# Patient Record
Sex: FEMALE | Race: WHITE | NOT HISPANIC OR LATINO | Employment: PART TIME | URBAN - METROPOLITAN AREA
[De-identification: names, ages, dates, MRNs, and addresses within clinical notes are randomized per-mention and may not be internally consistent; named-entity substitution may affect disease eponyms.]

---

## 2017-01-12 PROBLEM — E55.9 VITAMIN D DEFICIENCY: Status: ACTIVE | Noted: 2017-01-12

## 2017-03-29 ENCOUNTER — TRANSFERRED RECORDS (OUTPATIENT)
Dept: HEALTH INFORMATION MANAGEMENT | Facility: CLINIC | Age: 22
End: 2017-03-29

## 2017-04-04 ENCOUNTER — OFFICE VISIT (OUTPATIENT)
Dept: FAMILY MEDICINE | Facility: CLINIC | Age: 22
End: 2017-04-04
Payer: COMMERCIAL

## 2017-04-04 VITALS
BODY MASS INDEX: 28.53 KG/M2 | WEIGHT: 156 LBS | DIASTOLIC BLOOD PRESSURE: 74 MMHG | SYSTOLIC BLOOD PRESSURE: 105 MMHG | HEART RATE: 85 BPM | TEMPERATURE: 98.3 F

## 2017-04-04 DIAGNOSIS — Q21.3 TETRALOGY OF FALLOT: ICD-10-CM

## 2017-04-04 DIAGNOSIS — Q93.81 VELOCARDIOFACIAL SYNDROME: ICD-10-CM

## 2017-04-04 DIAGNOSIS — F25.1 SCHIZOAFFECTIVE DISORDER, DEPRESSIVE TYPE (H): Primary | ICD-10-CM

## 2017-04-04 DIAGNOSIS — Z72.89 DELIBERATE SELF-CUTTING: ICD-10-CM

## 2017-04-04 PROCEDURE — 99214 OFFICE O/P EST MOD 30 MIN: CPT | Performed by: FAMILY MEDICINE

## 2017-04-04 RX ORDER — RISPERIDONE 4 MG/1
4 TABLET ORAL 2 TIMES DAILY
COMMUNITY
End: 2017-04-04

## 2017-04-04 RX ORDER — RISPERIDONE 4 MG/1
4 TABLET ORAL AT BEDTIME
COMMUNITY
Start: 2017-04-04 | End: 2018-08-09

## 2017-04-04 RX ORDER — RISPERIDONE 1 MG/1
1 TABLET ORAL 2 TIMES DAILY
COMMUNITY
End: 2017-04-04

## 2017-04-04 RX ORDER — RISPERIDONE 0.5 MG/1
0.5 TABLET ORAL 2 TIMES DAILY
Qty: 60 TABLET | COMMUNITY
Start: 2017-04-04 | End: 2018-08-09

## 2017-04-04 RX ORDER — RISPERIDONE 0.5 MG/1
0.5 TABLET ORAL 2 TIMES DAILY
COMMUNITY
End: 2017-04-04

## 2017-04-04 RX ORDER — RISPERIDONE 1 MG/1
1 TABLET ORAL EVERY MORNING
Qty: 60 TABLET | COMMUNITY
Start: 2017-04-04 | End: 2017-07-20

## 2017-04-04 NOTE — PROGRESS NOTES
SUBJECTIVE:     CC: Camilla Henao is an 21 year old woman who presents for preventive health visit.     Healthy Habits:    Do you get at least three servings of calcium containing foods daily (dairy, green leafy vegetables, etc.)? yes    Amount of exercise or daily activities, outside of work: 3 day(s) per week    Problems taking medications regularly No    Medication side effects: No    Have you had an eye exam in the past two years? yes    Do you see a dentist twice per year? yes  Do you have sleep apnea, excessive snoring or daytime drowsiness?no        Today's PHQ-2 Score:   PHQ-2 ( 1999 [employer]) 6/4/2013 3/1/2013   Q1: Little interest or pleasure in doing things 1 0   Q2: Feeling down, depressed or hopeless 2 1   PHQ-2 Score 3 1       Abuse: Current or Past(Physical, Sexual or Emotional)-   Do you feel safe in your environment -     Social History   Substance Use Topics     Smoking status: Never Smoker     Smokeless tobacco: Never Used     Alcohol use No         Recent Labs   Lab Test  12/21/16   0833  06/17/16   1710   CHOL  158  162   HDL  49*  46*   LDL  81  97   TRIG  140  98   NHDL  109  116       Reviewed orders with patient.  Reviewed health maintenance and updated orders accordingly - No    Mammo Decision Support:  Mammogram not appropriate for this patient based on age.    Pertinent mammograms are reviewed under the imaging tab.  History of abnormal Pap smear: Pap not appropriate based on sexual history and mental health    Reviewed and updated as needed this visit by clinical staff         Reviewed and updated as needed this visit by Provider        Camilla Henao is a 21 year old female here with her mother. Camilla had a significant medical history of Tetralogy of Fallot, is s/p surgery for this, still sees a cardiologist annually.  She was also diagnosed with velocardiofacial syndrome, and had a palate repair and speech therapy in childhood.  She has had learning disabilities throughout school,  "and in adolescence also developed mental health issues, and has had various diagnoses including major depression, schizoaffective disorder, and possible schizophrenia.  She has had a history of auditory hallucination, also a history of self-cutting. She is followed by Dr. Mame Odell, psychiatrist.  It is felt that at least some of her neuropsychiatric issues are likely secondary to her velocardiofacial syndrome, as \"\"Children with VCFS are also at increased risk for mood and behavior disorders, which tend to emerge later in childhood and adolescence\".    She was seen 6/14/2016 for an admission physical prior to entering [facility], a 3 month residential treatment center.  She is now scheduled to return to [facility] for another three months, and needs an updated exam.  Mother has brought the appropriate forms which will be completed and scanned into EPIC; parent will bring originals to the facility.   Additional past history details are available in the note of 6/14/16.    ROS:  Camilla denies recent sinus trouble, GI or  concerns.  To her mother's knowledge, the patient has not cut herself for at least two weeks.    Current Outpatient Prescriptions   Medication Sig     MIRTAZAPINE PO Take 30 mg by mouth At Bedtime     Cholecalciferol (VITAMIN D-3 PO) Take 5,000 Units by mouth     risperiDONE (RISPERDAL) 4 MG tablet Take 1 tablet (4 mg) by mouth At Bedtime     risperiDONE (RISPERDAL) 1 MG tablet Take 1 tablet (1 mg) by mouth every morning     risperiDONE (RISPERDAL) 0.5 MG tablet Take 1 tablet (0.5 mg) by mouth 2 times daily Prn auditory hallucinations     No current facility-administered medications for this visit.        OBJECTIVE:     /74 (BP Location: Right arm, Patient Position: Chair, Cuff Size: Adult Regular)  Pulse 85  Temp 98.3  F (36.8  C) (Oral)  Wt 156 lb (70.8 kg)  BMI 28.53 kg/m2  EXAM:  She is alert and cooperative. She is slightly more communicative than on previous visits but still " "tends to look at the floor with poor eye contact, talks in a low hard to hear voice, very brief one or two work replies to questions.  PERRL, conjunctiva clear.  Ears are clear; TMs show no fluid or erythema.  Nose is unremarkable.  Throat is clear.  Neck is without adenopathy.  The lungs are clear without wheezes, rales, or rhonchi.  Hearts sounds are regular with a 2+ left upper sternal border systolic murmur.  The abdomen is benign.  There is no edema.  Skin is remarkable for multiple healed or healing scars from horizontal superficial cutting on arms and abdomen.        ASSESSMENT/PLAN:         ICD-10-CM    1. Schizoaffective disorder, depressive type (H) F25.1    2. Velocardiofacial syndrome Q93.81    3. Tetralogy of Fallot Q21.3    4. Deliberate self-cutting Z72.89        COUNSELING:            reports that she has never smoked. She has never used smokeless tobacco.    Estimated body mass index is 26.63 kg/(m^2) as calculated from the following:    Height as of 7/15/16: 5' 2\" (1.575 m).    Weight as of 7/15/16: 145 lb 9.6 oz (66 kg).       Counseling Resources:  ATP IV Guidelines  Pooled Cohorts Equation Calculator  Breast Cancer Risk Calculator  FRAX Risk Assessment  ICSI Preventive Guidelines  Dietary Guidelines for Americans, 2010  USDA's MyPlate  ASA Prophylaxis  Lung CA Screening    Tessy Lujan MD  [facility]  "

## 2017-04-04 NOTE — PATIENT INSTRUCTIONS
Preventive Health Recommendations  Female Ages 18 to 25     Yearly exam:     See your health care provider every year in order to  o Review health changes.   o Discuss preventive care.    o Review your medicines if your doctor has prescribed any.      You should be tested each year for STDs (sexually transmitted diseases).       After age 20, talk to your provider about how often you should have cholesterol testing.      Starting at age 21, get a Pap test every three years. If you have an abnormal result, your doctor may have you test more often.      If you are at risk for diabetes, you should have a diabetes test (fasting glucose).     Shots:     Get a flu shot each year.     Get a tetanus shot every 10 years.     Consider getting the shot (vaccine) that prevents cervical cancer (Gardasil).    Nutrition:     Eat at least 5 servings of fruits and vegetables each day.    Eat whole-grain bread, whole-wheat pasta and brown rice instead of white grains and rice.    Talk to your provider about Calcium and Vitamin D.     Lifestyle    Exercise at least 150 minutes a week each week (30 minutes a day, 5 days a week). This will help you control your weight and prevent disease.    Limit alcohol to one drink per day.    No smoking.     Wear sunscreen to prevent skin cancer.    See your dentist every six months for an exam and cleaning.    Health Maintenance   Topic Date Due     HPV IMMUNIZATION (1 of 3 - Female 3 Dose Series) 08/25/2005     PHQ-9 Q6 MONTHS (NO INBASKET)  12/04/2013     DEPRESSION ACTION PLAN Q1 YR (NO INBASKET)  06/04/2014     PAP SCREENING Q3 YR (SYSTEM ASSIGNED)  08/25/2015     INFLUENZA VACCINE (SYSTEM ASSIGNED)  09/01/2017     TETANUS IMMUNIZATION (SYSTEM ASSIGNED)  06/14/2026     Thank you for choosing [facility].  You may be receiving a survey in the mail from [employer] regarding your visit today.  Please take a few minutes to complete and return the survey to let us know how we are  doing.      Our Clinic hours are:  Mondays    7:20 am - 7 pm  Tues -  Fri  7:20 am - 5 pm    Clinic Phone: [phone]    The clinic lab opens at 7:30 am Mon - Fri and appointments are required.    [facility]  Ph. [phone]  Monday-Thursday 8 am - 7pm  Tues/Wed/Fri 8 am - 5:30 pm

## 2017-04-04 NOTE — MR AVS SNAPSHOT
After Visit Summary   4/4/2017    Camilla Henao    MRN: [id number]           Patient Information     Date Of Birth          1995        Visit Information        Provider Department      4/4/2017 9:00 AM Tessy Lujan MD [facility]        Today's Diagnoses     Cervical cancer screening    -  1      Care Instructions      Preventive Health Recommendations  Female Ages 18 to 25     Yearly exam:     See your health care provider every year in order to  o Review health changes.   o Discuss preventive care.    o Review your medicines if your doctor has prescribed any.      You should be tested each year for STDs (sexually transmitted diseases).       After age 20, talk to your provider about how often you should have cholesterol testing.      Starting at age 21, get a Pap test every three years. If you have an abnormal result, your doctor may have you test more often.      If you are at risk for diabetes, you should have a diabetes test (fasting glucose).     Shots:     Get a flu shot each year.     Get a tetanus shot every 10 years.     Consider getting the shot (vaccine) that prevents cervical cancer (Gardasil).    Nutrition:     Eat at least 5 servings of fruits and vegetables each day.    Eat whole-grain bread, whole-wheat pasta and brown rice instead of white grains and rice.    Talk to your provider about Calcium and Vitamin D.     Lifestyle    Exercise at least 150 minutes a week each week (30 minutes a day, 5 days a week). This will help you control your weight and prevent disease.    Limit alcohol to one drink per day.    No smoking.     Wear sunscreen to prevent skin cancer.    See your dentist every six months for an exam and cleaning.    Health Maintenance   Topic Date Due     HPV IMMUNIZATION (1 of 3 - Female 3 Dose Series) 08/25/2005     PHQ-9 Q6 MONTHS (NO INBASKET)  12/04/2013     DEPRESSION ACTION PLAN Q1 YR (NO INBASKET)  06/04/2014     PAP SCREENING Q3 YR  (SYSTEM ASSIGNED)  08/25/2015     INFLUENZA VACCINE (SYSTEM ASSIGNED)  09/01/2017     TETANUS IMMUNIZATION (SYSTEM ASSIGNED)  06/14/2026     Thank you for choosing [facility].  You may be receiving a survey in the mail from [employer] regarding your visit today.  Please take a few minutes to complete and return the survey to let us know how we are doing.      Our Clinic hours are:  Mondays    7:20 am - 7 pm  Tues - Fri  7:20 am - 5 pm    Clinic Phone: [phone]    The clinic lab opens at 7:30 am Mon - Fri and appointments are required.    [facility]  Ph. [phone]  Monday-Thursday 8 am - 7pm  Tues/Wed/Fri 8 am - 5:30 pm               Follow-ups after your visit        Who to contact     If you have questions or need follow up information about today's clinic visit or your schedule please contact [facility] directly at [phone].  Normal or non-critical lab and imaging results will be communicated to you by [employer], letter or phone within 4 business days after the clinic has received the results. If you do not hear from us within 7 days, please contact the clinic through [employer] or phone. If you have a critical or abnormal lab result, we will notify you by phone as soon as possible.  Submit refill requests through [employer] or call your pharmacy and they will forward the refill request to us. Please allow 3 business days for your refill to be completed.          Additional Information About Your Visit        [employer] Information     [employer] gives you secure access to your electronic health record. If you see a primary care provider, you can also send messages to your care team and make appointments. If you have questions, please call your primary care clinic.  If you do not have a primary care provider, please call [phone] and they will assist you.        Care EveryWhere ID     This is your Care EveryWhere ID. This could be used by other organizations to  access your [place] medical records  [id number]        Your Vitals Were     Pulse Temperature BMI (Body Mass Index)             85 98.3  F (36.8  C) (Oral) 28.53 kg/m2          Blood Pressure from Last 3 Encounters:   04/04/17 105/74   06/14/16 126/79   04/23/16 109/69    Weight from Last 3 Encounters:   04/04/17 156 lb (70.8 kg)   06/14/16 141 lb (64 kg)   06/04/13 129 lb 3.2 oz (58.6 kg) (56 %)*     * Growth percentiles are based on [facility] 2-20 Years data.              Today, you had the following     No orders found for display         Today's Medication Changes          These changes are accurate as of: 4/4/17 10:28 AM.  If you have any questions, ask your nurse or doctor.               These medicines have changed or have updated prescriptions.        Dose/Directions    * risperiDONE 4 MG tablet   Commonly known as:  risperDAL   This may have changed:  when to take this   Changed by:  Tessy Lujan MD        Dose:  4 mg   Take 1 tablet (4 mg) by mouth At Bedtime   Refills:  0       * risperiDONE 1 MG tablet   Commonly known as:  risperDAL   This may have changed:  when to take this   Changed by:  Tessy Lujan MD        Dose:  1 mg   Take 1 tablet (1 mg) by mouth every morning   Quantity:  60 tablet   Refills:  0       * risperDAL 0.5 MG tablet   This may have changed:  additional instructions   Generic drug:  risperiDONE   Changed by:  Tessy Lujan MD        Dose:  0.5 mg   Take 1 tablet (0.5 mg) by mouth 2 times daily Prn auditory hallucinations   Quantity:  60 tablet   Refills:  0       * Notice:  This list has 3 medication(s) that are the same as other medications prescribed for you. Read the directions carefully, and ask your doctor or other care provider to review them with you.             Primary Care Provider Office Phone # Fax #    Tessy Lujan [phone]       [facility] [address] [facility] [address]        Thank you!     Thank you for  choosing [facility]  for your care. Our goal is always to provide you with excellent care. Hearing back from our patients is one way we can continue to improve our services. Please take a few minutes to complete the written survey that you may receive in the mail after your visit with us. Thank you!             Your Updated Medication List - Protect others around you: Learn how to safely use, store and throw away your medicines at www.disposemymeds.org.          This list is accurate as of: 4/4/17 10:28 AM.  Always use your most recent med list.                   Brand Name Dispense Instructions for use    MIRTAZAPINE PO      Take 30 mg by mouth At Bedtime       * risperiDONE 4 MG tablet    risperDAL     Take 1 tablet (4 mg) by mouth At Bedtime       * risperiDONE 1 MG tablet    risperDAL    60 tablet    Take 1 tablet (1 mg) by mouth every morning       * risperDAL 0.5 MG tablet   Generic drug:  risperiDONE     60 tablet    Take 1 tablet (0.5 mg) by mouth 2 times daily Prn auditory hallucinations       VITAMIN D-3 PO      Take 5,000 Units by mouth       * Notice:  This list has 3 medication(s) that are the same as other medications prescribed for you. Read the directions carefully, and ask your doctor or other care provider to review them with you.

## 2017-04-04 NOTE — NURSING NOTE
"Chief Complaint   Patient presents with     Physical     Forms     [facility]        Initial /74 (BP Location: Right arm, Patient Position: Chair, Cuff Size: Adult Regular)  Pulse 85  Temp 98.3  F (36.8  C) (Oral)  Wt 156 lb (70.8 kg)  BMI 28.53 kg/m2 Estimated body mass index is 28.53 kg/(m^2) as calculated from the following:    Height as of 7/15/16: 5' 2\" (1.575 m).    Weight as of this encounter: 156 lb (70.8 kg).  Medication Reconciliation: complete   Ashley Hill CMA    "

## 2017-04-05 ENCOUNTER — ALLIED HEALTH/NURSE VISIT (OUTPATIENT)
Dept: FAMILY MEDICINE | Facility: CLINIC | Age: 22
End: 2017-04-05
Payer: COMMERCIAL

## 2017-04-05 DIAGNOSIS — Z11.1 SCREENING EXAMINATION FOR PULMONARY TUBERCULOSIS: Primary | ICD-10-CM

## 2017-04-05 PROCEDURE — 99207 ZZC NO CHARGE NURSE ONLY: CPT

## 2017-04-05 PROCEDURE — 86580 TB INTRADERMAL TEST: CPT

## 2017-04-05 ASSESSMENT — ANXIETY QUESTIONNAIRES
6. BECOMING EASILY ANNOYED OR IRRITABLE: MORE THAN HALF THE DAYS
7. FEELING AFRAID AS IF SOMETHING AWFUL MIGHT HAPPEN: MORE THAN HALF THE DAYS
1. FEELING NERVOUS, ANXIOUS, OR ON EDGE: MORE THAN HALF THE DAYS
3. WORRYING TOO MUCH ABOUT DIFFERENT THINGS: MORE THAN HALF THE DAYS
GAD7 TOTAL SCORE: 13
2. NOT BEING ABLE TO STOP OR CONTROL WORRYING: MORE THAN HALF THE DAYS
5. BEING SO RESTLESS THAT IT IS HARD TO SIT STILL: SEVERAL DAYS

## 2017-04-05 ASSESSMENT — PATIENT HEALTH QUESTIONNAIRE - PHQ9: 5. POOR APPETITE OR OVEREATING: MORE THAN HALF THE DAYS

## 2017-04-06 ASSESSMENT — ANXIETY QUESTIONNAIRES: GAD7 TOTAL SCORE: 13

## 2017-04-06 ASSESSMENT — PATIENT HEALTH QUESTIONNAIRE - PHQ9: SUM OF ALL RESPONSES TO PHQ QUESTIONS 1-9: 12

## 2017-04-07 ENCOUNTER — ALLIED HEALTH/NURSE VISIT (OUTPATIENT)
Dept: FAMILY MEDICINE | Facility: CLINIC | Age: 22
End: 2017-04-07
Payer: COMMERCIAL

## 2017-04-07 DIAGNOSIS — Z11.1 SCREENING EXAMINATION FOR PULMONARY TUBERCULOSIS: Primary | ICD-10-CM

## 2017-04-07 LAB
PPDINDURATION: 0 MM (ref 0–5)
PPDREDNESS: 0 MM

## 2017-04-07 PROCEDURE — 99207 ZZC NO CHARGE NURSE ONLY: CPT

## 2017-04-07 NOTE — MR AVS SNAPSHOT
After Visit Summary   4/7/2017    Camilla Henao    MRN: [id number]           Patient Information     Date Of Birth          1995        Visit Information        Provider Department      4/7/2017 4:15 PM ELENITA LAST/REBEKAH RN [facility]        Care Instructions    Mantoux placed 4/5/17 1:57  Mantoux read 4/7/17 4:18.       Mantoux result:Negative  Lab Results   Component Value Date    PPDREDNESS 0 04/07/2017    PPDINDURATIO 0 04/07/2017     Lina Malin RN          Follow-ups after your visit        Who to contact     If you have questions or need follow up information about today's clinic visit or your schedule please contact [facility] directly at [phone].  Normal or non-critical lab and imaging results will be communicated to you by [employer], letter or phone within 4 business days after the clinic has received the results. If you do not hear from us within 7 days, please contact the clinic through [employer] or phone. If you have a critical or abnormal lab result, we will notify you by phone as soon as possible.  Submit refill requests through [employer] or call your pharmacy and they will forward the refill request to us. Please allow 3 business days for your refill to be completed.          Additional Information About Your Visit        MyChart Information     [employer] gives you secure access to your electronic health record. If you see a primary care provider, you can also send messages to your care team and make appointments. If you have questions, please call your primary care clinic.  If you do not have a primary care provider, please call [phone] and they will assist you.        Care EveryWhere ID     This is your Care EveryWhere ID. This could be used by other organizations to access your [place] medical records  [id number]         Blood Pressure from Last 3 Encounters:   04/04/17 105/74   06/14/16 126/79   04/23/16 109/69    Weight from Last 3 Encounters:    04/04/17 156 lb (70.8 kg)   06/14/16 141 lb (64 kg)   06/04/13 129 lb 3.2 oz (58.6 kg) (56 %)*     * Growth percentiles are based on [facility] 2-20 Years data.              Today, you had the following     No orders found for display       Primary Care Provider Office Phone # Fax #    Tessy Latricia Lujan [phone]       [facility] [address] [facility] [address]        Thank you!     Thank you for choosing [facility]  for your care. Our goal is always to provide you with excellent care. Hearing back from our patients is one way we can continue to improve our services. Please take a few minutes to complete the written survey that you may receive in the mail after your visit with us. Thank you!             Your Updated Medication List - Protect others around you: Learn how to safely use, store and throw away your medicines at www.disposemymeds.org.          This list is accurate as of: 4/7/17  4:19 PM.  Always use your most recent med list.                   Brand Name Dispense Instructions for use    MIRTAZAPINE PO      Take 30 mg by mouth At Bedtime       * risperiDONE 4 MG tablet    risperDAL     Take 1 tablet (4 mg) by mouth At Bedtime       * risperiDONE 1 MG tablet    risperDAL    60 tablet    Take 1 tablet (1 mg) by mouth every morning       * risperDAL 0.5 MG tablet   Generic drug:  risperiDONE     60 tablet    Take 1 tablet (0.5 mg) by mouth 2 times daily Prn auditory hallucinations       VITAMIN D-3 PO      Take 5,000 Units by mouth       * Notice:  This list has 3 medication(s) that are the same as other medications prescribed for you. Read the directions carefully, and ask your doctor or other care provider to review them with you.

## 2017-04-07 NOTE — NURSING NOTE
Mantoux placed 4/5/17 1:57  Mantoux read 4/7/17 4:18.       Mantoux result:Negative  Lab Results   Component Value Date    PPDREDNESS 0 04/07/2017    PPDINDURATIO 0 04/07/2017     Lina Malin RN

## 2017-05-10 ENCOUNTER — TRANSFERRED RECORDS (OUTPATIENT)
Dept: HEALTH INFORMATION MANAGEMENT | Facility: CLINIC | Age: 22
End: 2017-05-10

## 2017-06-26 ENCOUNTER — OFFICE VISIT (OUTPATIENT)
Dept: FAMILY MEDICINE | Facility: CLINIC | Age: 22
End: 2017-06-26

## 2017-06-26 VITALS
WEIGHT: 164 LBS | TEMPERATURE: 97.8 F | HEART RATE: 118 BPM | HEIGHT: 63 IN | DIASTOLIC BLOOD PRESSURE: 88 MMHG | RESPIRATION RATE: 20 BRPM | OXYGEN SATURATION: 95 % | BODY MASS INDEX: 29.06 KG/M2 | SYSTOLIC BLOOD PRESSURE: 123 MMHG

## 2017-06-26 DIAGNOSIS — F64.0 GENDER DYSPHORIA IN ADOLESCENT AND ADULT: Primary | ICD-10-CM

## 2017-06-26 LAB
% GRANULOCYTES: 73.6 %G (ref 40–75)
GRANULOCYTES #: 5.3 K/UL (ref 1.6–8.3)
HCT VFR BLD AUTO: 41.8 % (ref 35–47)
HEMOGLOBIN: 13.6 G/DL (ref 11.7–15.7)
LYMPHOCYTES # BLD AUTO: 1.4 K/UL (ref 0.8–5.3)
LYMPHOCYTES NFR BLD AUTO: 19.2 %L (ref 20–48)
MCH RBC QN AUTO: 30.4 PG (ref 26.5–35)
MCHC RBC AUTO-ENTMCNC: 32.5 G/DL (ref 32–36)
MCV RBC AUTO: 93.6 FL (ref 78–100)
MID #: 0.5 K/UL (ref 0–2.2)
MID %: 7.2 %M (ref 0–20)
PLATELET # BLD AUTO: 141 K/UL (ref 150–450)
RBC # BLD AUTO: 4.47 M/UL (ref 3.8–5.2)
WBC # BLD AUTO: 7.2 K/UL (ref 4–11)

## 2017-06-26 NOTE — MR AVS SNAPSHOT
After Visit Summary   6/26/2017    Camilla Henao    MRN: [id number]           Patient Information     Date Of Birth          1995        Visit Information        Provider Department      6/26/2017 9:40 AM Gen Moreno,  [facility]        Today's Diagnoses     Gender dysphoria in adolescent and adult    -  1      Care Instructions    Here is the plan from today's visit    1. Gender dysphoria in adolescent and adult  Lab work today.    I will plan to talk with Zoltan Cano.  Read over the informed consents.    Will discuss more questions and go over risks/benefits next visit.    Can mychart or call with any questions or concerns.    - Testosterone Total  - CBC with Diff Plt        Follow up plan with Dr. Moreno in 3-4 weeks (whatever works best with your schedule)    Thank you for coming to [facility] today.  Lab Testing:  **If you had lab testing today and your results are reassuring or normal they will be mailed to you or sent through [employer] within 7 days.   **If the lab tests need quick action we will call you with the results.  The phone number we will call with results is # [phone] (home) . If this is not the best number please call our clinic and change the number.  Medication Refills:  If you need any refills please call your pharmacy and they will contact us.   If you need to  your refill at a new pharmacy, please contact the new pharmacy directly. The new pharmacy will help you get your medications transferred faster.   Scheduling:  If you have any concerns about today's visit or wish to schedule another appointment please call our office during normal business hours [phone] (8-5:00 M-F)  If a referral was made to a [facility] and you don't get a call from central scheduling please call [phone].  If a Mammogram was ordered for you at [facility] call [phone] to schedule or change your  appointment.  If you had an XRay/CT/Ultrasound/MRI ordered the number is [phone] to schedule or change your radiology appointment.   Medical Concerns:  If you have urgent medical concerns please call [phone] at any time of the day.  If you have a medical emergency please call 351.            Some online resources for transgender health    [facility] Center of Excellence for Transgender Health  Http://transhealth.[facility]/      [place] Transgender Health Coalition   Home to the [facility] at [address] [place] [address], [phone]. Also has support groups.  http://www.[place]transhealth.org/    Center of Excellence for Transgender Health  Increasing access to comprehensive, effective, and affirming healthcare services for trans and gender-variant communities.  http://www.transhealth.[facility]/trans?page=savannah-00-05    [facility]   Community-based non-profit committed to advancing the health & wellness of LGBTQ communities through research, education and advocacy. http://www.[employer].com.org    [facility] Glossary of Transgender Terms  http://www.Lastline.org/site/DocServer/Handout_7-C_Glossary_of_Gender_and_Transgender_Terms__fi.pdf?lqpTV=4455    Safe, gender-neutral public restrooms in the [facility]   http://www.[facility].org//index.php?option=com_content&task=view&id=12&Itemid    Trans Youth Support Network  For people 26 and under who identify as a trans or gender non-conforming person and want to be a part of an activist organization. Offers peer support, education and community building opportunities.   http://www.transyouthsupportnetwork.org/    Reclaim:  RECLAIM offers mental and integrative health services for LGBTQ youth and their families.  http://www.reclaim-lgbtyouth.org/    Gender Spectrum, for Trans Youth  Gender Spectrum provides education, training and support to help create a gender sensitive and inclusive environment for all children and teens.  http://www.genderspectrum.org/    Franky s FTM Resource Guide  Information on topics of interest to female-to-male (FTM, F2M) trans men, and their friends and loved ones.  http://ftmguide.org/    Also check out your local [employer] resource center!  LGBTQIA Services at [facility]: http://www.[facility]/lgbtqia/  LGBTQ@[facility]: http://www.[facility]/multiculturallife/lgbtq/  GLBTA Programs office at [facility]: https://diversity.[facility]/glbta/            Thoughts of self harm?   Trans Lifeline can be reached at [phone].   This service is staffed by trans people 24/7.   For LGBT youth (ages 24 and younger) contemplating suicide, the [employer] Lifeline can be reached at 2-022-6552.         Effects and Expected Time Course of Masculinizing  Hormones    Effect Expected Onset Expected Maximum Effect   Skin oiliness/Acne 1-6 months 1-2 years   Facial/body hair growth 3-6 months 3-5 years    Scalp hair loss >12 months+ Variable   Increased muscle mass/strength 6-12 months 2-5 years   Body fat redistribution 3-6 months 2-5 years   Cessation of menses 2-6 months n/a   Clitoral enlargement 3-6 months 1-2 years   Vaginal atrophy 3-6 months 1-2 years   Deepened voice 3-12 months 1-2 years                   Follow-ups after your visit        Who to contact     Please call your clinic at [phone] to:    Ask questions about your health    Make or cancel appointments    Discuss your medicines    Learn about your test results    Speak to your doctor   If you have compliments or concerns about an experience at your clinic, or if you wish to file a complaint, please contact [facility] Patient Relations at [phone] or email us at [email or web address]         Additional Information About Your Visit        MyChart Information     [facility] gives you secure access to your electronic health record. If you see a primary care provider, you can also send messages to your care  "team and make appointments. If you have questions, please call your primary care clinic.  If you do not have a primary care provider, please call [phone] and they will assist you.      HomeJab is an electronic gateway that provides easy, online access to your medical records. With HomeJab, you can request a clinic appointment, read your test results, renew a prescription or communicate with your care team.     To access your existing account, please contact your [facility] or call [phone] for assistance.        Care EveryWhere ID     This is your Care EveryWhere ID. This could be used by other organizations to access your [place] medical records  [id number]        Your Vitals Were     Pulse Temperature Respirations Height Pulse Oximetry BMI (Body Mass Index)    118 97.8  F (36.6  C) (Oral) 20 5' 2.75\" (159.4 cm) 95% 29.28 kg/m2       Blood Pressure from Last 3 Encounters:   06/26/17 123/88   04/04/17 105/74   06/14/16 126/79    Weight from Last 3 Encounters:   06/26/17 164 lb (74.4 kg)   04/04/17 156 lb (70.8 kg)   06/14/16 141 lb (64 kg)              We Performed the Following     CBC with Diff Plt     Testosterone Total        Primary Care Provider Office Phone # Fax #    Tessy Latricia Lujan [phone]       [place] [address]        Equal Access to Services     [facility]: Hadii aad ku nayanao Soange, waaxda luqadaha, qaybta kaalmada adeegyada, ashanti noel. So [facility] [phone].    ATENCIÓN: Si habla español, tiene a preciado disposición servicios gratuitos de asistencia lingüística. Llame al [phone].    We comply with applicable federal civil rights laws and [place] laws. We do not discriminate on the basis of race, color, national origin, age, disability sex, sexual orientation or gender identity.            Thank you!     Thank you for choosing [facility]  for your care. Our goal is always to provide you with excellent care. Hearing back from our patients is one way we can continue to improve our services. Please take a few minutes to complete the written survey that you may receive in the mail after your visit with us. Thank you!             Your Updated Medication List - Protect others around you: Learn how to safely use, store and throw away your medicines at www.disposemymeds.org.          This list is accurate as of: 6/26/17 10:37 AM.  Always use your most recent med list.                   Brand Name Dispense Instructions for use Diagnosis    FLUOXETINE HCL PO      Take 20 mg by mouth daily        MIRTAZAPINE PO      Take 30 mg by mouth At Bedtime        * risperiDONE 4 MG tablet    risperDAL     Take 1 tablet (4 mg) by mouth At Bedtime        * risperiDONE 1 MG tablet    risperDAL    60 tablet    Take 1 tablet (1 mg) by mouth every morning        * risperDAL 0.5 MG tablet   Generic drug:  risperiDONE     60 tablet    Take 1 tablet (0.5 mg) by mouth 2 times daily Prn auditory hallucinations        VITAMIN D-3 PO      Take 5,000 Units by mouth        * Notice:  This list has 3 medication(s) that are the same as other medications prescribed for you. Read the directions carefully, and ask your doctor or other care provider to review them with you.

## 2017-06-26 NOTE — PROGRESS NOTES
Transgender History Intake:       JANELL Cleaning is a 21 year old individual who presents today for interest in masculinizing hormone therapy to better align their body with their gender identity.  Came to this clinic via referral from: therapist Zoltan Cano    1-How do you identify? BOLD all that apply     Male   Female Transgender male  FTM  MTF  Intersex    Genderqueer Gender non-conforming Bigender Don't know Other:     2. What pronouns do you prefer?  He/Him/His/Himself   3-What age did you first feel your gender identity (internal sense of gender) did not match your physical body?   When was in 9-10th grade.  Didn't like having female body.  Everything female bodied.      Mom didn't know anything about it until senior year of high school.  Violent outburts, self harming.  Didn't find out he was trans until 4 years ago.  Didn't really get better.  Had a hard time accepting it.  Didn't like it and didn't want to be that way.  Mental health treatment wasn't receptive to it either.  Was told he had schizophrenia and that his thoughts about gender would go away.      He's come out to people (i.e. Extended family) in the last 3 months.  Mom has noticed more sense of peace since then.    4-Have you ever felt depressed or suicidal because your gender identity and body don't match?   No  5-Have you legally changed your name? no   If yes: prior name for ROYAL:   Gender marker on ID's?   no  6-Have you ever seen a health care provider about being transgender?No  When were you first treated and where? No hormones before  7-What hormones have you been on and for how long? (These can be treatments you were prescribed, that you got from a friend or bought without a prescription. Include any treatments you currently take.) N/A  8-Ever had any problems with hormone treatment?  N/A  9-If not on hormones, would you like to be?   Yes  Mom's concerns:  How to afford them?  Nothing covered under insurance.  Respect that he wants  to start hormones but also nervous about side effects, etc.  What are your goals for hormone therapy?   Deep voice, facial hair, more hair on body, no periods  10-Have you had any gender affirmation surgery? No  11-Do you want to have surgery now or in future?  YES top surgery  12-Are you out at work or at school or at home?      Everyone knows.  Came out 3 months ago to more people.  Has been out to close family for 2 years.  13-What are your other questions or concerns today?  None  14-What else we should know about you? None    ----------  How do you spend your days?  Work at [employer].  Like to watch TV (star wars).      No suicidal thoughts.  Not much anger.  Depression is good.  No violent or angry outbursts.  Seems more like old self.  Maybe not up there 100%.   No hallucinations.    Lives at home with dad and 3 sisters.  Sisters are supportive.      Referred by Zoltan Cano, therapist, through [facility].  Gender therapist and therapist for everything right now.  At [facility], they're not even having waiting list for hormones because too full-- were referred elsewhere.      PCP:  Dr. Lujan at [facility]  Cardiologist: Arnaldo Toledo at [facility] [place]   Psychiatrist: Chikis Rea [facility] in [place]    Past Surgical History:   Procedure Laterality Date     CARDIAC SURGERY      As a child. No restrictions     ENT SURGERY      palate repair when young     SURGICAL HISTORY OF -   01/1995    Repair of Tetralogy     Patient Active Problem List   Diagnosis     SYNDROME V-Z VELO-CARDIO-FACIAL SYNDROME     Tetralogy of Fallot     Other specific developmental learning difficulties     CARDIOVASCULAR SCREENING; LDL GOAL LESS THAN 130     Adjustment disorder with mixed disturbance of emotions and conduct     Learning disorder     MENTAL HEALTH     Major depressive disorder, single episode, moderate (H)     Parent-child relational problem     Sibling relational problem     Borderline intellectual  functioning     Abnormal finding on MRI of brain     Vitamin D deficiency   A little anxiety    Past Medical History:   Diagnosis Date     * * * SBE PROPHYLAXIS * * * 4/10/2006    Discontinued 2008 due to updated AHA recommendations     Symbolic dysfunction NEC     verbal apraxia     Tetralogy of Fallot      Current Outpatient Prescriptions   Medication Sig Dispense Refill     FLUOXETINE HCL PO Take 20 mg by mouth daily       MIRTAZAPINE PO Take 30 mg by mouth At Bedtime       Cholecalciferol (VITAMIN D-3 PO) Take 5,000 Units by mouth       risperiDONE (RISPERDAL) 4 MG tablet Take 1 tablet (4 mg) by mouth At Bedtime       risperiDONE (RISPERDAL) 1 MG tablet Take 1 tablet (1 mg) by mouth every morning 60 tablet      risperiDONE (RISPERDAL) 0.5 MG tablet Take 1 tablet (0.5 mg) by mouth 2 times daily Prn auditory hallucinations 60 tablet      Family History   Problem Relation Age of Onset     Depression Other 15     paternal cousin     Depression Other 15     paternal cousin     Depression Other 15     paternal cousin     No Known Allergies    History   Smoking Status     Never Smoker   Smokeless Tobacco     Never Used     Mental Health Assessment:  Non gender related Therapist? Zoltan Cano at [facility] is therapist for everything  Chemical use history:  None  Mental Health diagnosis  History:  Depression, ?schizophrenia (was told at some point he had it but mom is doubting the diagnosis)  Medications prescribed for above and by whom?  Psychiatrist-- Chikis Rea [facility] in [place]  ROYAL sent to:  Zoltan Cano.    Also will need to send ROYAL to Dr Chikis Rea at [facility] in [place].           Review of Systems:   CONSTITUTIONAL: NEGATIVE for fever, chills, change in weight  INTEGUMENTARY/SKIN: NEGATIVE for worrisome rashes, moles or lesions  EYES: NEGATIVE for vision changes or irritation  ENT/MOUTH: NEGATIVE for ear, mouth and throat problems  RESP: NEGATIVE for significant cough or SOB  BREAST: NEGATIVE for  "masses, tenderness or discharge  CV: NEGATIVE for chest pain, palpitations or peripheral edema  GI: NEGATIVE for nausea, abdominal pain, heartburn, or change in bowel habits  : NEGATIVE for frequency, dysuria, or hematuria  MUSCULOSKELETAL: NEGATIVE for significant arthralgias or myalgia  NEURO: NEGATIVE for weakness, dizziness or paresthesias  ENDOCRINE: NEGATIVE for temperature intolerance, skin/hair changes  HEME/ALLERGY: NEGATIVE for bleeding problems  PSYCHIATRIC: NEGATIVE for changes in mood or affect         Social History     Social History     Social History     Marital status: Single     Spouse name: N/A     Number of children: N/A     Years of education: N/A     Social History Main Topics     Smoking status: Never Smoker     Smokeless tobacco: Never Used     Alcohol use No     Drug use: No     Sexual activity: No     Other Topics Concern     Parent/Sibling W/ Cabg, Mi Or Angioplasty Before 65f 55m? No     Social History Narrative     Marital Status: Single  Who lives in your household? Parents, 3 sisters    Has anyone hurt you physically, for example by pushing, hitting, slapping or kicking you or forcing you to have sex? Denies  Do you feel threatened or controlled by a partner, ex-partner or anyone in your life? Denies    Sexual Health     Sexual concerns:  No   History of sexual abuse:  No  STI History:   Neg  Pregnancy History:  No obstetric history on file.  Last Pap Smear :  No results found for: PAP  Abnormal Pap Hx:  None  Not sexually active    Sexual health and relationships:  Current sexual partners: None     Past sexual partners:    None         Physical Exam:     Vitals:    06/26/17 0937   BP: 123/88   Pulse: 118   Resp: 20   Temp: 97.8  F (36.6  C)   TempSrc: Oral   SpO2: 95%   Weight: 164 lb (74.4 kg)   Height: 5' 2.75\" (159.4 cm)     BMI= Body mass index is 29.28 kg/(m^2).   Appearance: male dress  GENERAL: healthy, alert and no distress.  Tends to look at the floor with poor eye " contact  EYES: conjunctiva normal, PEERLA  HENT: ear canals normal, TM's normal, Nose normal, Mouth- no ulcers, no lesions  NECK: no adenopathy, no asymmetry, no masses, and thyroid normal to palpation, supple  RESP: lungs clear to auscultation - no rales, no rhonchi, no wheezes  CV: regular rate, 2+ systolic murmur  ABDOMEN: soft, no tenderness, no  hepatosplenomegaly, no masses, normal bowel sounds  MS: extremities normal- no gross deformities noted, no edema  SKIN: multiple healed scars horizontal superficial cutting on arms  NEURO: Normal strength and tone, sensory exam grossly normal, mentation intact and speech normal, reflexes symmetric  Affect: Appropriate/mood-congruent    Assessment and Plan   Black is a 20 y/o transgender male with history of tetralogy of fallot s/p surgery, 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    Gender dysphoria in adolescent and adult  Patient meets criteria for gender dysphoria.  Patient had lipid panel, glucose, and hepatic panel 6 months ago which were normal.  Will get testosterone and CBC today.  Gave overview of process this visit and gave Black and Mom informed consent to read over in detail and discuss next visit.  Need to make sure mental health is stable.  Patient denies suicidal thoughts or recent cutting.  ROYAL signed for psychologist Zoltan Cano.  Plan to discuss with psychologist and get letter of support for hormone therapy.  Will mail ROYAL to home to be filled out for psychiatrist Dr. Chikis Rea at [facility] in [place].  Mental health history and diagnoses unclear (?schizophrenia).  Want to touch base with Dr. Rea after getting ROYAL to make sure she is also in support of plan to start masculinizing hormones.  Patient is ok with IM testosterone.    -     Testosterone Total  -     CBC with Diff Plt       Today s visit included assessment of interventions  to alleviate symptoms related to gender dysphoria or gender nonconformity, including     psychological support    medical treatment (hormones or blockers)    options for social support or changes in gender expression.   Hormones can be provided in 3-6 months IF:     Patient able to provide informed consent     Likely to take hormones in a responsible manner    Discussed physical effects, benefits, and risk assessment & modification    Discussed the clinical and biochemical monitoring of hormonal changes and the potential impact on reproductive health (see smiavsconsent)    Stable mental health. Transgender patients are at higher risk of suicide. This patient has been assessed for suicide risk.  Oriented to overall gender assessment and hormone start process, may be 3-6 months before hormones start, need for n0hwlyd visits then q3mo, then q6mo    Follow up:  Follow up in 3-4 weeks.    Gen Moreno DO

## 2017-06-26 NOTE — PATIENT INSTRUCTIONS
Here is the plan from today's visit    1. Gender dysphoria in adolescent and adult  Lab work today.    I will plan to talk with Zoltan Cano.  Read over the informed consents.    Will discuss more questions and go over risks/benefits next visit.    Can mychart or call with any questions or concerns.    - Testosterone Total  - CBC with Diff Plt        Follow up plan with Dr. Moreno in 3-4 weeks (whatever works best with your schedule)    Thank you for coming to [facility] today.  Lab Testing:  **If you had lab testing today and your results are reassuring or normal they will be mailed to you or sent through [employer] within 7 days.   **If the lab tests need quick action we will call you with the results.  The phone number we will call with results is # [phone] (home) . If this is not the best number please call our clinic and change the number.  Medication Refills:  If you need any refills please call your pharmacy and they will contact us.   If you need to  your refill at a new pharmacy, please contact the new pharmacy directly. The new pharmacy will help you get your medications transferred faster.   Scheduling:  If you have any concerns about today's visit or wish to schedule another appointment please call our office during normal business hours [phone] (8-5:00 M-F)  If a referral was made to a [facility] and you don't get a call from central scheduling please call [phone].  If a Mammogram was ordered for you at [facility] call [phone] to schedule or change your appointment.  If you had an XRay/CT/Ultrasound/MRI ordered the number is [phone] to schedule or change your radiology appointment.   Medical Concerns:  If you have urgent medical concerns please call [phone] at any time of the day.  If you have a medical emergency please call 191.            Some online resources for transgender health    [facility] Center of Excellence for Transgender  Health  Http://transhealth.[facility]/      [place] Transgender Health Coalition   Home to the [facility] at [address] [facility] [facility], [phone]. Also has support groups.  http://www.[place]transhealth.org/    Center of Excellence for Transgender Health  Increasing access to comprehensive, effective, and affirming healthcare services for trans and gender-variant communities.  http://www.transhealth.[facility]/trans?page=savannah-00-05    [facility]   Community-based non-profit committed to advancing the health & wellness of LGBTQ communities through research, education and advocacy. http://www.[employer].org    [employer] Glossary of Transgender Terms  http://www.[employer].org/site/DocServer/Handout_7-C_Glossary_of_Gender_and_Transgender_Terms__fi.pdf?pjjBR=8139    Safe, gender-neutral public restrooms in [facility]   http://www.[facility].org//index.php?option=com_content&task=view&id=12&Itemid    Trans Youth Support Network  For people 26 and under who identify as a trans or gender non-conforming person and want to be a part of an activist organization. Offers peer support, education and community building opportunities.   http://www.transyouthsupportnetwork.org/    Reclaim:  RECLAIM offers mental and integrative health services for LGBTQ youth and their families.  http://www.reclaim-lgbtyouth.org/    Gender Spectrum, for Trans Youth  Gender Spectrum provides education, training and support to help create a gender sensitive and inclusive environment for all children and teens. http://www.genderspectrum.org/    Franky s FTM Resource Guide  Information on topics of interest to female-to-male (FTM, F2M) trans men, and their friends and loved ones.  http://ftmguide.org/    Also check out your local [employer] resource center!  LGBTQIA Services at [facility]: http://www.[facility]/lgbtqia/  LGBTQ@[facility] at [facility]: http://www.[facility]/multiculturallife/lgbtq/  GLBTA Programs office at   [place]: https://diversity.[facility]/glcindi/            Thoughts of self harm?   Trans Lifeline can be reached at [phone].   This service is staffed by trans people 24/7.   For LGBT youth (ages 24 and younger) contemplating suicide, the [employer] Lifeline can be reached at [phone].         Effects and Expected Time Course of Masculinizing  Hormones    Effect Expected Onset Expected Maximum Effect   Skin oiliness/Acne 1-6 months 1-2 years   Facial/body hair growth 3-6 months 3-5 years    Scalp hair loss >12 months+ Variable   Increased muscle mass/strength 6-12 months 2-5 years   Body fat redistribution 3-6 months 2-5 years   Cessation of menses 2-6 months n/a   Clitoral enlargement 3-6 months 1-2 years   Vaginal atrophy 3-6 months 1-2 years   Deepened voice 3-12 months 1-2 years

## 2017-06-26 NOTE — PROGRESS NOTES
Preceptor Attestation:   Patient seen and discussed with the resident. Assessment and plan reviewed with resident and agreed upon.   Supervising Physician:  Steve Galvan MD  [facility]

## 2017-06-27 LAB — TESTOST SERPL-MCNC: 33 NG/DL (ref 8–60)

## 2017-07-12 ENCOUNTER — TELEPHONE (OUTPATIENT)
Dept: FAMILY MEDICINE | Facility: CLINIC | Age: 22
End: 2017-07-12

## 2017-07-12 NOTE — TELEPHONE ENCOUNTER
[facility] phone call message- patient requesting to speak with PCP or provider:    PCP: Gen Moreno    Additional Comments: Zoltan patient's mental health provider is requesting call from pcp regarding patient's mental health.    Is a call back needed? Yes    Patient informed that it may take up to 2 business days to hear back from PCP:Yes    OK to leave a message on voice mail? Yes    Primary language: English      needed? No    Call taken on July 12, 2017 at 11:45 AM by Jody Lopez

## 2017-07-15 ENCOUNTER — HEALTH MAINTENANCE LETTER (OUTPATIENT)
Age: 22
End: 2017-07-15

## 2017-07-20 ENCOUNTER — OFFICE VISIT (OUTPATIENT)
Dept: FAMILY MEDICINE | Facility: CLINIC | Age: 22
End: 2017-07-20

## 2017-07-20 VITALS
TEMPERATURE: 97.7 F | HEART RATE: 105 BPM | SYSTOLIC BLOOD PRESSURE: 132 MMHG | WEIGHT: 163 LBS | BODY MASS INDEX: 29.1 KG/M2 | OXYGEN SATURATION: 96 % | RESPIRATION RATE: 20 BRPM | DIASTOLIC BLOOD PRESSURE: 82 MMHG

## 2017-07-20 DIAGNOSIS — F64.0 GENDER DYSPHORIA IN ADOLESCENT AND ADULT: ICD-10-CM

## 2017-07-20 DIAGNOSIS — Z00.00 ROUTINE HEALTH MAINTENANCE: ICD-10-CM

## 2017-07-20 DIAGNOSIS — E55.9 VITAMIN D DEFICIENCY: Primary | ICD-10-CM

## 2017-07-20 NOTE — MR AVS SNAPSHOT
After Visit Summary   7/20/2017    Camilla Henao    MRN: [id number]           Patient Information     Date Of Birth          1995        Visit Information        Provider Department      7/20/2017 1:00 PM Gen Moreno DO [facility]        Today's Diagnoses     Vitamin D deficiency    -  1      Care Instructions    Here is the plan from today's visit    1.  HRT  - Start testosterone next visit (try to make nursing visit after)  - will send testosterone to [facility] pharmacy so it has time to go through PA process  - Needles: 18g to draw up, 25g needle to inject, 25mg (0.13mL) testosterone weekly  - Labs: 3 months after starting, 6 months after starting, 1 year after starting, then yearly    2.  Vitamin D deficiency  - Vitamin D Level    3.  HPV vaccine  1st dose today  1-2 months get second does  3-6 months get third dose     Follow up plan: 2-3 weeks      Thank you for coming to [facility] today.  Lab Testing:  **If you had lab testing today and your results are reassuring or normal they will be mailed to you or sent through [employer] within 7 days.   **If the lab tests need quick action we will call you with the results.  The phone number we will call with results is # [phone] (home) . If this is not the best number please call our clinic and change the number.  Medication Refills:  If you need any refills please call your pharmacy and they will contact us.   If you need to  your refill at a new pharmacy, please contact the new pharmacy directly. The new pharmacy will help you get your medications transferred faster.   Scheduling:  If you have any concerns about today's visit or wish to schedule another appointment please call our office during normal business hours [phone] (8-5:00 M-F)  If a referral was made to a [facility] and you don't get a call from central scheduling please call [phone].  If a Mammogram was  ordered for you at [facility] call [phone] to schedule or change your appointment.  If you had an XRay/CT/Ultrasound/MRI ordered the number is [phone] to schedule or change your radiology appointment.   Medical Concerns:  If you have urgent medical concerns please call [phone] at any time of the day.  If you have a medical emergency please call 911.        Some online resources for transgender health    [place] Transgender Health Coalition   Home to the [facility] at [address] [place] [address], [phone]. Also has support groups.  http://www.[place]transhealth.org/    Center of Excellence for Transgender Health  Increasing access to comprehensive, effective, and affirming healthcare services for trans and gender-variant communities.  http://www.transhealth.[facility].edu/trans?page=savannah-00-05    [facility]   Community-based non-profit committed to advancing the health & wellness of LGBTQ communities through research, education and advocacy. http://www.[employer].org    [facility] Glossary of Transgender Terms  http://www.[employer].org/site/DocServer/Handout_7-C_Glossary_of_Gender_and_Transgender_Terms__fi.pdf?dyoDQ=2473    Safe, gender-neutral public restrooms in the [facility]   http://www.[facility].org//index.php?option=com_content&task=view&id=12&Itemid    Trans Youth Support Network  For people 26 and under who identify as a trans or gender non-conforming person and want to be a part of an activist organization. Offers peer support, education and community building opportunities.   http://www.transyouthsupportnetwork.org/    Reclaim:  RECLAIM offers mental and integrative health services for LGBTQ youth and their families.  http://www.reclaim-lgbtyouth.org/    Gender Spectrum, for Trans Youth  Gender Spectrum provides education, training and support to help create a gender sensitive and inclusive environment for all children and teens.  http://www.genderspectrum.org/    Franky s FTM Resource Guide  Information on topics of interest to female-to-male (FTM, F2M) trans men, and their friends and loved ones.  http://ftmguide.org/    Also check out your local [employer] resource center!  LGBTQIA Services at [facility]: http://www.[facility]/lgbtqia/  LGBTQ@[facility] at [facility]: http://www.[facility]/multiculturallife/lgbtq/  GLBTA Programs office at [facility]: https://diversity.[facility]/glbta/            Thoughts of self harm?   Trans Lifeline can be reached at [phone].   This service is staffed by trans people 24/7.   For LGBT youth (ages 24 and younger) contemplating suicide, the [employer] Lifeline can be reached at [phone].       Effects and Expected Time Course of Masculinizing  Hormones    Effect Expected Onset Expected Maximum Effect   Skin oiliness/Acne 1-6 months 1-2 years   Facial/body hair growth 3-6 months 3-5 years    Scalp hair loss >12 months+ Variable   Increased muscle mass/strength 6-12 months 2-5 years   Body fat redistribution 3-6 months 2-5 years   Cessation of menses 2-6 months n/a   Clitoral enlargement 3-6 months 1-2 years   Vaginal atrophy 3-6 months 1-2 years   Deepened voice 3-12 months 1-2 years                   Follow-ups after your visit        Who to contact     Please call your clinic at [phone] to:    Ask questions about your health    Make or cancel appointments    Discuss your medicines    Learn about your test results    Speak to your doctor   If you have compliments or concerns about an experience at your clinic, or if you wish to file a complaint, please contact [facility] Patient Relations at [phone] or email us at [email or web address]         Additional Information About Your Visit        MyChart Information     MyDochart gives you secure access to your electronic health record. If you see a primary care provider, you can also send messages to your care  team and make appointments. If you have questions, please call your primary care clinic.  If you do not have a primary care provider, please call [phone] and they will assist you.      IRIS.TV is an electronic gateway that provides easy, online access to your medical records. With IRIS.TV, you can request a clinic appointment, read your test results, renew a prescription or communicate with your care team.     To access your existing account, please contact your [facility] or call [phone] for assistance.        Care EveryWhere ID     This is your Care EveryWhere ID. This could be used by other organizations to access your [facility] medical records  [id number]        Your Vitals Were     Pulse Temperature Respirations Last Period Pulse Oximetry Breastfeeding?    105 97.7  F (36.5  C) (Oral) 20 07/10/2017 (Approximate) 96% No    BMI (Body Mass Index)                   29.1 kg/m2            Blood Pressure from Last 3 Encounters:   07/20/17 132/82   06/26/17 123/88   04/04/17 105/74    Weight from Last 3 Encounters:   07/20/17 163 lb (73.9 kg)   06/26/17 164 lb (74.4 kg)   04/04/17 156 lb (70.8 kg)              We Performed the Following     Vitamin D Level          Today's Medication Changes          These changes are accurate as of: 7/20/17  2:27 PM.  If you have any questions, ask your nurse or doctor.               These medicines have changed or have updated prescriptions.        Dose/Directions    * risperiDONE 4 MG tablet   Commonly known as:  risperDAL   This may have changed:  Another medication with the same name was removed. Continue taking this medication, and follow the directions you see here.   Changed by:  Tessy Lujan MD        Dose:  4 mg   Take 1 tablet (4 mg) by mouth At Bedtime   Refills:  0       * risperDAL 0.5 MG tablet   This may have changed:  Another medication with the same name was removed. Continue taking this medication, and follow the  directions you see here.   Generic drug:  risperiDONE   Changed by:  Tessy Lujan MD        Dose:  0.5 mg   Take 1 tablet (0.5 mg) by mouth 2 times daily Prn auditory hallucinations   Quantity:  60 tablet   Refills:  0       * Notice:  This list has 2 medication(s) that are the same as other medications prescribed for you. Read the directions carefully, and ask your doctor or other care provider to review them with you.             Primary Care Provider Office Phone # Fax #    Gen Moreno,  [phone]       [facility] [address] [facility] [address]        Equal Access to Services     [facility] CenterMARIBELL : Hadii tanya ku hadasho Soomaali, waaxda luqadaha, qaybta kaalmada adeegyada, ashanti betancourt . So [facility] [place] [phone].    ATENCIÓN: Si habla español, tiene a preciado disposición servicios gratuitos de asistencia lingüística. Llame al [phone].    We comply with applicable federal civil rights laws and [place] laws. We do not discriminate on the basis of race, color, national origin, age, disability sex, sexual orientation or gender identity.            Thank you!     Thank you for choosing [facility]  for your care. Our goal is always to provide you with excellent care. Hearing back from our patients is one way we can continue to improve our services. Please take a few minutes to complete the written survey that you may receive in the mail after your visit with us. Thank you!             Your Updated Medication List - Protect others around you: Learn how to safely use, store and throw away your medicines at www.disposemymeds.org.          This list is accurate as of: 7/20/17  2:27 PM.  Always use your most recent med list.                   Brand Name Dispense Instructions for use Diagnosis    FLUOXETINE HCL PO      Take 20 mg by mouth daily        MIRTAZAPINE PO      Take 30 mg by mouth At Bedtime        * risperiDONE 4 MG  tablet    risperDAL     Take 1 tablet (4 mg) by mouth At Bedtime        * risperDAL 0.5 MG tablet   Generic drug:  risperiDONE     60 tablet    Take 1 tablet (0.5 mg) by mouth 2 times daily Prn auditory hallucinations        VITAMIN D-3 PO      Take 5,000 Units by mouth        * Notice:  This list has 2 medication(s) that are the same as other medications prescribed for you. Read the directions carefully, and ask your doctor or other care provider to review them with you.

## 2017-07-20 NOTE — PROGRESS NOTES
HPI   Black is a 21 year old individual that uses pronouns He/Him/His/Himself that presents today for follow up of:  masculinizing hormone therapy. Gender identity: trans male    Any special concerns today?    - Cost of treatment: wondering how much the medication will cost if it is not covered by insurance  - Any cardiac side effects with testosterone?     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    We discussed obtaining an ROYAL from the patient's psychiatrist today. The patient and his family report that Black hasn't really discussed trans-related issues in much detail with his psychiatrist because the psychiatrist did not come across as very supportive of trans issues. Mom reports that the psychiatrist told Black that his gender dysphoria was all related to his uncontrolled schizoaffective disorder and should go away.     Regarding mood, Black and his mom both emphasize that his mood is much improved since coming out over the past several months and years. He has no ongoing passive suicidal ideation. He is continuing to see his therapist, Zoltan, at [facility] and they have a great relationship. The therapist, Zoltan Cano, reports that he has no concerns about initiating HRT.    We discussed the informed consent in much more detail today including side effects, mood changes, expected effects of therapy, teratogenicity, controlled substance. The patient and family received this all well and had no outstanding questions.     On hormones?  Not yet.   ---    Past Surgical History:   Procedure Laterality Date     CARDIAC SURGERY      As a child. No restrictions     ENT SURGERY      palate repair when young     SURGICAL HISTORY OF -   01/1995    Repair of Tetralogy     Patient Active  "Problem List   Diagnosis     SYNDROME V-Z VELO-CARDIO-FACIAL SYNDROME     Tetralogy of Fallot     Other specific developmental learning difficulties     CARDIOVASCULAR SCREENING; LDL GOAL LESS THAN 130     Adjustment disorder with mixed disturbance of emotions and conduct     Learning disorder     MENTAL HEALTH     Major depressive disorder, single episode, moderate (H)     Parent-child relational problem     Sibling relational problem     Borderline intellectual functioning     Abnormal finding on MRI of brain     Vitamin D deficiency     Current Outpatient Prescriptions   Medication Sig Dispense Refill     FLUOXETINE HCL PO Take 20 mg by mouth daily       MIRTAZAPINE PO Take 30 mg by mouth At Bedtime       Cholecalciferol (VITAMIN D-3 PO) Take 5,000 Units by mouth       risperiDONE (RISPERDAL) 4 MG tablet Take 1 tablet (4 mg) by mouth At Bedtime       risperiDONE (RISPERDAL) 0.5 MG tablet Take 1 tablet (0.5 mg) by mouth 2 times daily Prn auditory hallucinations 60 tablet      risperiDONE (RISPERDAL) 1 MG tablet Take 1 tablet (1 mg) by mouth every morning 60 tablet      History   Smoking Status     Never Smoker   Smokeless Tobacco     Never Used      No Known Allergies    Problem, Medication and Allergy Lists were reviewed and updated if needed.         Review of Systems:   Constitutional: no fevers, no chills  CV: no chest pains  Pulm: no shortness of breath  Psych:  + depression (controlled).  No passive or active SI.  H/o self harm (cutting) but none recently.  + anxiety (controlled).  On appropriate pharmacotherapy and seeing therapist regularly.  Mood is \"good\".            Physical Exam:   Vitals:    07/20/17 1304   BP: 132/82   BP Location: Left arm   Patient Position: Chair   Cuff Size: Adult Regular   Pulse: 105   Resp: 20   Temp: 97.7  F (36.5  C)   TempSrc: Oral   SpO2: 96%   Weight: 163 lb (73.9 kg)     BMI= Body mass index is 29.1 kg/(m^2).   Wt Readings from Last 10 Encounters:   07/20/17 163 lb (73.9 " kg)   06/26/17 164 lb (74.4 kg)   04/04/17 156 lb (70.8 kg)   06/14/16 141 lb (64 kg)   06/04/13 129 lb 3.2 oz (58.6 kg) (56 %)*   05/02/13 126 lb 9.6 oz (57.4 kg) (52 %)*   04/22/13 126 lb 6.4 oz (57.3 kg) (52 %)*   04/06/13 127 lb (57.6 kg) (53 %)*   04/06/13 125 lb (56.7 kg) (49 %)*   03/01/13 126 lb 12.8 oz (57.5 kg) (53 %)*     * Growth percentiles are based on [facility] 2-20 Years data.     Appearance: Male appearance and dress    GENERAL: healthy, alert and no distress  RESP: lungs clear to auscultation - no rales, no rhonchi, no wheezes  CV: Mild tachycardia to the low 100's, regular rhythm. normal S1 S2 with harsh 4/6 obstructive systolic murmur at 2nd ICS  MS: extremities normal- no gross deformities noted, no edema  SKIN: no suspicious lesions, no rashes. Substantial linear scars across forearms, thighs, and legs. None appear new, none appear complicated.  Affect: Appropriate/mood-congruent, Restricted and Blunted/Flat         Labs:     Vitamin D Deficiency screening 65  20 - 75 ug/L Final 07/20/2017  4:45 PM 51       Assessment and Plan     Black was seen today for HRT initiation.    Diagnoses and all orders for this visit:    Gender dysphoria in adolescent and adult  Regarding HRT, will plan to initiate HRT at next visit after contacting psychiatrist. Patient meets criteria for gender dysphoria. ROYAL signed today. Therapist with no concerns (had phone conversation with Zoltan Cano at [facility]). No contraindications for initiating HRT today; psych issues have been stable without any recent SI/HI/Self-harm recently. Very supportive family environment. Will go ahead and work through prior authorization in the meantime. Will start on lower dose testosterone and plan to increase slowly if tolerating well.  Will prescribe 26mg (0.13mL) subcutaneous testosterone cypionate weekly. Will send to pharmacy in preparation for next visit.  Black is interested in sub-Q administration and will be provided with training  with RN next visit.     Vitamin D deficiency  Vitamin D level normal, no need for supplementation.  -     Vitamin D Level    Routine health maintenance  Second dose of HPV vaccine in 1-2 months.  Third dose in 3-6 months.  -     HPV9 (Gardasil 9 )  -     ADMIN VACCINE, INITIAL      Contraception:   abstinence  .    Counselled patient about controlled substances: Yes. Details: controlled substance, no sharing, no overuse.    Follow up:  Follow up in 2-3 weeks.  Results by mychart  Questions were elicited and answered.     Jose QUIÑONEZ, M4, am serving as a scribe; to document services personally performed by Dr. Linda Avalos MD and resident Dr. Janelle Moreno DO based on data collection and providers statements to me. The above assessment and plan is the product of our joint decision making.    Gen Moreno DO

## 2017-07-20 NOTE — Clinical Note
Testosterone prescription pended.  Please print, have preceptor sign, and fax to pharmacy.  Janelle Moreno, DO Pager [phone]

## 2017-07-20 NOTE — PROGRESS NOTES
Preceptor Attestation:   Patient seen and discussed with the resident. Assessment and plan reviewed with resident and agreed upon.   Supervising Physician:  Harika Juares MD  [facility]

## 2017-07-20 NOTE — PATIENT INSTRUCTIONS
Here is the plan from today's visit    1.  HRT  - Start testosterone next visit (try to make nursing visit after)  - will send testosterone to [facility] so it has time to go through PA process  - Needles: 18g to draw up, 25g needle to inject, 25mg (0.13mL) testosterone weekly  - Labs: 3 months after starting, 6 months after starting, 1 year after starting, then yearly    2.  Vitamin D deficiency  - Vitamin D Level    3.  HPV vaccine  1st dose today  1-2 months get second does  3-6 months get third dose     Follow up plan: 2-3 weeks      Thank you for coming to [facility] today.  Lab Testing:  **If you had lab testing today and your results are reassuring or normal they will be mailed to you or sent through [employer] within 7 days.   **If the lab tests need quick action we will call you with the results.  The phone number we will call with results is # [phone] (home) . If this is not the best number please call our clinic and change the number.  Medication Refills:  If you need any refills please call your pharmacy and they will contact us.   If you need to  your refill at a new pharmacy, please contact the new pharmacy directly. The new pharmacy will help you get your medications transferred faster.   Scheduling:  If you have any concerns about today's visit or wish to schedule another appointment please call our office during normal business hours [phone] (8-5:00 M-F)  If a referral was made to a [facility] and you don't get a call from central scheduling please call [phone].  If a Mammogram was ordered for you at [facility] call [phone] to schedule or change your appointment.  If you had an XRay/CT/Ultrasound/MRI ordered the number is [phone] to schedule or change your radiology appointment.   Medical Concerns:  If you have urgent medical concerns please call [phone] at any time of the day.  If you have a medical emergency please call  911.        Some online resources for transgender health    [place] Transgender Health Coalition   Home to the [facility] at [address] [facility] [address] [facility], [phone]. Also has support groups.  http://www.[place]transhealth.org/    Center of Excellence for Transgender Health  Increasing access to comprehensive, effective, and affirming healthcare services for trans and gender-variant communities.  http://www.transhealth.[facility].edu/trans?page=savannah-00-05    [facility]   Community-based non-profit committed to advancing the health & wellness of LGBTQ communities through research, education and advocacy. http://www.[facility].org    [facility] Glossary of Transgender Terms  http://www.Kiwi.org/site/DocServer/Handout_7-C_Glossary_of_Gender_and_Transgender_Terms__fi.pdf?zsmMF=6475    Safe, gender-neutral public restrooms in the [facility]   http://www.[facility].org//index.php?option=com_content&task=view&id=12&Itemid    Trans Youth Support Network  For people 26 and under who identify as a trans or gender non-conforming person and want to be a part of an activist organization. Offers peer support, education and community building opportunities.   http://www.transyouthsupportnetwork.org/    Reclaim:  RECLAIM offers mental and integrative health services for LGBTQ youth and their families.  http://www.reclaim-lgbtyouth.org/    Gender Spectrum, for Trans Youth  Gender Spectrum provides education, training and support to help create a gender sensitive and inclusive environment for all children and teens. http://www.genderspectrum.org/    Franky s FTM Resource Guide  Information on topics of interest to female-to-male (FTM, F2M) trans men, and their friends and loved ones.  http://ftmguide.org/    Also check out your local salgomeder resource center!  LGBTQIA Services at [facility]: http://www.[facility]/lgbtqia/  LGBTQ@[facility] at [facility]: http://www.[facility]/multiculturallife/lgbtq/  GLBTA  Programs office at  of : https://diversity.[facility]/glbta/            Thoughts of self harm?   Trans Lifeline can be reached at [phone].   This service is staffed by trans people 24/7.   For LGBT youth (ages 24 and younger) contemplating suicide, the [employer] Lifeline can be reached at 5-509-7706.       Effects and Expected Time Course of Masculinizing  Hormones    Effect Expected Onset Expected Maximum Effect   Skin oiliness/Acne 1-6 months 1-2 years   Facial/body hair growth 3-6 months 3-5 years    Scalp hair loss >12 months+ Variable   Increased muscle mass/strength 6-12 months 2-5 years   Body fat redistribution 3-6 months 2-5 years   Cessation of menses 2-6 months n/a   Clitoral enlargement 3-6 months 1-2 years   Vaginal atrophy 3-6 months 1-2 years   Deepened voice 3-12 months 1-2 years

## 2017-07-21 LAB — DEPRECATED CALCIDIOL+CALCIFEROL SERPL-MC: 65 UG/L (ref 20–75)

## 2017-07-26 RX ORDER — TESTOSTERONE CYPIONATE 200 MG/ML
26 INJECTION, SOLUTION INTRAMUSCULAR WEEKLY
Qty: 2 ML | Refills: 0 | Status: SHIPPED | OUTPATIENT
Start: 2017-07-26 | End: 2017-08-03

## 2017-07-26 NOTE — NURSING NOTE
Testosterone prescription printed and singed by preceptor Dr. DEANGELO Casillas. Faxed to [facility] pharmacy.    Yolanda Roach RN

## 2017-08-02 ENCOUNTER — TELEPHONE (OUTPATIENT)
Dept: FAMILY MEDICINE | Facility: CLINIC | Age: 22
End: 2017-08-02

## 2017-08-02 NOTE — TELEPHONE ENCOUNTER
PA started 08/02/2017 testosterone cypionate (DEPOTESTOTERONE) 200 MG/ML injection      covermymdes key: [id number]    [facility] pharmacy fax: [phone]      Mae Park CMA

## 2017-08-03 ENCOUNTER — OFFICE VISIT (OUTPATIENT)
Dept: FAMILY MEDICINE | Facility: CLINIC | Age: 22
End: 2017-08-03

## 2017-08-03 VITALS
SYSTOLIC BLOOD PRESSURE: 108 MMHG | BODY MASS INDEX: 28.89 KG/M2 | RESPIRATION RATE: 18 BRPM | WEIGHT: 161.8 LBS | OXYGEN SATURATION: 98 % | TEMPERATURE: 97.6 F | HEART RATE: 67 BPM | DIASTOLIC BLOOD PRESSURE: 74 MMHG

## 2017-08-03 DIAGNOSIS — F64.0 GENDER DYSPHORIA IN ADOLESCENT AND ADULT: Primary | ICD-10-CM

## 2017-08-03 RX ORDER — TESTOSTERONE CYPIONATE 200 MG/ML
26 INJECTION, SOLUTION INTRAMUSCULAR WEEKLY
Qty: 2 ML | Refills: 5 | Status: SHIPPED | OUTPATIENT
Start: 2017-08-03 | End: 2017-11-10

## 2017-08-03 NOTE — NURSING NOTE
"Patient presented to clinic with all supplies for SQ injection teaching. Patient's mother and sister was present at visit.     RN reviewed necessary supplies: difference in needles (drawing up vs injecting), how to read a syringe, how to read medication label, disposal of sharps, and proper hand hygiene.     Discussed how to switch out needles and patient demonstrated understanding of reading syringe. RN reviewed safe needle handling (using \"scoop\" method). Patient independently mirna up proper amount of Testosterone.     RN discussed site for IM injection and reviewed proper IM injection technique. Patient practiced using injecting pad.    At end of visit, patient independently completed self-injection of .13 mL (26 mg) Testosterone into ULQ of stomach under supervision of RN.    Completed visit with discussion of refill policy.     Patient verbalized understanding and was engaged during appointment.    Dr. Hoffmann was the preceptor on site for this visit and available for questions.    Carolann Zarco RN      "

## 2017-08-03 NOTE — PROGRESS NOTES
Preceptor Attestation:   Patient seen and discussed with the resident. Assessment and plan reviewed with resident and agreed upon.   Supervising Physician:  [facility]

## 2017-08-03 NOTE — PATIENT INSTRUCTIONS
"Here is the plan from today's visit    1. Gender dysphoria in adolescent and adult  - PLASTIC SURGERY REFERRAL  - needle, disp, 18G X 1\" MISC; Use to draw up hormones once weekly  Dispense: 25 each; Refill: 3  - syringe, disposable, 1 ML MISC; Use once weekly to draw up hormones  Dispense: 25 each; Refill: 3  - Needle, Disp, 25G X 5/8\" MISC; 1 Units once a week  Dispense: 25 each; Refill: 11      Please call or return to clinic if your symptoms don't go away.    Follow up plan: in 2-4 weeks after starting testosterone injections.  Sooner if concerns.      Thank you for coming to [facility] today.  Lab Testing:  **If you had lab testing today and your results are reassuring or normal they will be mailed to you or sent through [employer] within 7 days.   **If the lab tests need quick action we will call you with the results.  The phone number we will call with results is # [phone] (home) . If this is not the best number please call our clinic and change the number.  Medication Refills:  If you need any refills please call your pharmacy and they will contact us.   If you need to  your refill at a new pharmacy, please contact the new pharmacy directly. The new pharmacy will help you get your medications transferred faster.   Scheduling:  If you have any concerns about today's visit or wish to schedule another appointment please call our office during normal business hours [phone] (8-5:00 M-F)  If a referral was made to a [facility] and you don't get a call from central scheduling please call [phone].  If a Mammogram was ordered for you at [facility] call [phone] to schedule or change your appointment.  If you had an XRay/CT/Ultrasound/MRI ordered the number is [phone] to schedule or change your radiology appointment.   Medical Concerns:  If you have urgent medical concerns please call [phone] at any time of the day.  If you have a medical emergency " please call 911.       Additional Provider Directories  [facility]- Provider Directory for all services  http://www.[facility].org/resources-for-you/provider-directory/    [place]'s lesbian degroot bisexual transgender & allied mental health providers' network  http://www.lgbttherapists.org/    Provider Directory for Health Related resources in [place]  http://mnlgbtqdirectory.org    [facility]  [address] [place] [address]    [phone]    Myles MALONEY MD-[facility]  [phone]  [facility]  [address] [place] [address]    [phone]      Plastic Surgery referral  Pt is scheduled on 4/3/18 at 1:00pm with Dr. Glover.      Thanks!

## 2017-08-03 NOTE — MR AVS SNAPSHOT
"              After Visit Summary   8/3/2017    Camilla eHnao    MRN: [id number]           Patient Information     Date Of Birth          1995        Visit Information        Provider Department      8/3/2017 9:00 AM Gen Moreno DO [facility]        Today's Diagnoses     Gender dysphoria in adolescent and adult    -  1      Care Instructions    Here is the plan from today's visit    1. Gender dysphoria in adolescent and adult  - PLASTIC SURGERY REFERRAL  - needle, disp, 18G X 1\" MISC; Use to draw up hormones once weekly  Dispense: 25 each; Refill: 3  - syringe, disposable, 1 ML MISC; Use once weekly to draw up hormones  Dispense: 25 each; Refill: 3  - Needle, Disp, 25G X 5/8\" MISC; 1 Units once a week  Dispense: 25 each; Refill: 11      Please call or return to clinic if your symptoms don't go away.    Follow up plan: in 2-4 weeks after starting testosterone injections.  Sooner if concerns.      Thank you for coming to [facility] today.  Lab Testing:  **If you had lab testing today and your results are reassuring or normal they will be mailed to you or sent through [employer] within 7 days.   **If the lab tests need quick action we will call you with the results.  The phone number we will call with results is # [phone] (home) . If this is not the best number please call our clinic and change the number.  Medication Refills:  If you need any refills please call your pharmacy and they will contact us.   If you need to  your refill at a new pharmacy, please contact the new pharmacy directly. The new pharmacy will help you get your medications transferred faster.   Scheduling:  If you have any concerns about today's visit or wish to schedule another appointment please call our office during normal business hours [phone] (8-5:00 M-F)  If a referral was made to a [facility] and you don't get a call from central scheduling please call " [phone].  If a Mammogram was ordered for you at [facility] call [phone] to schedule or change your appointment.  If you had an XRay/CT/Ultrasound/MRI ordered the number is [phone] to schedule or change your radiology appointment.   Medical Concerns:  If you have urgent medical concerns please call [phone] at any time of the day.  If you have a medical emergency please call 911.            Follow-ups after your visit        Additional Services     PLASTIC SURGERY REFERRAL       Your provider has referred you to: [facility]: [facility]astic [facility] Reconstruct[facility] [phone]      Referral to Dr. Kacey Glover for transgender female to male top surgery.   [email or web address].[facility].org/care/specialties/plastic-and-reconstructive-surgery-adult    Please be aware that coverage of these services is subject to the terms and limitations of your health insurance plan.  Call member services at your health plan with any benefit or coverage questions.      Please bring the following with you to your appointment:    (1) Any X-Rays, CTs or MRIs which have been performed.  Contact the facility where they were done to arrange for  prior to your scheduled appointment.    (2) List of current medications  (3) This referral request   (4) Any documents/labs given to you for this referral                  Your next 10 appointments already scheduled     Aug 03, 2017 10:00 AM CDT   Nurse Visit with [facility]   [facility] ([facility])    [address],  [address]  [facility] [address]   [phone]              Who to contact     Please call your clinic at [phone] to:    Ask questions about your health    Make or cancel appointments    Discuss your medicines    Learn about your test results    Speak to your doctor   If you have compliments or concerns about an experience at your clinic, or if you wish to file a complaint, please contact  "[facility] Patient Relations at [phone] or email us at [email or web address]         Additional Information About Your Visit        Minimally invasive deviceshart Information     [employer] gives you secure access to your electronic health record. If you see a primary care provider, you can also send messages to your care team and make appointments. If you have questions, please call your primary care clinic.  If you do not have a primary care provider, please call [phone] and they will assist you.      scrible is an electronic gateway that provides easy, online access to your medical records. With scrible, you can request a clinic appointment, read your test results, renew a prescription or communicate with your care team.     To access your existing account, please contact your [facility] or call [phone] for assistance.        Care EveryWhere ID     This is your Care EveryWhere ID. This could be used by other organizations to access your [place] medical records  [id number]        Your Vitals Were     Pulse Temperature Respirations Last Period Pulse Oximetry BMI (Body Mass Index)    67 97.6  F (36.4  C) (Oral) 18 07/10/2017 (Approximate) 98% 28.89 kg/m2       Blood Pressure from Last 3 Encounters:   08/03/17 108/74   07/20/17 132/82   06/26/17 123/88    Weight from Last 3 Encounters:   08/03/17 161 lb 12.8 oz (73.4 kg)   07/20/17 163 lb (73.9 kg)   06/26/17 164 lb (74.4 kg)              We Performed the Following     PLASTIC SURGERY REFERRAL          Today's Medication Changes          These changes are accurate as of: 8/3/17  9:51 AM.  If you have any questions, ask your nurse or doctor.               Start taking these medicines.        Dose/Directions    needle (disp) 18G X 1\" Misc   Used for:  Gender dysphoria in adolescent and adult   Started by:  Gen Moreno, DO        Use to draw up hormones once weekly   Quantity:  25 each   Refills:  " "3       Needle (Disp) 25G X 5/8\" Misc   Used for:  Gender dysphoria in adolescent and adult   Started by:  Gen Moreno DO        Dose:  1 Units   1 Units once a week   Quantity:  25 each   Refills:  11       syringe (disposable) 1 ML Misc   Used for:  Gender dysphoria in adolescent and adult   Started by:  Gen Moreno DO        Use once weekly to draw up hormones   Quantity:  25 each   Refills:  3            Where to get your medicines      These medications were sent to [facility] [place], [place] - [address] [facility] [address]     Phone:  [phone]     Needle (Disp) 25G X 5/8\" Misc         These medications were sent to [facility] [address] [place] [address] AT [address]  [address] [facility] [address]     Phone:  [phone]     needle (disp) 18G X 1\" Misc    syringe (disposable) 1 ML Misc                Primary Care Provider Office Phone # Fax #    Gen Moreno [phone]       [facility] [address]  [facility] [address]        Equal Access to Services     FAM JOHNSTON AH: Hadii aad ku hadasho Soomaali, waaxda luqadaha, qaybta kaalmada adeegyada, waxay idiin hayaan kevon khwilton laparviz noel. So [facility] [phone].    ATENCIÓN: Si habla español, tiene a preciado disposición servicios gratuitos de asistencia lingüística. Llame al [phone].    We comply with applicable federal civil rights laws and [place] laws. We do not discriminate on the basis of race, color, national origin, age, disability sex, sexual orientation or gender identity.            Thank you!     Thank you for choosing [facility]  for your care. Our goal is always to provide you with excellent care. Hearing back from our patients is one way we can continue to improve our services. Please take a few minutes to complete the written survey that you may receive " "in the mail after your visit with us. Thank you!             Your Updated Medication List - Protect others around you: Learn how to safely use, store and throw away your medicines at www.disposemymeds.org.          This list is accurate as of: 8/3/17  9:51 AM.  Always use your most recent med list.                   Brand Name Dispense Instructions for use Diagnosis    FLUOXETINE HCL PO      Take 20 mg by mouth daily        MIRTAZAPINE PO      Take 30 mg by mouth At Bedtime        needle (disp) 18G X 1\" Misc     25 each    Use to draw up hormones once weekly    Gender dysphoria in adolescent and adult       Needle (Disp) 25G X 5/8\" Misc     25 each    1 Units once a week    Gender dysphoria in adolescent and adult       * risperiDONE 4 MG tablet    risperDAL     Take 1 tablet (4 mg) by mouth At Bedtime        * risperDAL 0.5 MG tablet   Generic drug:  risperiDONE     60 tablet    Take 1 tablet (0.5 mg) by mouth 2 times daily Prn auditory hallucinations        syringe (disposable) 1 ML Misc     25 each    Use once weekly to draw up hormones    Gender dysphoria in adolescent and adult       testosterone cypionate 200 MG/ML injection    DEPOTESTOTERONE    2 mL    Inject 0.13 mLs (26 mg) into the muscle once a week    Gender dysphoria in adolescent and adult       VITAMIN D-3 PO      Take 5,000 Units by mouth        * Notice:  This list has 2 medication(s) that are the same as other medications prescribed for you. Read the directions carefully, and ask your doctor or other care provider to review them with you.      "

## 2017-08-03 NOTE — PROGRESS NOTES
HPI:       Camilla Henao is a 21 year old who presents for the following  Patient presents with:  RECHECK: hrt f/u. 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  Patient ready to start testosterone injections today.  Mom also feels ready.      Received records from his psychiatrist.  Patient has history of MDD and schizophrenia.  He takes fluoxetine, mirtazapine, and risperdal daily.  Mom feels he has been doing so much better since he came out to his family about being transgender.  He does not have any more suicidal thoughts anymore.  He said his last suicidal thoughts were a long time ago.  Before he came out he was very depressed and had suicidal thoughts daily.  Has been 4 months since his last cutting episode.  No aggressive behaviors his coming out.  Not feeling too depressed.      Working at [employer] which is going well.    Mom and sister present at visit and supportive.    Problem, Medication and Allergy Lists were reviewed and are current.  Patient is an established patient of this clinic.         Review of Systems:   Review of Systems   Constitutional: Negative.    Psychiatric/Behavioral: Negative for behavioral problems, dysphoric mood, self-injury and suicidal ideas.             Physical Exam:   Patient Vitals for the past 24 hrs:   BP Temp Temp src Pulse Resp SpO2 Weight   08/03/17 0857 108/74 97.6  F (36.4  C) Oral 67 18 98 % 161 lb 12.8 oz (73.4 kg)     Body mass index is 28.89 kg/(m^2).  Vitals were reviewed and were normal     Physical Exam  General: healthy, alert, no distress  Skin: substantial linear scars across forearms, thighs and legs.  None appear new.  None appear complicated.  Psych: Alert. Appearance: well groomed. Speech: responds in short phrases.   "Mood euthymic.  Affect restricted. Insight fair.       Results:     No labs drawn today.  Assessment and Plan     1. Gender dysphoria in adolescent and adult  Discussed risks and benefits of starting testosterone.  Patient expressed understanding and signed consent form.  Mood has been much better since coming out to family.  He has a history of aggressive behaviors but none recently. Denies depression or suicidal ideation.  Due to patient's psychiatric history, explained we will start low and go slow with the testosterone and monitor his mood closely.  Will start with 26mg testosterone cypionate (0.13mL) subQ weekly.  Draw up with 18g and inject with 25g.  Patient planning on getting RN injection teaching today.  Patient to follow up in 2-4 weeks.  Patient also very interested in top surgery.  Referral to Dr. Glover of plastic surgery placed.     - PLASTIC SURGERY REFERRAL  - testosterone cypionate (DEPOTESTOTERONE) 200 MG/ML injection; Inject 0.13 mLs (26 mg) subcutaneously once a week  Dispense: 2 mL; Refill: 5  - needle, disp, 18G X 1\" MISC; Use to draw up hormones once weekly  Dispense: 25 each; Refill: 11  - syringe, disposable, 1 ML MISC; Use once weekly to draw up hormones  Dispense: 25 each; Refill: 11  - Needle, Disp, 25G X 5/8\" MISC; 1 Units once a week  Dispense: 25 each; Refill: 11    Follow up: 2-4 weeks    There are no discontinued medications.  Options for treatment and follow-up care were reviewed with the patient. Camilla Henao  engaged in the decision making process and verbalized understanding of the options discussed and agreed with the final plan.    Gen Moreno, DO      "

## 2017-08-07 NOTE — TELEPHONE ENCOUNTER
Prior Authorization: Approved    Approved as of: 07/02/2017 through 08/02/2020    Pharmacy notified.  Routing to MD Mae Park August 7, 2017 at 11:40 AM

## 2017-08-11 ASSESSMENT — ENCOUNTER SYMPTOMS
DYSPHORIC MOOD: 0
CONSTITUTIONAL NEGATIVE: 1

## 2017-08-30 ENCOUNTER — OFFICE VISIT (OUTPATIENT)
Dept: FAMILY MEDICINE | Facility: CLINIC | Age: 22
End: 2017-08-30

## 2017-08-30 VITALS
HEART RATE: 78 BPM | RESPIRATION RATE: 20 BRPM | WEIGHT: 154.8 LBS | SYSTOLIC BLOOD PRESSURE: 116 MMHG | BODY MASS INDEX: 27.43 KG/M2 | TEMPERATURE: 98.1 F | DIASTOLIC BLOOD PRESSURE: 77 MMHG | OXYGEN SATURATION: 97 % | HEIGHT: 63 IN

## 2017-08-30 DIAGNOSIS — F64.0 GENDER DYSPHORIA IN ADOLESCENT AND ADULT: Primary | ICD-10-CM

## 2017-08-30 ASSESSMENT — ANXIETY QUESTIONNAIRES
GAD7 TOTAL SCORE: 0
6. BECOMING EASILY ANNOYED OR IRRITABLE: NOT AT ALL
1. FEELING NERVOUS, ANXIOUS, OR ON EDGE: NOT AT ALL
2. NOT BEING ABLE TO STOP OR CONTROL WORRYING: NOT AT ALL
3. WORRYING TOO MUCH ABOUT DIFFERENT THINGS: NOT AT ALL
5. BEING SO RESTLESS THAT IT IS HARD TO SIT STILL: NOT AT ALL
7. FEELING AFRAID AS IF SOMETHING AWFUL MIGHT HAPPEN: NOT AT ALL

## 2017-08-30 ASSESSMENT — PATIENT HEALTH QUESTIONNAIRE - PHQ9
5. POOR APPETITE OR OVEREATING: NOT AT ALL
SUM OF ALL RESPONSES TO PHQ QUESTIONS 1-9: 0

## 2017-08-30 NOTE — PROGRESS NOTES
Preceptor Attestation:   Patient seen and discussed with the resident. Assessment and plan reviewed with resident and agreed upon.   Supervising Physician:  Miles Liao MD  [facility]

## 2017-08-30 NOTE — MR AVS SNAPSHOT
After Visit Summary   8/30/2017    Camilla Henao    MRN: [id number]           Patient Information     Date Of Birth          1995        Visit Information        Provider Department      8/30/2017 1:00 PM Gen Moreno DO [facility]        Care Instructions    Here is the plan from today's visit    HRT  Keep at 0.13mL (26mg) for the next 2 months.  Will see you back in 2 months and at that time will discuss increasing dose to 0.20mL (40mg) weekly.    - let me know if anything concerning (feel free to shoot me [employer] message if any questions)  - labs in 2 months        Please call or return to clinic if your symptoms don't go away.    Follow up plan: 2 month      Thank you for coming to [facility] today.  Lab Testing:  **If you had lab testing today and your results are reassuring or normal they will be mailed to you or sent through [email or web address] within 7 days.   **If the lab tests need quick action we will call you with the results.  The phone number we will call with results is # [phone] (home) . If this is not the best number please call our clinic and change the number.  Medication Refills:  If you need any refills please call your pharmacy and they will contact us.   If you need to  your refill at a new pharmacy, please contact the new pharmacy directly. The new pharmacy will help you get your medications transferred faster.   Scheduling:  If you have any concerns about today's visit or wish to schedule another appointment please call our office during normal business hours [phone] (8-5:00 M-F)  If a referral was made to a [facility] and you don't get a call from central scheduling please call [phone].  If a Mammogram was ordered for you at [facility] call [phone] to schedule or change your appointment.  If you had an XRay/CT/Ultrasound/MRI ordered the number is [phone] to schedule or change your  radiology appointment.   Medical Concerns:  If you have urgent medical concerns please call [phone] at any time of the day.  If you have a medical emergency please call 911.            Follow-ups after your visit        Your next 10 appointments already scheduled     Apr 03, 2018  1:00 PM CDT   (Arrive by 12:45 PM)   New Plastic Surgery with Annamarie Glover MD   [facility] ([facility])    [address]  [address] [facility] [address] [phone]           Do not wear perfume.              Who to contact     Please call your clinic at [phone] to:    Ask questions about your health    Make or cancel appointments    Discuss your medicines    Learn about your test results    Speak to your doctor   If you have compliments or concerns about an experience at your clinic, or if you wish to file a complaint, please contact [facility] Patient Relations at [phone] or email us at [email or web address]         Additional Information About Your Visit        [employer] Information     Pres[employer] gives you secure access to your electronic health record. If you see a primary care provider, you can also send messages to your care team and make appointments. If you have questions, please call your primary care clinic.  If you do not have a primary care provider, please call [phone] and they will assist you.      [employer]ire is an electronic gateway that provides easy, online access to your medical records. With [employer]ire, you can request a clinic appointment, read your test results, renew a prescription or communicate with your care team.     To access your existing account, please contact your [facility] or call [phone] for assistance.        Care EveryWhere ID     This is your Care EveryWhere ID. This could be used by other organizations to access your [facility]  "records  [id number]        Your Vitals Were     Pulse Temperature Respirations Height Pulse Oximetry BMI (Body Mass Index)    78 98.1  F (36.7  C) (Oral) 20 5' 3.25\" (160.7 cm) 97% 27.21 kg/m2       Blood Pressure from Last 3 Encounters:   08/30/17 116/77   08/03/17 108/74   07/20/17 132/82    Weight from Last 3 Encounters:   08/30/17 154 lb 12.8 oz (70.2 kg)   08/03/17 161 lb 12.8 oz (73.4 kg)   07/20/17 163 lb (73.9 kg)              Today, you had the following     No orders found for display       Primary Care Provider Office Phone # Fax #    Gen Moreno,  [phone]       [facility] [address] [facility] [address]        Equal Access to Services     FAM JOHNSTON : Hadii aad ku hadasho Soomaali, waaxda luqadaha, qaybta kaalmada adeegyada, waxay antonietain hayhelenan kevon betancourt . So [facility] [phone].    ATENCIÓN: Si habla español, tiene a preciado disposición servicios gratuitos de asistencia lingüística. Veronica al [phone].    We comply with applicable federal civil rights laws and [place] laws. We do not discriminate on the basis of race, color, national origin, age, disability sex, sexual orientation or gender identity.            Thank you!     Thank you for choosing [facility]  for your care. Our goal is always to provide you with excellent care. Hearing back from our patients is one way we can continue to improve our services. Please take a few minutes to complete the written survey that you may receive in the mail after your visit with us. Thank you!             Your Updated Medication List - Protect others around you: Learn how to safely use, store and throw away your medicines at www.disposemymeds.org.          This list is accurate as of: 8/30/17  1:25 PM.  Always use your most recent med list.                   Brand Name Dispense Instructions for use Diagnosis    FLUOXETINE HCL PO      Take 20 mg by mouth daily        MIRTAZAPINE " "PO      Take 30 mg by mouth At Bedtime        needle (disp) 18G X 1\" Misc     25 each    Use to draw up hormones once weekly    Gender dysphoria in adolescent and adult       Needle (Disp) 25G X 5/8\" Misc     25 each    1 Units once a week    Gender dysphoria in adolescent and adult       * risperiDONE 4 MG tablet    risperDAL     Take 1 tablet (4 mg) by mouth At Bedtime        * risperDAL 0.5 MG tablet   Generic drug:  risperiDONE     60 tablet    Take 1 tablet (0.5 mg) by mouth 2 times daily Prn auditory hallucinations        syringe (disposable) 1 ML Misc     25 each    Use once weekly to draw up hormones    Gender dysphoria in adolescent and adult       testosterone cypionate 200 MG/ML injection    DEPOTESTOTERONE    2 mL    Inject 0.13 mLs (26 mg) into the muscle once a week    Gender dysphoria in adolescent and adult       VITAMIN D-3 PO      Take 5,000 Units by mouth        * Notice:  This list has 2 medication(s) that are the same as other medications prescribed for you. Read the directions carefully, and ask your doctor or other care provider to review them with you.      "

## 2017-08-30 NOTE — PATIENT INSTRUCTIONS
Here is the plan from today's visit    HRT  Keep at 0.13mL (26mg) for the next 2 months.  Will see you back in 2 months and at that time will discuss increasing dose to 0.20mL (40mg) weekly.    - let me know if anything concerning (feel free to shoot me [employer] message if any questions)  - labs in 2 months        Please call or return to clinic if your symptoms don't go away.    Follow up plan: 2 month      Thank you for coming to [facility] today.  Lab Testing:  **If you had lab testing today and your results are reassuring or normal they will be mailed to you or sent through [employer] within 7 days.   **If the lab tests need quick action we will call you with the results.  The phone number we will call with results is # [phone] (home) . If this is not the best number please call our clinic and change the number.  Medication Refills:  If you need any refills please call your pharmacy and they will contact us.   If you need to  your refill at a new pharmacy, please contact the new pharmacy directly. The new pharmacy will help you get your medications transferred faster.   Scheduling:  If you have any concerns about today's visit or wish to schedule another appointment please call our office during normal business hours [phone] (8-5:00 M-F)  If a referral was made to a [facility] and you don't get a call from central scheduling please call [phone].  If a Mammogram was ordered for you at [facility] call [phone] to schedule or change your appointment.  If you had an XRay/CT/Ultrasound/MRI ordered the number is [phone] to schedule or change your radiology appointment.   Medical Concerns:  If you have urgent medical concerns please call [phone] at any time of the day.  If you have a medical emergency please call [phone].

## 2017-08-30 NOTE — PROGRESS NOTES
JANELL Cleaning is a 22 year old individual that uses pronouns He/Him/His/Himself that presents today for follow up of:  masculinizing hormone therapy. Gender identity: male    Any special concerns today?  no current concerns    On hormones?  YES +++ Shot day of the week? Thursday      Due for labs?  No      +++ Refills of meds needed?  No  ---  Having irritability.  No self harm.  No hallucinations.  Denies anxiety or depression.  No SI/HI.  Still seeing psychologist Zoltan Cano, going well.    Has appt with Dr. Glover for top surgery in April of 2017.      Accompanied by mother to appointment.    Past Surgical History:   Procedure Laterality Date     CARDIAC SURGERY      As a child. No restrictions     ENT SURGERY      palate repair when young     SURGICAL HISTORY OF -   01/1995    Repair of Tetralogy     Patient Active Problem List   Diagnosis     SYNDROME V-Z VELO-CARDIO-FACIAL SYNDROME     Tetralogy of Fallot     Other specific developmental learning difficulties     CARDIOVASCULAR SCREENING; LDL GOAL LESS THAN 130     Adjustment disorder with mixed disturbance of emotions and conduct     Learning disorder     MENTAL HEALTH     Major depressive disorder, single episode, moderate (H)     Parent-child relational problem     Sibling relational problem     Borderline intellectual functioning     Abnormal finding on MRI of brain     Vitamin D deficiency     Current Outpatient Prescriptions   Medication Sig Dispense Refill     testosterone cypionate (DEPOTESTOTERONE) 200 MG/ML injection Inject 0.13 mLs (26 mg) into the muscle once a week 2 mL 5     FLUOXETINE HCL PO Take 20 mg by mouth daily       MIRTAZAPINE PO Take 30 mg by mouth At Bedtime       Cholecalciferol (VITAMIN D-3 PO) Take 5,000 Units by mouth       risperiDONE (RISPERDAL) 4 MG tablet Take 1 tablet (4 mg) by mouth At Bedtime       risperiDONE (RISPERDAL) 0.5 MG tablet Take 1 tablet (0.5 mg) by mouth 2 times daily Prn auditory hallucinations 60  "tablet      needle, disp, 18G X 1\" MISC Use to draw up hormones once weekly 25 each 11     syringe, disposable, 1 ML MISC Use once weekly to draw up hormones 25 each 11     Needle, Disp, 25G X 5/8\" MISC 1 Units once a week 25 each 11     History   Smoking Status     Never Smoker   Smokeless Tobacco     Never Used      No Known Allergies    Problem, Medication and Allergy Lists were reviewed and updated if needed.         Review of Systems:      General    Fat redistribution: no    Weight change: no HEENT    Voice change: no     Cardiovascular (CV)    Chest Pains: no    Shortness of breath: no Chest    Decreased exercise tolerance:  no    Breast changes/development: no     Gastrointestinal (GI)    Abdominal pain: no    Change in appetite: no Skin    Acne or oily skin: no    Change in hair: no     Genitourinary ()    Change in libido:no    New sexual partners: no Musculoskeletal    Leg pain or swelling: no     Psychiatric (Psych)    Depression: irritability, no depression    Anxiety/Panic: no    Mood:  \"okay\"                Physical Exam:     Vitals:    08/30/17 1303   BP: 116/77   Pulse: 78   Resp: 20   Temp: 98.1  F (36.7  C)   TempSrc: Oral   SpO2: 97%   Weight: 154 lb 12.8 oz (70.2 kg)   Height: 5' 3.25\" (160.7 cm)     BMI= Body mass index is 27.21 kg/(m^2).   Wt Readings from Last 10 Encounters:   08/30/17 154 lb 12.8 oz (70.2 kg)   08/03/17 161 lb 12.8 oz (73.4 kg)   07/20/17 163 lb (73.9 kg)   06/26/17 164 lb (74.4 kg)   04/04/17 156 lb (70.8 kg)   07/15/16 145 lb 9.6 oz (66 kg)   06/17/16 139 lb 3.2 oz (63.1 kg)   06/14/16 141 lb (64 kg)   06/04/13 129 lb 3.2 oz (58.6 kg) (56 %)*   05/02/13 126 lb 9.6 oz (57.4 kg) (52 %)*     * Growth percentiles are based on CDC 2-20 Years data.     Appearance: Female appearance and male dress    GENERAL:: healthy, alert and no distress  RESP: lungs clear to auscultation - no rales, no rhonchi, no wheezes  CV: regular rates and rhythm, normal S1 S2, no S3 or S4 and no " murmur, no click or rub -  ABDOMEN: soft, no tenderness, no  hepatosplenomegaly, no masses, normal bowel sounds  MS: extremities normal- no gross deformities noted, no edema  SKIN: no suspicious lesions, no rashes  Psych: Alert. Appearance: well groomed. Speech: responds in short phrases.  Mood euthymic.  Affect restricted. Insight fair.          Labs:   No labs today.    Assessment and Plan     Black seen for HRT follow up.    Gender dysphoria  Doing well on 26mg (0.13mL) testosterone cypionate subQ weekly that was started 4 weeks ago. His mom is overseeing injections and feels they are going well. Having irritability but no outbursts. No hallucinations. Denies depression or anxiety. Plan to recheck labs in 2 months and recheck testosterone to 40mg (0.20mL) if still tolerating well.  - will continue to monitor mood closely due to h/o MDD and schizophrenia.    Health maintenance  - 2nd HPV vaccine today    Contraception:   not needed    Counselled patient about controlled substances: Yes.     Follow up:  Follow up in 2 months.  [facility]  Questions were elicited and answered.     Gen Moreno,

## 2017-08-31 ASSESSMENT — ANXIETY QUESTIONNAIRES: GAD7 TOTAL SCORE: 0

## 2017-11-01 ENCOUNTER — TRANSFERRED RECORDS (OUTPATIENT)
Dept: HEALTH INFORMATION MANAGEMENT | Facility: CLINIC | Age: 22
End: 2017-11-01

## 2017-11-10 ENCOUNTER — OFFICE VISIT (OUTPATIENT)
Dept: FAMILY MEDICINE | Facility: CLINIC | Age: 22
End: 2017-11-10

## 2017-11-10 VITALS
DIASTOLIC BLOOD PRESSURE: 75 MMHG | WEIGHT: 157 LBS | TEMPERATURE: 97.5 F | SYSTOLIC BLOOD PRESSURE: 113 MMHG | OXYGEN SATURATION: 99 % | BODY MASS INDEX: 27.59 KG/M2 | HEART RATE: 77 BPM | RESPIRATION RATE: 18 BRPM

## 2017-11-10 DIAGNOSIS — F20.89 OTHER SCHIZOPHRENIA (H): ICD-10-CM

## 2017-11-10 DIAGNOSIS — Q93.81 VELO-CARDIO-FACIAL SYNDROME: Primary | ICD-10-CM

## 2017-11-10 DIAGNOSIS — F64.0 GENDER DYSPHORIA IN ADOLESCENT AND ADULT: ICD-10-CM

## 2017-11-10 RX ORDER — TESTOSTERONE CYPIONATE 200 MG/ML
26 INJECTION, SOLUTION INTRAMUSCULAR WEEKLY
Qty: 2 ML | Refills: 5 | Status: SHIPPED | OUTPATIENT
Start: 2017-11-10 | End: 2018-08-09

## 2017-11-10 NOTE — PROGRESS NOTES
HPI:       Camilla Henao is a 22 year old who presents for the following  Patient presents with:  RECHECK: hrt f/u and refill      Black is a 23 y/o transmale with h/o of schizophrenia and velo-cardio-facial syndrome.    Last time we started testosterone, he got angry really easily and got irritable and withdrew.  When that happened he would stop taking all his medications including his risperdal and testosterone.      When saw psychiatrist, changed him from risperdal to invega once every 28 days. He feels much better.  Feels mood is better and mom has noticed that he's more rational.  His previous psychiatrist is leaving and he sees a new one at the end of Nov.    Went to [employer] but Black struggles with communication issues.  Really hard to share and put feelings into words.  He is interested in a support group and would be willing to continue going to a therapist.  He lives in [place].      Mom present for visit.      Problem, Medication and Allergy Lists were reviewed and are current.  Patient is an established patient of this clinic.         Review of Systems:   Review of Systems   Psychiatric/Behavioral: Negative for self-injury and suicidal ideas.             Physical Exam:   Patient Vitals for the past 24 hrs:   BP Temp Temp src Pulse Resp SpO2 Weight   11/10/17 1023 113/75 97.5  F (36.4  C) Oral 77 18 99 % 157 lb (71.2 kg)     Body mass index is 27.59 kg/(m^2).  Vitals were reviewed and were normal     Physical Exam   Consitutional:  Alert, healthy, comfortable, no distress  Psych: Alert. Appearance: well groomed. Speech: responds in short phrases.  Mood euthymic.  Affect restricted. Insight fair.       Results:     No labs today.  Assessment and Plan     Gender dysphoria in adolescent and adult  Black is a 23 y/o transmale with h/o of schizophrenia and velo-cardio-facial syndrome.  Tried testosterone several months ago but developed mood changes and stopped taking his medications.   "Psychiatrist changed from PO risperdal to injectable invega sustenna and he is doing much better mood wise. Recommend starting at a low dose testosterone and titrating up slowly to see how he tolerates.  If gets mood changes with injectable testosterone, would recommend trying topical testosterone (if insurance does not cover androgel, could try cream).  Encouraged continuing to see a therapist, that way if having mood changes we can be in communication.  Cassi Medina provided therapy resources and support group resources.  - testosterone cypionate (DEPOTESTOTERONE) 200 MG/ML injection; Inject 0.13 mLs (26 mg) into the muscle once a week  Dispense: 2 mL; Refill: 5  - needle, disp, 18G X 1\" MISC; Use to draw up hormones once weekly  Dispense: 25 each; Refill: 11  - Needle, Disp, 25G X 5/8\" MISC; 1 Units once a week  Dispense: 25 each; Refill: 11  - syringe, disposable, 1 ML MISC; Use once weekly to draw up hormones  Dispense: 25 each; Refill: 11    Follow up: 1-2 months    There are no discontinued medications.  Options for treatment and follow-up care were reviewed with the patient. Camilla Henao  engaged in the decision making process and verbalized understanding of the options discussed and agreed with the final plan.    Gen Moreno, DO      "

## 2017-11-10 NOTE — PROGRESS NOTES
Briefly met patient to discuss current mental health resources needed.  Provided therapy resources and support group resources  They will follow up and check back in with the clinic should they need additional resources    EVIE Buck, [facility]  Behavioral Health Clinician  [facility]

## 2017-11-10 NOTE — PATIENT INSTRUCTIONS
"Here is the plan from today's visit    1. Gender dysphoria in adolescent and adult  - testosterone cypionate (DEPOTESTOTERONE) 200 MG/ML injection; Inject 0.13 mLs (26 mg) into the muscle once a week  Dispense: 2 mL; Refill: 5  - needle, disp, 18G X 1\" MISC; Use to draw up hormones once weekly  Dispense: 25 each; Refill: 11  - Needle, Disp, 25G X 5/8\" MISC; 1 Units once a week  Dispense: 25 each; Refill: 11  - syringe, disposable, 1 ML MISC; Use once weekly to draw up hormones  Dispense: 25 each; Refill: 11    Start with 0.13mL weekly.    Let us know if mood changes.      Plan for support group and therapist to check in with.       Follow up plan: in 1-2 months      Thank you for coming to [facility] today.  Lab Testing:  **If you had lab testing today and your results are reassuring or normal they will be mailed to you or sent through [employer] within 7 days.   **If the lab tests need quick action we will call you with the results.  The phone number we will call with results is # [phone] (home) . If this is not the best number please call our clinic and change the number.  Medication Refills:  If you need any refills please call your pharmacy and they will contact us.   If you need to  your refill at a new pharmacy, please contact the new pharmacy directly. The new pharmacy will help you get your medications transferred faster.   Scheduling:  If you have any concerns about today's visit or wish to schedule another appointment please call our office during normal business hours [phone] (8-5:00 M-F)  If a referral was made to a [facility] and you don't get a call from central scheduling please call [phone].  If a Mammogram was ordered for you at [facility] call [phone] to schedule or change your appointment.  If you had an XRay/CT/Ultrasound/MRI ordered the number is [phone] to schedule or change your radiology appointment.   Medical Concerns:  If you " have urgent medical concerns please call [phone] at any time of the day.  If you have a medical emergency please call [phone].         Therapist: Bijan Beach, PhD  Office Address:  [address] [facility] [address]  [place] [address] [phone]  [email or web address]    See additional sheets provided for support group resources

## 2017-11-10 NOTE — PROGRESS NOTES
"Preceptor Attestation:   Patient seen and discussed with the resident.   Mood has been improved on Paliperidone every 28 days.   Started low dose of Testosterone.   Consider visit with 'gender clinic.\"  Assessment and plan reviewed with resident and agreed upon.   Supervising Physician:  Carl Walton MD  [facility]    "

## 2017-11-29 ENCOUNTER — HOSPITAL ENCOUNTER (EMERGENCY)
Facility: CLINIC | Age: 22
Discharge: HOME | End: 2017-11-29
Attending: PHYSICIAN ASSISTANT | Admitting: PHYSICIAN ASSISTANT
Payer: COMMERCIAL

## 2017-11-29 ENCOUNTER — NURSE TRIAGE (OUTPATIENT)
Dept: NURSING | Facility: CLINIC | Age: 22
End: 2017-11-29

## 2017-11-29 VITALS
DIASTOLIC BLOOD PRESSURE: 73 MMHG | WEIGHT: 160 LBS | OXYGEN SATURATION: 96 % | BODY MASS INDEX: 28.12 KG/M2 | SYSTOLIC BLOOD PRESSURE: 124 MMHG | TEMPERATURE: 97.6 F

## 2017-11-29 DIAGNOSIS — Z91.148: ICD-10-CM

## 2017-11-29 PROCEDURE — 40000268 ZZH STATISTIC NO CHARGES

## 2017-11-29 PROCEDURE — 96372 THER/PROPH/DIAG INJ SC/IM: CPT

## 2017-11-29 NOTE — ED AVS SNAPSHOT
[facility]    [address] [address]    Phone:  [phone]    Fax:  [phone]                                       Camilla Henao   MRN: [id number]    Department:  [facility]   Date of Visit:  11/29/2017           After Visit Summary Signature Page     I have received my discharge instructions, and my questions have been answered. I have discussed any challenges I see with this plan with the nurse or doctor.    ..........................................................................................................................................  Patient/Patient Representative Signature      ..........................................................................................................................................  Patient Representative Print Name and Relationship to Patient    ..................................................               ................................................  Date                                            Time    ..........................................................................................................................................  Reviewed by Signature/Title    ...................................................              ..............................................  Date                                                            Time

## 2017-11-29 NOTE — TELEPHONE ENCOUNTER
Reason for Disposition    Caller has URGENT medication question about med that PCP prescribed and triager unable to answer question    Protocols used: MEDICATION QUESTION CALL-ADULT-AH    Patient went to her psychiatric clinic to day and they do not do injections.  Caller asking if  [facility] can give the injection; caller has the medication and supplies. Called [facility] and transferred patient to [facility].  Nury Villarreal RN  [facility]

## 2017-11-29 NOTE — ED AVS SNAPSHOT
[facility] Emergency Department    [address] [address]    Phone:  [phone]    Fax:  [phone]                                       Camilla Henao   MRN: [id number]    Department:  [facility] Emergency Department   Date of Visit:  11/29/2017           Patient Information     Date Of Birth          1995        Your diagnoses for this visit were:     Unable to self-administer parenteral medication        You were seen by Stella Garcia PA-C.      Follow-up Information     Follow up with [facility].    Specialty:  Infusion Therapy    Contact information:    [address] [place] [address] [phone]    Additional information:    The medical center is located at   [address] (between [facility] and   [facility] [address] in [place], [place] miles north   of [place]).        Follow up with Ha Velasco MD.    Specialty:  Family Practice    Why:  As needed, If symptoms worsen    Contact information:    [address] [place] [address]  [phone]        Future Appointments        Provider Department Dept Phone Center    4/3/2018 1:00 PM Annamarie Glover MD [facility] Plastic and Reconstructive Surgery [phone] [facility]      24 Hour Appointment Hotline       To make an appointment at any [facility], call [phone] ([phone]). If you don't have a family doctor or clinic, we will help you find one. [facility] clinics are conveniently located to serve the needs of you and your family.             Review of your medicines      Our records show that you are taking the medicines listed below. If these are incorrect, please call your family doctor or clinic.        Dose / Directions Last dose taken    * FLUOXETINE HCL PO   Dose:  20 mg        Take 20 mg by mouth daily   Refills:  0        * FLUOXETINE HCL PO   Dose:  10 mg        Take 10 mg by mouth daily   Refills:  0        INVEGA PO   Dose:  117 mg        Take  "117 mg by mouth Every 28 days   Refills:  0        MIRTAZAPINE PO   Dose:  30 mg        Take 30 mg by mouth At Bedtime   Refills:  0        needle (disp) 18G X 1\" Misc   Quantity:  25 each        Use to draw up hormones once weekly   Refills:  11        Needle (Disp) 25G X 5/8\" Misc   Dose:  1 Units   Quantity:  25 each        1 Units once a week   Refills:  11        * risperiDONE 4 MG tablet   Commonly known as:  risperDAL   Dose:  4 mg        Take 1 tablet (4 mg) by mouth At Bedtime   Refills:  0        * risperDAL 0.5 MG tablet   Dose:  0.5 mg   Quantity:  60 tablet   Generic drug:  risperiDONE        Take 1 tablet (0.5 mg) by mouth 2 times daily Prn auditory hallucinations   Refills:  0        syringe (disposable) 1 ML Misc   Quantity:  25 each        Use once weekly to draw up hormones   Refills:  11        testosterone cypionate 200 MG/ML injection   Commonly known as:  DEPOTESTOTERONE   Dose:  26 mg   Quantity:  2 mL        Inject 0.13 mLs (26 mg) into the muscle once a week   Refills:  5        VITAMIN D-3 PO   Dose:  5000 Units        Take 5,000 Units by mouth   Refills:  0        * Notice:  This list has 4 medication(s) that are the same as other medications prescribed for you. Read the directions carefully, and ask your doctor or other care provider to review them with you.            Orders Needing Specimen Collection     None      Pending Results     No orders found from 11/27/2017 to 11/30/2017.            Pending Culture Results     No orders found from 11/27/2017 to 11/30/2017.            Pending Results Instructions     If you had any lab results that were not finalized at the time of your Discharge, you can call the ED Lab Result RN at [phone]. You will be contacted by this team for any positive Lab results or changes in treatment. The nurses are available 7 days a week from 10A to 6:30P.  You can leave a message 24 hours per day and they will return your call.        Test Results From Your " Hospital Stay               Thank you for choosing [facility]       Thank you for choosing [facility] for your care. Our goal is always to provide you with excellent care. Hearing back from our patients is one way we can continue to improve our services. Please take a few minutes to complete the written survey that you may receive in the mail after you visit with us. Thank you!        [facility] Information     [facility] gives you secure access to your electronic health record. If you see a primary care provider, you can also send messages to your care team and make appointments. If you have questions, please call your primary care clinic.  If you do not have a primary care provider, please call [phone] and they will assist you.        Care EveryWhere ID     This is your Care EveryWhere ID. This could be used by other organizations to access your [facility] medical records  [id number]        Equal Access to Services     FAM JOHNSTON : Ronaldo Parks, dalton villarreal, ashanti wood. So [facility] [phone].    ATENCIÓN: Si habla español, tiene a preciado disposición servicios gratuitos de asistencia lingüística. Llame al [phone].    We comply with applicable federal civil rights laws and [place] laws. We do not discriminate on the basis of race, color, national origin, age, disability, sex, sexual orientation, or gender identity.            After Visit Summary       This is your record. Keep this with you and show to your community pharmacist(s) and doctor(s) at your next visit.

## 2017-11-30 ENCOUNTER — DOCUMENTATION ONLY (OUTPATIENT)
Dept: FAMILY MEDICINE | Facility: CLINIC | Age: 22
End: 2017-11-30

## 2017-11-30 NOTE — ED PROVIDER NOTES
History     Chief Complaint   Patient presents with     Imm/Inj     her for an injection, family has medication     HPI  Camilla Henao is a 22 year old female past medical history significant for schizophrenia, degenerative dysuria, borderline intellectual functioning, depression, tetrology of fallot who presents to the urgent care with father requesting administration of medication.  Per parent report patient receives Invega 117 mg IM q 28 days for schizophrenia which has been ordered and administered  by her psychiatrist, however patient's psychiatrist recently moved and when she was at her new providers appointment today which is date of scheduled injection, they were told that staff at that facility could not administer medication.  They are presenting with injectable medication, previously ordered and picked up at pharmacy requesting to have someone in the department administer medication.  Family states that medication has been well tolerated, no worsening complaints at this time.    Problem List:    Patient Active Problem List    Diagnosis Date Noted     Other schizophrenia (H) 11/12/2017     Priority: Medium     History of hearing voices (voice would tell her to hurt other people).  History of cutting.         Gender dysphoria in adolescent and adult 08/30/2017     Priority: Medium     Vitamin D deficiency 01/12/2017     Priority: Medium     Started on 5000 units daily in early 2017, plan recheck in spring and reduction to maintenance daily dose.       Abnormal finding on MRI of brain 07/25/2016     Priority: Medium     MENTAL HEALTH 03/10/2016     Priority: Medium     Receiving behavioral health services at [facility] Int[facility]; therapist is Paola York PsyD  [phone]       Parent-child relational problem 03/10/2016     Priority: Medium     Sibling relational problem 03/10/2016     Priority: Medium     Borderline intellectual functioning 03/10/2016     Priority: Medium      "Major depressive disorder, single episode, moderate (H) 04/24/2013     Priority: Medium     Learning disorder 04/08/2013     Priority: Medium     Adjustment disorder with mixed disturbance of emotions and conduct 04/06/2013     Priority: Medium     CARDIOVASCULAR SCREENING; LDL GOAL LESS THAN 130 02/28/2013     Priority: Medium     Other specific developmental learning difficulties 11/25/2008     Priority: Medium     See results of neuropsychological testing and recommendations from 10- (see \"other orders\" referral attachment)       SYNDROME V-Z VELO-CARDIO-FACIAL SYNDROME 04/10/2006     Priority: Medium     Tetralogy of Fallot 04/10/2006     Priority: Medium     S/P VSD closure and RV outflow patch augmentation 1/11/95  Echocardiogram 4/1/2008: excellent ventricular function, mild RV enlargement, moderate to severe pulmonary insufficiency, trivial pulmonary stenosis  Followed by Dr. Arnaldo Toledo at [facility] -- no restrictions, off SBE prophylaxis due to new recommendations, plan recheck in one year with MRI prior to visit  2009 [facility]        Past Medical History:    Past Medical History:   Diagnosis Date     * * * SBE PROPHYLAXIS * * * 4/10/2006     Other symbolic dysfunction(784.69)      Tetralogy of Fallot        Past Surgical History:    Past Surgical History:   Procedure Laterality Date     CARDIAC SURGERY      As a child. No restrictions     ENT SURGERY      palate repair when young     SURGICAL HISTORY OF -   01/1995    Repair of Tetralogy       Family History:    Family History   Problem Relation Age of Onset     Depression Other 15     paternal cousin     Depression Other 15     paternal cousin     Depression Other 15     paternal cousin     Social History:  Marital Status:  Single [1]  Social History   Substance Use Topics     Smoking status: Never Smoker     Smokeless tobacco: Never Used     Alcohol use No      Medications:  " "    FLUOXETINE HCL PO   Paliperidone (INVEGA PO)   testosterone cypionate (DEPOTESTOTERONE) 200 MG/ML injection   needle, disp, 18G X 1\" MISC   Needle, Disp, 25G X 5/8\" MISC   syringe, disposable, 1 ML MISC   FLUOXETINE HCL PO   MIRTAZAPINE PO   Cholecalciferol (VITAMIN D-3 PO)   risperiDONE (RISPERDAL) 4 MG tablet   risperiDONE (RISPERDAL) 0.5 MG tablet     Review of Systems  Negative except as in HPI  Physical Exam   BP: 124/73  Heart Rate: 73  Temp: 97.6  F (36.4  C)  Weight: 72.6 kg (160 lb)  SpO2: 96 %    Physical Exam   Constitutional: No distress.   Cardiovascular: Normal rate and regular rhythm.    Murmur heard.   Systolic murmur is present with a grade of 3/6   Pulmonary/Chest: Effort normal and breath sounds normal. No respiratory distress. She has no wheezes. She has no rales.   Skin:   Numerous linear scars on dorsum of right hand consistent with prior history of self cutting      ED Course     ED Course     Procedures          Critical Care time:  none               Labs Ordered and Resulted from Time of ED Arrival Up to the Time of Departure from the ED - No data to display    Medications   paliperidone (INVEGA SUSTENNA) SUSP 117 mg (117 mg Intramuscular Given 11/29/17 1916)     Assessments & Plan (with Medical Decision Making)     I have reviewed the nursing notes.    I have reviewed the findings, diagnosis, plan and need for follow up with the patient.       New Prescriptions    No medications on file     Final diagnoses:   Unable to self-administer parenteral medication     22 year old female presents to  with father requesting to have staff member give IM injection of Invega which had been previously prescribed and administered by psychiatrist.  I did review patient's records in epic which did indicate that patient has been receiving 117 mg IM q 28 days.  Medication was administered by RN without immediate complications.  Patient was discharged home with father with instructions to follow up as " needed if new or worsening symptoms develop, information on infusion clinic and Primary care providers given in hopes to prevent future UC/ER visits for this complaint.  Family states understanding, no questions at this time.      Disclaimer: This note consists of symbols derived from keyboarding, dictation, and/or voice recognition software. As a result, there may be errors in the script that have gone undetected.  Please consider this when interpreting information found in the chart.    11/29/2017   [facility] EMERGENCY DEPARTMENT     Stella Garcia PA-C  12/01/17 1208

## 2017-11-30 NOTE — PROGRESS NOTES
Marry Arriaga from oncology calls us about this patient and Invega injections.  I have called the mother Sharron and told her that  family practice does give Invega injections.  We need a order from her mental health provider faxed to us.  Then we order the Invega and will call when it comes in.  Mother has good understanding. Louise SIBLEY RN

## 2017-12-01 ENCOUNTER — HOSPITAL ENCOUNTER (EMERGENCY)
Facility: CLINIC | Age: 22
Discharge: LEFT BEFORE DISCHARGE | End: 2017-12-01
Attending: EMERGENCY MEDICINE
Payer: COMMERCIAL

## 2017-12-01 VITALS
OXYGEN SATURATION: 97 % | BODY MASS INDEX: 28.12 KG/M2 | HEART RATE: 110 BPM | WEIGHT: 160 LBS | RESPIRATION RATE: 16 BRPM | TEMPERATURE: 98.3 F

## 2017-12-01 DIAGNOSIS — Z53.21 PATIENT LEFT WITHOUT BEING SEEN: ICD-10-CM

## 2017-12-01 DIAGNOSIS — F33.9 RECURRENT MAJOR DEPRESSION (H): Primary | ICD-10-CM

## 2017-12-01 LAB
ALBUMIN SERPL-MCNC: 3.9 G/DL (ref 3.4–5)
ALP SERPL-CCNC: 83 U/L (ref 40–150)
ALT SERPL W P-5'-P-CCNC: 16 U/L (ref 0–50)
ANION GAP SERPL CALCULATED.3IONS-SCNC: 7 MMOL/L (ref 3–14)
AST SERPL W P-5'-P-CCNC: 12 U/L (ref 0–45)
BASOPHILS # BLD AUTO: 0 10E9/L (ref 0–0.2)
BASOPHILS NFR BLD AUTO: 0.2 %
BILIRUB SERPL-MCNC: 0.7 MG/DL (ref 0.2–1.3)
BUN SERPL-MCNC: 11 MG/DL (ref 7–30)
CALCIUM SERPL-MCNC: 8.5 MG/DL (ref 8.5–10.1)
CHLORIDE SERPL-SCNC: 103 MMOL/L (ref 94–109)
CHOLEST SERPL-MCNC: 148 MG/DL
CO2 SERPL-SCNC: 26 MMOL/L (ref 20–32)
CREAT SERPL-MCNC: 0.92 MG/DL (ref 0.52–1.04)
DIFFERENTIAL METHOD BLD: NORMAL
EOSINOPHIL # BLD AUTO: 0.1 10E9/L (ref 0–0.7)
EOSINOPHIL NFR BLD AUTO: 1.5 %
ERYTHROCYTE [DISTWIDTH] IN BLOOD BY AUTOMATED COUNT: 13.5 % (ref 10–15)
GFR SERPL CREATININE-BSD FRML MDRD: 76 ML/MIN/1.7M2
GLUCOSE SERPL-MCNC: 77 MG/DL (ref 70–99)
HBA1C MFR BLD: 4.9 % (ref 4.3–6)
HCT VFR BLD AUTO: 40.6 % (ref 35–47)
HDLC SERPL-MCNC: 46 MG/DL
HGB BLD-MCNC: 13.4 G/DL (ref 11.7–15.7)
LDLC SERPL CALC-MCNC: 80 MG/DL
LYMPHOCYTES # BLD AUTO: 1.2 10E9/L (ref 0.8–5.3)
LYMPHOCYTES NFR BLD AUTO: 14.1 %
MCH RBC QN AUTO: 30.7 PG (ref 26.5–33)
MCHC RBC AUTO-ENTMCNC: 33 G/DL (ref 31.5–36.5)
MCV RBC AUTO: 93 FL (ref 78–100)
MONOCYTES # BLD AUTO: 0.8 10E9/L (ref 0–1.3)
MONOCYTES NFR BLD AUTO: 9.3 %
NEUTROPHILS # BLD AUTO: 6.3 10E9/L (ref 1.6–8.3)
NEUTROPHILS NFR BLD AUTO: 74.9 %
NONHDLC SERPL-MCNC: 102 MG/DL
PLATELET # BLD AUTO: 152 10E9/L (ref 150–450)
POTASSIUM SERPL-SCNC: 4.1 MMOL/L (ref 3.4–5.3)
PROT SERPL-MCNC: 7.6 G/DL (ref 6.8–8.8)
RBC # BLD AUTO: 4.36 10E12/L (ref 3.8–5.2)
SODIUM SERPL-SCNC: 136 MMOL/L (ref 133–144)
T4 FREE SERPL-MCNC: 1.13 NG/DL (ref 0.76–1.46)
TRIGL SERPL-MCNC: 108 MG/DL
TSH SERPL DL<=0.005 MIU/L-ACNC: 2.46 MU/L (ref 0.4–4)
WBC # BLD AUTO: 8.5 10E9/L (ref 4–11)

## 2017-12-01 PROCEDURE — 80050 GENERAL HEALTH PANEL: CPT | Performed by: PHYSICIAN ASSISTANT

## 2017-12-01 PROCEDURE — 84439 ASSAY OF FREE THYROXINE: CPT | Performed by: PHYSICIAN ASSISTANT

## 2017-12-01 PROCEDURE — 83036 HEMOGLOBIN GLYCOSYLATED A1C: CPT | Performed by: PHYSICIAN ASSISTANT

## 2017-12-01 PROCEDURE — 80061 LIPID PANEL: CPT | Performed by: PHYSICIAN ASSISTANT

## 2017-12-01 PROCEDURE — 36415 COLL VENOUS BLD VENIPUNCTURE: CPT | Performed by: PHYSICIAN ASSISTANT

## 2017-12-02 NOTE — ED NOTES
PT and Father approached the nurses station and stated they were going to leave that PT is feeling much better. PT is noted to be ambulating with steady gait.   Father states they will return if symptoms return.

## 2017-12-05 ENCOUNTER — MEDICAL CORRESPONDENCE (OUTPATIENT)
Dept: HEALTH INFORMATION MANAGEMENT | Facility: CLINIC | Age: 22
End: 2017-12-05

## 2017-12-05 ENCOUNTER — TELEPHONE (OUTPATIENT)
Dept: FAMILY MEDICINE | Facility: CLINIC | Age: 22
End: 2017-12-05

## 2017-12-05 NOTE — TELEPHONE ENCOUNTER
Received a faxed paper order for patient for Invega. This was received from Hector Robbins PA-C at [facility]. Good for one injection. Next injection due 12/28/17. ( was given injection 11/29/17 in E.R). Order placed in chart and paper copy sent to be scanned.Patient will see [facility] again after December's injection and will have to get new order faxed.  Macho BARBER CMA      -Mom was called and notified of this plan. She will call and schedule. Macho BARBER CMA

## 2017-12-05 NOTE — ED PROVIDER NOTES
"  History     Chief Complaint   Patient presents with     Eye Problem     states having involuntary eye movements and vision sometimes gets blurry     HPI  Camilla Henao is a 22 year old female who was brought to the ED by her father for left eye twitching/involuntary movement.  Symptoms now resolved and they changed her mind and left without formal evaluation by M.D.    Problem List:    Patient Active Problem List    Diagnosis Date Noted     Other schizophrenia (H) 11/12/2017     Priority: Medium     History of hearing voices (voice would tell her to hurt other people).  History of cutting.         Gender dysphoria in adolescent and adult 08/30/2017     Priority: Medium     Vitamin D deficiency 01/12/2017     Priority: Medium     Started on 5000 units daily in early 2017, plan recheck in spring and reduction to maintenance daily dose.       Abnormal finding on MRI of brain 07/25/2016     Priority: Medium     MENTAL HEALTH 03/10/2016     Priority: Medium     Receiving behavioral health services at [facility], Adult Intensive [facility]; therapist is Paola York PsyD  [phone]       Parent-child relational problem 03/10/2016     Priority: Medium     Sibling relational problem 03/10/2016     Priority: Medium     Borderline intellectual functioning 03/10/2016     Priority: Medium     Major depressive disorder, single episode, moderate (H) 04/24/2013     Priority: Medium     Learning disorder 04/08/2013     Priority: Medium     Adjustment disorder with mixed disturbance of emotions and conduct 04/06/2013     Priority: Medium     CARDIOVASCULAR SCREENING; LDL GOAL LESS THAN 130 02/28/2013     Priority: Medium     Other specific developmental learning difficulties 11/25/2008     Priority: Medium     See results of neuropsychological testing and recommendations from 10- (see \"other orders\" referral attachment)       SYNDROME V-Z VELO-CARDIO-FACIAL SYNDROME 04/10/2006     Priority: Medium     Tetralogy of " "Fallot 04/10/2006     Priority: Medium     S/P VSD closure and RV outflow patch augmentation 1/11/95  Echocardiogram 4/1/2008: excellent ventricular function, mild RV enlargement, moderate to severe pulmonary insufficiency, trivial pulmonary stenosis  Followed by Dr. Arnaldo Toledo at [facility] -- no restrictions, off SBE prophylaxis due to new recommendations, plan recheck in one year with MRI prior to visit  2009 [facility]          Past Medical History:    Past Medical History:   Diagnosis Date     * * * SBE PROPHYLAXIS * * * 4/10/2006     Other symbolic dysfunction(784.69)      Tetralogy of Fallot        Past Surgical History:    Past Surgical History:   Procedure Laterality Date     CARDIAC SURGERY      As a child. No restrictions     ENT SURGERY      palate repair when young     SURGICAL HISTORY OF -   01/1995    Repair of Tetralogy       Family History:    Family History   Problem Relation Age of Onset     Depression Other 15     paternal cousin     Depression Other 15     paternal cousin     Depression Other 15     paternal cousin       Social History:  Marital Status:  Single [1]  Social History   Substance Use Topics     Smoking status: Never Smoker     Smokeless tobacco: Never Used     Alcohol use No        Medications:      FLUOXETINE HCL PO   Paliperidone (INVEGA PO)   testosterone cypionate (DEPOTESTOTERONE) 200 MG/ML injection   needle, disp, 18G X 1\" MISC   Needle, Disp, 25G X 5/8\" MISC   syringe, disposable, 1 ML MISC   FLUOXETINE HCL PO   MIRTAZAPINE PO   Cholecalciferol (VITAMIN D-3 PO)   risperiDONE (RISPERDAL) 4 MG tablet   risperiDONE (RISPERDAL) 0.5 MG tablet         Review of Systems  Not performed.    Physical Exam   Pulse: 110  Temp: 98.3  F (36.8  C)  Resp: 16  Weight: 72.6 kg (160 lb)  SpO2: 97 %      Physical Exam  Not performed.    ED Course     ED Course     Procedures                   Labs Ordered and Resulted from " Time of ED Arrival Up to the Time of Departure from the ED - No data to display    Assessments & Plan (with Medical Decision Making)   22-year-old female brought to the ED by her father with complaint of left eye twitching which is spontaneously resolved.  They decided to leave without formal evaluation by M.D./myself.  Left without being formally seen.    I have reviewed the nursing notes.    I have reviewed the findings, diagnosis, plan and need for follow up with the patient.      Discharge Medication List as of 12/1/2017  8:10 PM          Final diagnoses:   Patient left without being seen       12/1/2017   [facility] EMERGENCY DEPARTMENT     Richard Foster MD  12/05/17 0686

## 2017-12-27 ENCOUNTER — ALLIED HEALTH/NURSE VISIT (OUTPATIENT)
Dept: FAMILY MEDICINE | Facility: CLINIC | Age: 22
End: 2017-12-27
Payer: COMMERCIAL

## 2017-12-27 DIAGNOSIS — F20.89 OTHER SCHIZOPHRENIA (H): Primary | ICD-10-CM

## 2017-12-27 PROCEDURE — 96372 THER/PROPH/DIAG INJ SC/IM: CPT

## 2017-12-27 PROCEDURE — 99207 ZZC NO CHARGE NURSE ONLY: CPT

## 2017-12-27 NOTE — MR AVS SNAPSHOT
After Visit Summary   12/27/2017    Camilla Henao    MRN: [id number]           Patient Information     Date Of Birth          1995        Visit Information        Provider Department      12/27/2017 3:00 PM [place] FP/IM CMA/LPN [facility]        Today's Diagnoses     Other schizophrenia (H)    -  1       Follow-ups after your visit        Your next 10 appointments already scheduled     Apr 03, 2018  1:00 PM CDT   (Arrive by 12:45 PM)   New Plastic Surgery with Annamarie Glover MD   [facility] Plastic and Reconstructive Surgery ([facility] and Surgery Call)    [address]  [address] [facility] [address] [phone]           Do not wear perfume.              Who to contact     If you have questions or need follow up information about today's clinic visit or your schedule please contact [facility] directly at [phone].  Normal or non-critical lab and imaging results will be communicated to you by MyChart, letter or phone within 4 business days after the clinic has received the results. If you do not hear from us within 7 days, please contact the clinic through [facility] or phone. If you have a critical or abnormal lab result, we will notify you by phone as soon as possible.  Submit refill requests through Normal or call your pharmacy and they will forward the refill request to us. Please allow 3 business days for your refill to be completed.          Additional Information About Your Visit        MyChart Information     Normal gives you secure access to your electronic health record. If you see a primary care provider, you can also send messages to your care team and make appointments. If you have questions, please call your primary care clinic.  If you do not have a primary care provider, please call [phone] and they will assist you.        Care EveryWhere ID     This is your Care EveryWhere ID. This could be used by other  organizations to access your [facility] medical records  [id number]         Blood Pressure from Last 3 Encounters:   11/29/17 124/73   11/10/17 113/75   08/30/17 116/77    Weight from Last 3 Encounters:   12/01/17 160 lb (72.6 kg)   11/29/17 160 lb (72.6 kg)   11/10/17 157 lb (71.2 kg)              We Performed the Following     C PALIPERIDONE PALMITATE EXTENDED RELEASE, 1MG     THER/PROPH/DIAG INJ, SC/IM        Primary Care Provider Office Phone # Fax #    Ha Velasco [phone] [address] [address]        Equal Access to Services     FAM JOHNSTON : Hadii tanya Parks, wareidda cherelle, qaybta kaalmada adesudeep, ashanti noel. So [facility] [phone].    ATENCIÓN: Si habla español, tiene a preciado disposición servicios gratuitos de asistencia lingüística. Llame al [phone].    We comply with applicable federal civil rights laws and [place] laws. We do not discriminate on the basis of race, color, national origin, age, disability, sex, sexual orientation, or gender identity.            Thank you!     Thank you for choosing [facility]  for your care. Our goal is always to provide you with excellent care. Hearing back from our patients is one way we can continue to improve our services. Please take a few minutes to complete the written survey that you may receive in the mail after your visit with us. Thank you!             Your Updated Medication List - Protect others around you: Learn how to safely use, store and throw away your medicines at www.disposemymeds.org.          This list is accurate as of: 12/27/17  3:30 PM.  Always use your most recent med list.                   Brand Name Dispense Instructions for use Diagnosis    * FLUOXETINE HCL PO      Take 20 mg by mouth daily        * FLUOXETINE HCL PO      Take 10 mg by mouth daily        INVEGA PO      Take 117 mg by mouth Every 28 days        INVEGA SUSTENNA 117  "MG/0.75ML Susp   Generic drug:  paliperidone     0.75 mL    Inject 0.75 mLs (117 mg) into the muscle once for 1 dose Good for 1 injection( December 2017)  per [facility]. Paper order sent to be scanned. 12/5/17. Macho BARBER CMA        MIRTAZAPINE PO      Take 30 mg by mouth At Bedtime        needle (disp) 18G X 1\" Misc     25 each    Use to draw up hormones once weekly    Gender dysphoria in adolescent and adult       Needle (Disp) 25G X 5/8\" Misc     25 each    1 Units once a week    Gender dysphoria in adolescent and adult       * risperiDONE 4 MG tablet    risperDAL     Take 1 tablet (4 mg) by mouth At Bedtime        * risperDAL 0.5 MG tablet   Generic drug:  risperiDONE     60 tablet    Take 1 tablet (0.5 mg) by mouth 2 times daily Prn auditory hallucinations        syringe (disposable) 1 ML Misc     25 each    Use once weekly to draw up hormones    Gender dysphoria in adolescent and adult       testosterone cypionate 200 MG/ML injection    DEPOTESTOTERONE    2 mL    Inject 0.13 mLs (26 mg) into the muscle once a week    Gender dysphoria in adolescent and adult       VITAMIN D-3 PO      Take 5,000 Units by mouth        * Notice:  This list has 4 medication(s) that are the same as other medications prescribed for you. Read the directions carefully, and ask your doctor or other care provider to review them with you.      "

## 2017-12-27 NOTE — NURSING NOTE
"Chief Complaint   Patient presents with     Imm/Inj     Invega Sustenna 117 mg       Initial There were no vitals taken for this visit. Estimated body mass index is 28.12 kg/(m^2) as calculated from the following:    Height as of 8/30/17: 5' 3.25\" (1.607 m).    Weight as of 12/1/17: 160 lb (72.6 kg).    Medication Reconciliation:  unable or not appropriate to perform    Chikis Rebolledo CMA ([facility])    Informed mom this was a one time order for Invega injection today.  Mom stated [facility] were going to be faxing a new order with three (3) refills before patient's next due injection.  We are supposed to call when fax received and order placed [facility]ga 117 [facility]  "

## 2018-01-04 ENCOUNTER — MEDICAL CORRESPONDENCE (OUTPATIENT)
Dept: HEALTH INFORMATION MANAGEMENT | Facility: CLINIC | Age: 23
End: 2018-01-04

## 2018-01-04 ENCOUNTER — TELEPHONE (OUTPATIENT)
Dept: FAMILY MEDICINE | Facility: CLINIC | Age: 23
End: 2018-01-04

## 2018-01-04 NOTE — TELEPHONE ENCOUNTER
New order for Invega from [facility] [employer] received via fax today . Med was updated in chart and paper order was sent to be scanned . Invega will be ordered to be here for patient's next injection when due. Mom was called and notified that this was received and I will order med to be here for next injection the end of this month ( January 2018) . Macho BARBER CMA

## 2018-01-18 NOTE — TELEPHONE ENCOUNTER
Mother is calling to see if Medication is in, Macho GONZALES CMA confirmed that it is. Mother is aware.  Vicenta Moe  Clinic Station  Flex

## 2018-01-22 ENCOUNTER — HOSPITAL ENCOUNTER (EMERGENCY)
Facility: CLINIC | Age: 23
Discharge: HOME | End: 2018-01-22
Attending: EMERGENCY MEDICINE | Admitting: EMERGENCY MEDICINE
Payer: COMMERCIAL

## 2018-01-22 VITALS
RESPIRATION RATE: 22 BRPM | OXYGEN SATURATION: 97 % | TEMPERATURE: 98.1 F | SYSTOLIC BLOOD PRESSURE: 113 MMHG | DIASTOLIC BLOOD PRESSURE: 68 MMHG

## 2018-01-22 DIAGNOSIS — R00.2 PALPITATIONS: ICD-10-CM

## 2018-01-22 LAB
AMPHETAMINES UR QL SCN: NEGATIVE
ANION GAP SERPL CALCULATED.3IONS-SCNC: 10 MMOL/L (ref 3–14)
BARBITURATES UR QL: NEGATIVE
BASOPHILS # BLD AUTO: 0 10E9/L (ref 0–0.2)
BASOPHILS NFR BLD AUTO: 0.1 %
BENZODIAZ UR QL: NEGATIVE
BUN SERPL-MCNC: 10 MG/DL (ref 7–30)
CALCIUM SERPL-MCNC: 8.1 MG/DL (ref 8.5–10.1)
CANNABINOIDS UR QL SCN: NEGATIVE
CHLORIDE SERPL-SCNC: 108 MMOL/L (ref 94–109)
CO2 SERPL-SCNC: 22 MMOL/L (ref 20–32)
COCAINE UR QL: NEGATIVE
CREAT SERPL-MCNC: 0.83 MG/DL (ref 0.52–1.04)
DIFFERENTIAL METHOD BLD: ABNORMAL
EOSINOPHIL # BLD AUTO: 0 10E9/L (ref 0–0.7)
EOSINOPHIL NFR BLD AUTO: 0.2 %
ERYTHROCYTE [DISTWIDTH] IN BLOOD BY AUTOMATED COUNT: 13.2 % (ref 10–15)
GFR SERPL CREATININE-BSD FRML MDRD: 84 ML/MIN/1.7M2
GLUCOSE SERPL-MCNC: 108 MG/DL (ref 70–99)
HCT VFR BLD AUTO: 39.9 % (ref 35–47)
HGB BLD-MCNC: 13.4 G/DL (ref 11.7–15.7)
IMM GRANULOCYTES # BLD: 0 10E9/L (ref 0–0.4)
IMM GRANULOCYTES NFR BLD: 0.2 %
LYMPHOCYTES # BLD AUTO: 0.7 10E9/L (ref 0.8–5.3)
LYMPHOCYTES NFR BLD AUTO: 6.1 %
MCH RBC QN AUTO: 29.7 PG (ref 26.5–33)
MCHC RBC AUTO-ENTMCNC: 33.6 G/DL (ref 31.5–36.5)
MCV RBC AUTO: 89 FL (ref 78–100)
MONOCYTES # BLD AUTO: 0.5 10E9/L (ref 0–1.3)
MONOCYTES NFR BLD AUTO: 4.4 %
NEUTROPHILS # BLD AUTO: 9.4 10E9/L (ref 1.6–8.3)
NEUTROPHILS NFR BLD AUTO: 89 %
OPIATES UR QL SCN: NEGATIVE
PCP UR QL SCN: NEGATIVE
PLATELET # BLD AUTO: 152 10E9/L (ref 150–450)
POTASSIUM SERPL-SCNC: 3.3 MMOL/L (ref 3.4–5.3)
RBC # BLD AUTO: 4.51 10E12/L (ref 3.8–5.2)
SODIUM SERPL-SCNC: 140 MMOL/L (ref 133–144)
TROPONIN I SERPL-MCNC: <0.015 UG/L (ref 0–0.04)
WBC # BLD AUTO: 10.6 10E9/L (ref 4–11)

## 2018-01-22 PROCEDURE — 80048 BASIC METABOLIC PNL TOTAL CA: CPT | Performed by: EMERGENCY MEDICINE

## 2018-01-22 PROCEDURE — 80307 DRUG TEST PRSMV CHEM ANLYZR: CPT | Performed by: EMERGENCY MEDICINE

## 2018-01-22 PROCEDURE — 85025 COMPLETE CBC W/AUTO DIFF WBC: CPT | Performed by: EMERGENCY MEDICINE

## 2018-01-22 PROCEDURE — 84484 ASSAY OF TROPONIN QUANT: CPT | Performed by: EMERGENCY MEDICINE

## 2018-01-22 PROCEDURE — 99284 EMERGENCY DEPT VISIT MOD MDM: CPT | Mod: Z6 | Performed by: EMERGENCY MEDICINE

## 2018-01-22 PROCEDURE — 99283 EMERGENCY DEPT VISIT LOW MDM: CPT

## 2018-01-22 NOTE — ED AVS SNAPSHOT
" [facility] Emergency Department    [address] [address]    Phone:  [phone]    Fax:  [phone]                                       Camilla Henao   MRN: [id number]    Department:  [facility] Emergency Department   Date of Visit:  1/22/2018           Patient Information     Date Of Birth          1995        Your diagnoses for this visit were:     Palpitations        You were seen by Jaden Alford MD.        Discharge Instructions         * Heart Palpitations    Palpitations refers to the feeling that your heart is beating hard, fast or irregular. Some people describe it as \"pounding\" or \"skipped beats\". Palpitations may occur in persons with heart disease, but can also occur in healthy persons. Your doctor does not believe that anything dangerous is causing your symptoms at this time.  Heart-Related Causes:    Arrhythmia (a change from the heart's normal rhythm)    Disease of the heart valves  Non-Heart-Related Causes:    Certain medicines (such as asthma inhalers and decongestants)    Some herbal supplements, energy drinks and pills, and weight loss pills    Illegal stimulant drugs (such as cocaine, crank, methamphetamine, PCP)    Caffeine, alcohol and tobacco    Medical conditions such as thyroid disease, anemia, anxiety and panic disorder  Sometimes the cause cannot be found.  Home Care:  1. Avoid excess caffeine, alcohol, tobacco and any stimulant drugs.  2. Tell your doctor about any prescription or over-the-counter or herbal medicines you take.  Follow Up  with your doctor or as advised by our staff.  Get Prompt Medical Attention  if any of the following occur together with palpitations:    Weakness, dizziness, light-headed or fainting    Chest pain or shortness of breath    Rapid heart rate (over 120 beats per minute, at rest)    Palpitations that lasts over 20 minutes    Weakness of an arm or leg or one side of the face    Difficulty with speech or vision    " "7926-6580 Mr. Number. [address] [place] [address], [place] [address]. All rights reserved. This information is not intended as a substitute for professional medical care. Always follow your healthcare professional's instructions.  This information has been modified by your health care provider with permission from the publisher.      Follow-up cardiology.    Future Appointments        Provider Department Dept Phone Center    1/24/2018 1:45 PM [place] FP/IM CMA/LPN [facility] [phone] [facility]    4/3/2018 1:00 PM Annamarie Glover MD [facility] Plastic [facility] Re[facility] Surgery [phone] [facility]      24 Hour Appointment Hotline       To make an appointment at any [facility], call [phone] ([phone]). If you don't have a family doctor or clinic, we will help you find one. [facility] are conveniently located to serve the needs of you and your family.             Review of your medicines      Our records show that you are taking the medicines listed below. If these are incorrect, please call your family doctor or clinic.        Dose / Directions Last dose taken    * FLUOXETINE HCL PO   Dose:  20 mg        Take 20 mg by mouth daily   Refills:  0        * FLUOXETINE HCL PO   Dose:  10 mg        Take 10 mg by mouth daily   Refills:  0        INVEGA PO   Dose:  117 mg        Take 117 mg by mouth Every 28 days   Refills:  0        INVEGA SUSTENNA 117 MG/0.75ML Susp   Dose:  117 mg   Quantity:  0.75 mL   Generic drug:  paliperidone        Inject 0.75 mLs (117 mg) into the muscle every 30 days RX good for 3 injections. ( January, February, March 2018) Ordered per [facility] [employer] Robbins PA-C, [phone]. Paper order sent to be scanned.   Refills:  2        MIRTAZAPINE PO   Dose:  30 mg        Take 30 mg by mouth At Bedtime   Refills:  0        needle (disp) 18G X 1\" Misc   Quantity:  25 each        Use to draw up hormones once weekly   Refills:  11        Needle (Disp) " "25G X 5/8\" Misc   Dose:  1 Units   Quantity:  25 each        1 Units once a week   Refills:  11        * risperiDONE 4 MG tablet   Commonly known as:  risperDAL   Dose:  4 mg        Take 1 tablet (4 mg) by mouth At Bedtime   Refills:  0        * risperDAL 0.5 MG tablet   Dose:  0.5 mg   Quantity:  60 tablet   Generic drug:  risperiDONE        Take 1 tablet (0.5 mg) by mouth 2 times daily Prn auditory hallucinations   Refills:  0        syringe (disposable) 1 ML Misc   Quantity:  25 each        Use once weekly to draw up hormones   Refills:  11        testosterone cypionate 200 MG/ML injection   Commonly known as:  DEPOTESTOTERONE   Dose:  26 mg   Quantity:  2 mL        Inject 0.13 mLs (26 mg) into the muscle once a week   Refills:  5        VITAMIN D-3 PO   Dose:  5000 Units        Take 5,000 Units by mouth   Refills:  0        * Notice:  This list has 4 medication(s) that are the same as other medications prescribed for you. Read the directions carefully, and ask your doctor or other care provider to review them with you.            Procedures and tests performed during your visit     Basic metabolic panel    CBC with platelets, differential    Drug Screen Urine    EKG 12 lead    EKG 12-lead, tracing only    Troponin I      Orders Needing Specimen Collection     None      Pending Results     Date and Time Order Name Status Description    1/22/2018 0639 Drug Screen Urine In process             Pending Culture Results     Date and Time Order Name Status Description    1/22/2018 0639 Drug Screen Urine In process             Pending Results Instructions     If you had any lab results that were not finalized at the time of your Discharge, you can call the ED Lab Result RN at [phone]. You will be contacted by this team for any positive Lab results or changes in treatment. The nurses are available 7 days a week from 10A to 6:30P.  You can leave a message 24 hours per day and they will return your call.        Test " Results From Your Hospital Stay        1/22/2018  7:39 AM      Component Results     Component Value Ref Range & Units Status    WBC 10.6 4.0 - 11.0 10e9/L Final    RBC Count 4.51 3.8 - 5.2 10e12/L Final    Hemoglobin 13.4 11.7 - 15.7 g/dL Final    Hematocrit 39.9 35.0 - 47.0 % Final    MCV 89 78 - 100 fl Final    MCH 29.7 26.5 - 33.0 pg Final    MCHC 33.6 31.5 - 36.5 g/dL Final    RDW 13.2 10.0 - 15.0 % Final    Platelet Count 152 150 - 450 10e9/L Final    Diff Method Automated Method  Final    % Neutrophils 89.0 % Final    % Lymphocytes 6.1 % Final    % Monocytes 4.4 % Final    % Eosinophils 0.2 % Final    % Basophils 0.1 % Final    % Immature Granulocytes 0.2 % Final    Absolute Neutrophil 9.4 (H) 1.6 - 8.3 10e9/L Final    Absolute Lymphocytes 0.7 (L) 0.8 - 5.3 10e9/L Final    Absolute Monocytes 0.5 0.0 - 1.3 10e9/L Final    Absolute Eosinophils 0.0 0.0 - 0.7 10e9/L Final    Absolute Basophils 0.0 0.0 - 0.2 10e9/L Final    Abs Immature Granulocytes 0.0 0 - 0.4 10e9/L Final         1/22/2018  8:00 AM      Component Results     Component Value Ref Range & Units Status    Sodium 140 133 - 144 mmol/L Final    Potassium 3.3 (L) 3.4 - 5.3 mmol/L Final    Chloride 108 94 - 109 mmol/L Final    Carbon Dioxide 22 20 - 32 mmol/L Final    Anion Gap 10 3 - 14 mmol/L Final    Glucose 108 (H) 70 - 99 mg/dL Final    Urea Nitrogen 10 7 - 30 mg/dL Final    Creatinine 0.83 0.52 - 1.04 mg/dL Final    GFR Estimate 84 >60 mL/min/1.7m2 Final    Non  GFR Calc    GFR Estimate If Black >90 >60 mL/min/1.7m2 Final    African American GFR Calc    Calcium 8.1 (L) 8.5 - 10.1 mg/dL Final         1/22/2018  8:00 AM      Component Results     Component Value Ref Range & Units Status    Troponin I ES <0.015 0.000 - 0.045 ug/L Final    The 99th percentile for upper reference range is 0.045 ug/L.  Troponin values   in the range of 0.045 - 0.120 ug/L may be associated with risks of adverse   clinical events.           1/22/2018  7:54  AM      Component Results     Component Value Ref Range & Units Status    Amphetamine Qual Urine PENDING NEG^Negative Incomplete    Barbiturates Qual Urine Negative NEG^Negative Final    Cutoff for a negative barbiturate is 200 ng/mL or less.    Benzodiazepine Qual Urine Negative NEG^Negative Final    Cutoff for a negative benzodiazepine is 200 ng/mL or less.    Cannabinoids Qual Urine Negative NEG^Negative Final    Cutoff for a negative cannabinoid is 50 ng/mL or less.    Cocaine Qual Urine Negative NEG^Negative Final    Cutoff for a negative cocaine is 300 ng/mL or less.    Opiates Qualitative Urine Negative NEG^Negative Final    Cutoff for a negative opiate is 300 ng/mL or less.    PCP Qual Urine Negative NEG^Negative Final    Cutoff for a negative PCP is 25 ng/mL or less.                Thank you for choosing [facility]       Thank you for choosing [facility] for your care. Our goal is always to provide you with excellent care. Hearing back from our patients is one way we can continue to improve our services. Please take a few minutes to complete the written survey that you may receive in the mail after you visit with us. Thank you!        [facility] Information     [employer] gives you secure access to your electronic health record. If you see a primary care provider, you can also send messages to your care team and make appointments. If you have questions, please call your primary care clinic.  If you do not have a primary care provider, please call [phone] and they will assist you.        Care EveryWhere ID     This is your Care EveryWhere ID. This could be used by other organizations to access your [facility] medical records  [id number]        Equal Access to Services     FAM JOHNSTON : Hadii tanya Parks, waaxda lumagdalene, qaybta kaalmada conrado, ashanti noel. So [facility] [phone].    ATENCIÓN: Si habla español, tiene a preciado disposición servicios gratuitos de asistencia  prashanth Bhaktarod al [phone].    We comply with applicable federal civil rights laws and [place] laws. We do not discriminate on the basis of race, color, national origin, age, disability, sex, sexual orientation, or gender identity.            After Visit Summary       This is your record. Keep this with you and show to your community pharmacist(s) and doctor(s) at your next visit.

## 2018-01-22 NOTE — ED PROVIDER NOTES
Chief complaint palpitations    22-year-old female presents with father from home.  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  Patient notes palpitations and feels her heart rate is going fast this morning.  Denies chest pain or shortness of air, denies near syncope.  No recent febrile illness, nausea vomiting, diarrhea, urinary symptoms, abdominal pain.  Patient reportedly drank an energy drink yesterday, parents of also found NoDoz and upper over-the-counter energy supplements in the patient's room.  She is aware that she is not to take such medications and drinks.  Patient is also a chronic cutter, she has some fresh wounds over both upper arms.    Past medical history, medications, allergies, social history, family history all reviewed.  Patient Active Problem List   Diagnosis     SYNDROME V-Z VELO-CARDIO-FACIAL SYNDROME     Tetralogy of Fallot     Other specific developmental learning difficulties     CARDIOVASCULAR SCREENING; LDL GOAL LESS THAN 130     Adjustment disorder with mixed disturbance of emotions and conduct     Learning disorder     MENTAL HEALTH     Major depressive disorder, single episode, moderate (H)     Parent-child relational problem     Sibling relational problem     Borderline intellectual functioning     Abnormal finding on MRI of brain     Vitamin D deficiency     Gender dysphoria in adolescent and adult     Other schizophrenia (H)     ROS: All other systems reviewed and are negative.    /70  Temp 98.1  F (36.7  C) (Oral)  Resp 16  SpO2 97%  Nontoxic appearing no respiratory distress alert and oriented ×3  Head atraumatic normocephalic  Lungs clear to auscultation  Heart regular 3/6 systolic murmur, 2/6 diastolic murmur  Abdomen soft nontender bowel sounds positive no masses or HSM  Strength and sensation grossly intact throughout the extremities, gait and station normal  Speech is  fluent, good eye contact, thought processes are rational  Multiple scars over both upper extremities, open wound that has granulation tissue at the base of the left forearm, superficial wounds that are new bilateral upper arms.    EKG time 621, symptoms palpitations, sinus tachycardia rate 104, right bundle branch block, 1 PVC, no acute ST or T-wave changes, read by Dr. Jaden Alford  Repeat , symptoms none, normal sinus rhythm rate 76 axis intervals normal, right bundle branch block, no acute ST or T-wave changes, read by Dr. Jaden Alford    Results for orders placed or performed during the hospital encounter of 01/22/18   CBC with platelets, differential   Result Value Ref Range    WBC 10.6 4.0 - 11.0 10e9/L    RBC Count 4.51 3.8 - 5.2 10e12/L    Hemoglobin 13.4 11.7 - 15.7 g/dL    Hematocrit 39.9 35.0 - 47.0 %    MCV 89 78 - 100 fl    MCH 29.7 26.5 - 33.0 pg    MCHC 33.6 31.5 - 36.5 g/dL    RDW 13.2 10.0 - 15.0 %    Platelet Count 152 150 - 450 10e9/L    Diff Method Automated Method     % Neutrophils 89.0 %    % Lymphocytes 6.1 %    % Monocytes 4.4 %    % Eosinophils 0.2 %    % Basophils 0.1 %    % Immature Granulocytes 0.2 %    Absolute Neutrophil 9.4 (H) 1.6 - 8.3 10e9/L    Absolute Lymphocytes 0.7 (L) 0.8 - 5.3 10e9/L    Absolute Monocytes 0.5 0.0 - 1.3 10e9/L    Absolute Eosinophils 0.0 0.0 - 0.7 10e9/L    Absolute Basophils 0.0 0.0 - 0.2 10e9/L    Abs Immature Granulocytes 0.0 0 - 0.4 10e9/L   Basic metabolic panel   Result Value Ref Range    Sodium 140 133 - 144 mmol/L    Potassium 3.3 (L) 3.4 - 5.3 mmol/L    Chloride 108 94 - 109 mmol/L    Carbon Dioxide 22 20 - 32 mmol/L    Anion Gap 10 3 - 14 mmol/L    Glucose 108 (H) 70 - 99 mg/dL    Urea Nitrogen 10 7 - 30 mg/dL    Creatinine 0.83 0.52 - 1.04 mg/dL    GFR Estimate 84 >60 mL/min/1.7m2    GFR Estimate If Black >90 >60 mL/min/1.7m2    Calcium 8.1 (L) 8.5 - 10.1 mg/dL   Troponin I   Result Value Ref Range    Troponin I ES <0.015 0.000 - 0.045 ug/L    Drug Screen Urine   Result Value Ref Range    Amphetamine Qual Urine Negative NEG^Negative    Barbiturates Qual Urine Negative NEG^Negative    Benzodiazepine Qual Urine Negative NEG^Negative    Cannabinoids Qual Urine Negative NEG^Negative    Cocaine Qual Urine Negative NEG^Negative    Opiates Qualitative Urine Negative NEG^Negative    PCP Qual Urine Negative NEG^Negative     MDM: 22-year-old presents with palpitations.  She is using over-the-counter energy drinks and energy pills.  History of tetralogy of flow with repair as a child.  Exam is unremarkable, ECG without ischemic change, no significant ectopy or tachycardia on monitor.  Lab workup unremarkable.  No indication for further evaluation.  She is on antipsychotic Invega since the fall, I did look this up in up-to-date, has up to 15% incidence of tachycardia.  Recommend following up with psychiatry regarding continuing Invega treatment.  Return here for shortness of air, chest pain, passing out or any other concern.    Impression: Palpitations     Jaden Alford MD  01/22/18 1510

## 2018-01-22 NOTE — DISCHARGE INSTRUCTIONS
"  * Heart Palpitations    Palpitations refers to the feeling that your heart is beating hard, fast or irregular. Some people describe it as \"pounding\" or \"skipped beats\". Palpitations may occur in persons with heart disease, but can also occur in healthy persons. Your doctor does not believe that anything dangerous is causing your symptoms at this time.  Heart-Related Causes:    Arrhythmia (a change from the heart's normal rhythm)    Disease of the heart valves  Non-Heart-Related Causes:    Certain medicines (such as asthma inhalers and decongestants)    Some herbal supplements, energy drinks and pills, and weight loss pills    Illegal stimulant drugs (such as cocaine, crank, methamphetamine, PCP)    Caffeine, alcohol and tobacco    Medical conditions such as thyroid disease, anemia, anxiety and panic disorder  Sometimes the cause cannot be found.  Home Care:  1. Avoid excess caffeine, alcohol, tobacco and any stimulant drugs.  2. Tell your doctor about any prescription or over-the-counter or herbal medicines you take.  Follow Up  with your doctor or as advised by our staff.  Get Prompt Medical Attention  if any of the following occur together with palpitations:    Weakness, dizziness, light-headed or fainting    Chest pain or shortness of breath    Rapid heart rate (over 120 beats per minute, at rest)    Palpitations that lasts over 20 minutes    Weakness of an arm or leg or one side of the face    Difficulty with speech or vision    [id number] [employer]. [address] [place] [id number]. All rights reserved. This information is not intended as a substitute for professional medical care. Always follow your healthcare professional's instructions.  This information has been modified by your health care provider with permission from the publisher.      Follow-up cardiology.  "

## 2018-01-22 NOTE — ED NOTES
"Assume care of pt. PT states she Noted increase heart rate. Felt heart racing. Pt has left breast pain. \"Just an ache\". Non radiating. No SOA. No nausea. Has multiple cutting marks on both arms. Bandages placed on upper arms by Aileen HERNANDEZ. Pt rubbing left breast. No SOA. No nausea. Sinus tach 100.   "

## 2018-01-22 NOTE — ED AVS SNAPSHOT
[facility] Emergency Department    [address] [address]    Phone:  [phone]    Fax:  [phone]                                       Camilla Henao   MRN: [id number]    Department:  [facility] Emergency Department   Date of Visit:  1/22/2018           After Visit Summary Signature Page     I have received my discharge instructions, and my questions have been answered. I have discussed any challenges I see with this plan with the nurse or doctor.    ..........................................................................................................................................  Patient/Patient Representative Signature      ..........................................................................................................................................  Patient Representative Print Name and Relationship to Patient    ..................................................               ................................................  Date                                            Time    ..........................................................................................................................................  Reviewed by Signature/Title    ...................................................              ..............................................  Date                                                            Time

## 2018-01-24 ENCOUNTER — ALLIED HEALTH/NURSE VISIT (OUTPATIENT)
Dept: FAMILY MEDICINE | Facility: CLINIC | Age: 23
End: 2018-01-24
Payer: COMMERCIAL

## 2018-01-24 DIAGNOSIS — F20.89 OTHER SCHIZOPHRENIA (H): Primary | ICD-10-CM

## 2018-01-24 PROCEDURE — 96372 THER/PROPH/DIAG INJ SC/IM: CPT

## 2018-01-24 PROCEDURE — 99207 ZZC NO CHARGE NURSE ONLY: CPT

## 2018-01-24 NOTE — MR AVS SNAPSHOT
After Visit Summary   1/24/2018    Camilla Henao    MRN: [id number]           Patient Information     Date Of Birth          1995        Visit Information        Provider Department      1/24/2018 1:45 PM [place] FP/IM CMA/LPN [facility]        Today's Diagnoses     Other schizophrenia (H)    -  1       Follow-ups after your visit        Your next 10 appointments already scheduled     Apr 03, 2018  1:00 PM CDT   (Arrive by 12:45 PM)   New Plastic Surgery with Annamarie Gloevr MD   [facility] ([facility])    [address]  [address] [facility] [address] [phone]           Do not wear perfume.              Who to contact     If you have questions or need follow up information about today's clinic visit or your schedule please contact [facility] directly at [phone].  Normal or non-critical lab and imaging results will be communicated to you by MyChart, letter or phone within 4 business days after the clinic has received the results. If you do not hear from us within 7 days, please contact the clinic through Ceroshart or phone. If you have a critical or abnormal lab result, we will notify you by phone as soon as possible.  Submit refill requests through Neverware or call your pharmacy and they will forward the refill request to us. Please allow 3 business days for your refill to be completed.          Additional Information About Your Visit        MyChart Information     Neverware gives you secure access to your electronic health record. If you see a primary care provider, you can also send messages to your care team and make appointments. If you have questions, please call your primary care clinic.  If you do not have a primary care provider, please call [phone] and they will assist you.        Care EveryWhere ID     This is your Care EveryWhere ID. This could be used by other  organizations to access your [place] medical records  [id number]         Blood Pressure from Last 3 Encounters:   01/22/18 113/68   11/29/17 124/73   11/10/17 113/75    Weight from Last 3 Encounters:   12/01/17 160 lb (72.6 kg)   11/29/17 160 lb (72.6 kg)   11/10/17 157 lb (71.2 kg)              We Performed the Following     C PALIPERIDONE PALMITATE EXTENDED RELEASE, 1MG     THER/PROPH/DIAG INJ, SC/IM        Primary Care Provider Office Phone # Fax #    Ha Velasco [phone] [address] [address]        Equal Access to Services     FAM JOHNSTON : Hadii tanya Parks, wareidda cherelle, qaybta kaalmada adesudeep, ashanti noel. So [facility] [phone].    ATENCIÓN: Si habla español, tiene a preciado disposición servicios gratuitos de asistencia lingüística. Llame al [phone].    We comply with applicable federal civil rights laws and [place] laws. We do not discriminate on the basis of race, color, national origin, age, disability, sex, sexual orientation, or gender identity.            Thank you!     Thank you for choosing [facility]  for your care. Our goal is always to provide you with excellent care. Hearing back from our patients is one way we can continue to improve our services. Please take a few minutes to complete the written survey that you may receive in the mail after your visit with us. Thank you!             Your Updated Medication List - Protect others around you: Learn how to safely use, store and throw away your medicines at www.disposemymeds.org.          This list is accurate as of 1/24/18  2:10 PM.  Always use your most recent med list.                   Brand Name Dispense Instructions for use Diagnosis    * FLUOXETINE HCL PO      Take 20 mg by mouth daily        * FLUOXETINE HCL PO      Take 10 mg by mouth daily        INVEGA PO      Take 117 mg by mouth Every 28 days        INVEGA SUSTENNA 117  "MG/0.75ML Susp   Generic drug:  paliperidone     0.75 mL    Inject 0.75 mLs (117 mg) into the muscle every 30 days RX good for 3 injections. ( January, February, March 2018) Ordered per [facility] Assoc. Hector Robbins PA-C, [phone]. Paper order sent to be scanned.        MIRTAZAPINE PO      Take 30 mg by mouth At Bedtime        needle (disp) 18G X 1\" Misc     25 each    Use to draw up hormones once weekly    Gender dysphoria in adolescent and adult       Needle (Disp) 25G X 5/8\" Misc     25 each    1 Units once a week    Gender dysphoria in adolescent and adult       * risperiDONE 4 MG tablet    risperDAL     Take 1 tablet (4 mg) by mouth At Bedtime        * risperDAL 0.5 MG tablet   Generic drug:  risperiDONE     60 tablet    Take 1 tablet (0.5 mg) by mouth 2 times daily Prn auditory hallucinations        syringe (disposable) 1 ML Misc     25 each    Use once weekly to draw up hormones    Gender dysphoria in adolescent and adult       testosterone cypionate 200 MG/ML injection    DEPOTESTOTERONE    2 mL    Inject 0.13 mLs (26 mg) into the muscle once a week    Gender dysphoria in adolescent and adult       VITAMIN D-3 PO      Take 5,000 Units by mouth        * Notice:  This list has 4 medication(s) that are the same as other medications prescribed for you. Read the directions carefully, and ask your doctor or other care provider to review them with you.      "

## 2018-01-24 NOTE — NURSING NOTE
Patient here today for Invega injection. Injection was given. Per Mom was not re ordered. Patient saw Psychiatry today and they may be changing the med or dose. Patient is seeing Psychiatry again 2/7/18 and then the decision will be made regarding medication. Mom will call after that appointment to notify clinic of the plan and if the Invega needs to be ordered from wholesale pharmacy for February's injection or not. Thank You  Macho BARBER CMA

## 2018-02-07 ENCOUNTER — TELEPHONE (OUTPATIENT)
Dept: FAMILY MEDICINE | Facility: CLINIC | Age: 23
End: 2018-02-07

## 2018-02-07 DIAGNOSIS — F32.1 MAJOR DEPRESSIVE DISORDER, SINGLE EPISODE, MODERATE (H): Primary | ICD-10-CM

## 2018-02-07 DIAGNOSIS — F20.89 OTHER SCHIZOPHRENIA (H): ICD-10-CM

## 2018-02-07 NOTE — TELEPHONE ENCOUNTER
Order received from [facility] 1 time inj of abilify. Are we discontinuing the invga? We need start dates and stop dates? No refills were given . Advised them this is our new work flow that RN's will be asking for very specific orders. They may call back on md line. Folder is in the rn office . Metal file cabinet. Annamarie Salmeron RN

## 2018-02-09 NOTE — TELEPHONE ENCOUNTER
Printed prescription from [facility]'s reviewed and entered in as ordered    ASSESSMENT/PLAN  Diagnoses and all orders for this visit:    Major depressive disorder, single episode, moderate (H) &  Other schizophrenia (H)  -     ARIPiprazole ER (ABILIFY MAINTENA) 300 MG extended release inj syringe; Inject 1 Syringe (300 mg) into the muscle once for 1 dose        Ha Velasco MD  [facility]

## 2018-02-09 NOTE — TELEPHONE ENCOUNTER
Dr. Velasco,    I have clarified the abilify order and pended it for your approval. I have dc'd the invega as this was ordered by [facility] to devin. Louise SIBLEY RN      Contacted [facility], spoke to ROBERTO Raza.  To d/c the invega and the abilify is a one time order to see how the patient tolerates it.  She has a follow up appt with [facility] in March.  Mother of the patient is contacted and she knows we have received the order and are processing this at this time.  Mother is told this is a new process for us and it will go smoother hopefully next time. Louise SIBLEY RN

## 2018-02-09 NOTE — TELEPHONE ENCOUNTER
Patients mother is calling wondering if the order and injection are here for her daughter to come and get the Abilify. Please call Sharron [phone].  Vicenta Moe  Clinic Station  Flex

## 2018-07-23 ENCOUNTER — MYC MEDICAL ADVICE (OUTPATIENT)
Dept: FAMILY MEDICINE | Facility: CLINIC | Age: 23
End: 2018-07-23

## 2018-07-31 NOTE — TELEPHONE ENCOUNTER
I did get paperwork/form in my basket today.  I have never seen this patient, so will need to be seen in clinic.  Thanks,  Ha Velasco MD

## 2018-08-09 ENCOUNTER — OFFICE VISIT (OUTPATIENT)
Dept: FAMILY MEDICINE | Facility: CLINIC | Age: 23
End: 2018-08-09
Payer: COMMERCIAL

## 2018-08-09 VITALS
OXYGEN SATURATION: 98 % | HEART RATE: 71 BPM | HEIGHT: 63 IN | TEMPERATURE: 97 F | WEIGHT: 127.6 LBS | SYSTOLIC BLOOD PRESSURE: 98 MMHG | DIASTOLIC BLOOD PRESSURE: 60 MMHG | BODY MASS INDEX: 22.61 KG/M2

## 2018-08-09 DIAGNOSIS — F32.1 MAJOR DEPRESSIVE DISORDER, SINGLE EPISODE, MODERATE (H): ICD-10-CM

## 2018-08-09 DIAGNOSIS — I37.1 PULMONARY VALVE INSUFFICIENCY, UNSPECIFIED ETIOLOGY: ICD-10-CM

## 2018-08-09 DIAGNOSIS — F20.89 OTHER SCHIZOPHRENIA (H): Primary | ICD-10-CM

## 2018-08-09 DIAGNOSIS — Q21.3 TETRALOGY OF FALLOT: ICD-10-CM

## 2018-08-09 PROCEDURE — 99214 OFFICE O/P EST MOD 30 MIN: CPT | Performed by: FAMILY MEDICINE

## 2018-08-09 ASSESSMENT — PATIENT HEALTH QUESTIONNAIRE - PHQ9: 5. POOR APPETITE OR OVEREATING: SEVERAL DAYS

## 2018-08-09 ASSESSMENT — ANXIETY QUESTIONNAIRES
1. FEELING NERVOUS, ANXIOUS, OR ON EDGE: SEVERAL DAYS
3. WORRYING TOO MUCH ABOUT DIFFERENT THINGS: NOT AT ALL
6. BECOMING EASILY ANNOYED OR IRRITABLE: NOT AT ALL
5. BEING SO RESTLESS THAT IT IS HARD TO SIT STILL: NOT AT ALL
GAD7 TOTAL SCORE: 2
2. NOT BEING ABLE TO STOP OR CONTROL WORRYING: NOT AT ALL
7. FEELING AFRAID AS IF SOMETHING AWFUL MIGHT HAPPEN: NOT AT ALL
IF YOU CHECKED OFF ANY PROBLEMS ON THIS QUESTIONNAIRE, HOW DIFFICULT HAVE THESE PROBLEMS MADE IT FOR YOU TO DO YOUR WORK, TAKE CARE OF THINGS AT HOME, OR GET ALONG WITH OTHER PEOPLE: NOT DIFFICULT AT ALL

## 2018-08-09 NOTE — MR AVS SNAPSHOT
After Visit Summary   8/9/2018    Camilla Henao    MRN: [id number]           Patient Information     Date Of Birth          1995        Visit Information        Provider Department      8/9/2018 7:00 AM Ha Velasco MD [facility]        Today's Diagnoses     Major depressive disorder, single episode, moderate (H)    -  1    Tetralogy of Fallot          Care Instructions    I'll fax the forms.        Thank you for choosing [facility].  You may be receiving a survey in the mail from [employer] regarding your visit today.  Please take a few minutes to complete and return the survey to let us know how we are doing.      If you have questions or concerns, please contact us via Student [employer] Hero or you can contact your care team at [phone].    Our Clinic hours are:  Monday 6:40 am  to 7:00 pm  Tuesday -Friday 6:40 am to 5:00 pm    The [place] outpatient lab hours are:  Monday - Friday 6:10 am to 4:45 pm  Saturdays 7:00 am to 11:00 am  Appointments are required, call [phone]    If you have clinical questions after hours or would like to schedule an appointment,  call the clinic at [phone].          Follow-ups after your visit        Who to contact     If you have questions or need follow up information about today's clinic visit or your schedule please contact [facility] directly at [phone].  Normal or non-critical lab and imaging results will be communicated to you by OluKaihart, letter or phone within 4 business days after the clinic has received the results. If you do not hear from us within 7 days, please contact the clinic through "[employer]t or phone. If you have a critical or abnormal lab result, we will notify you by phone as soon as possible.  Submit refill requests through Student Loan Hero or call your pharmacy and they will forward the refill request to us. Please allow 3 business days for your refill to be completed.          Additional Information About  "Your Visit        MyChart Information     NetScientifichart gives you secure access to your electronic health record. If you see a primary care provider, you can also send messages to your care team and make appointments. If you have questions, please call your primary care clinic.  If you do not have a primary care provider, please call [phone] and they will assist you.        Care EveryWhere ID     This is your Care EveryWhere ID. This could be used by other organizations to access your [place] medical records  [id number]        Your Vitals Were     Pulse Temperature Height Last Period Pulse Oximetry BMI (Body Mass Index)    71 97  F (36.1  C) (Tympanic) 5' 2.75\" (1.594 m) 07/25/2018 (Approximate) 98% 22.78 kg/m2       Blood Pressure from Last 3 Encounters:   08/09/18 98/60   01/22/18 113/68   11/29/17 124/73    Weight from Last 3 Encounters:   08/09/18 127 lb 9.6 oz (57.9 kg)   12/01/17 160 lb (72.6 kg)   11/29/17 160 lb (72.6 kg)              We Performed the Following     DEPRESSION ACTION PLAN (DAP)        Primary Care Provider Office Phone # Fax #    Ha Velasco [phone] [address] [address]        Equal Access to Services     FAM JOHNSTON : Hadii aad ku hadasho Soomaali, waaxda luqadaha, qaybta kaalmada adeegyada, waxay idiin hayhelenan kevon betancourt . So [facility] [phone].    ATENCIÓN: Si habla español, tiene a preciado disposición servicios gratuitos de asistencia lingüística. Llame al [phone].    We comply with applicable federal civil rights laws and [place] laws. We do not discriminate on the basis of race, color, national origin, age, disability, sex, sexual orientation, or gender identity.            Thank you!     Thank you for choosing [facility]  for your care. Our goal is always to provide you with excellent care. Hearing back from our patients is one way we can continue to improve our services. Please take a few minutes to " complete the written survey that you may receive in the mail after your visit with us. Thank you!             Your Updated Medication List - Protect others around you: Learn how to safely use, store and throw away your medicines at www.disposemymeds.org.          This list is accurate as of 8/9/18  7:29 AM.  Always use your most recent med list.                   Brand Name Dispense Instructions for use Diagnosis    ARIPIPRAZOLE PO      Take 25 mg by mouth daily        FLUOXETINE HCL PO      Take 40 mg by mouth daily        HYDROXYZINE HCL PO      Take 25 mg by mouth 2 tablets up to 3 times per day as needed

## 2018-08-09 NOTE — PATIENT INSTRUCTIONS
I'll fax the forms.        Thank you for choosing [facility].  You may be receiving a survey in the mail from [facility] regarding your visit today.  Please take a few minutes to complete and return the survey to let us know how we are doing.      If you have questions or concerns, please contact us via Yeahka or you can contact your care team at [phone].    Our Clinic hours are:  Monday 6:40 am  to 7:00 pm  Tuesday -Friday 6:40 am to 5:00 pm    The [place] outpatient lab hours are:  Monday - Friday 6:10 am to 4:45 pm  Saturdays 7:00 am to 11:00 am  Appointments are required, call [phone]    If you have clinical questions after hours or would like to schedule an appointment,  call the clinic at [phone].

## 2018-08-09 NOTE — LETTER
My Depression Action Plan  Name: Camilla Henao   Date of Birth 1995  Date: 8/9/2018    My doctor: Ha Velasco   My clinic: [facility]  [address] [address]  [phone]          GREEN    ZONE   Good Control    What it looks like:     Things are going generally well. You have normal up s and down s. You may even feel depressed from time to time, but bad moods usually last less than a day.   What you need to do:  1. Continue to care for yourself (see self care plan)  2. Check your depression survival kit and update it as needed  3. Follow your physician s recommendations including any medication.  4. Do not stop taking medication unless you consult with your physician first.           YELLOW         ZONE Getting Worse    What it looks like:     Depression is starting to interfere with your life.     It may be hard to get out of bed; you may be starting to isolate yourself from others.    Symptoms of depression are starting to last most all day and this has happened for several days.     You may have suicidal thoughts but they are not constant.   What you need to do:     1. Call your care team, your response to treatment will improve if you keep your care team informed of your progress. Yellow periods are signs an adjustment may need to be made.     2. Continue your self-care, even if you have to fake it!    3. Talk to someone in your support network    4. Open up your depression survival kit           RED    ZONE Medical Alert - Get Help    What it looks like:     Depression is seriously interfering with your life.     You may experience these or other symptoms: You can t get out of bed most days, can t work or engage in other necessary activities, you have trouble taking care of basic hygiene, or basic responsibilities, thoughts of suicide or death that will not go away, self-injurious behavior.     What you need to do:  1. Call your care team and  request a same-day appointment. If they are not available (weekends or after hours) call your local crisis line, emergency room or 911.            Depression Self Care Plan / Survival Kit    Self-Care for Depression  Here s the deal. Your body and mind are really not as separate as most people think.  What you do and think affects how you feel and how you feel influences what you do and think. This means if you do things that people who feel good do, it will help you feel better.  Sometimes this is all it takes.  There is also a place for medication and therapy depending on how severe your depression is, so be sure to consult with your medical provider and/ or Behavioral Health Consultant if your symptoms are worsening or not improving.     In order to better manage my stress, I will:    Exercise  Get some form of exercise, every day. This will help reduce pain and release endorphins, the  feel good  chemicals in your brain. This is almost as good as taking antidepressants!  This is not the same as joining a gym and then never going! (they count on that by the way ) It can be as simple as just going for a walk or doing some gardening, anything that will get you moving.      Hygiene   Maintain good hygiene (Get out of bed in the morning, Make your bed, Brush your teeth, Take a shower, and Get dressed like you were going to work, even if you are unemployed).  If your clothes don't fit try to get ones that do.    Diet  I will strive to eat foods that are good for me, drink plenty of water, and avoid excessive sugar, caffeine, alcohol, and other mood-altering substances.  Some foods that are helpful in depression are: complex carbohydrates, B vitamins, flaxseed, fish or fish oil, fresh fruits and vegetables.    Psychotherapy  I agree to participate in Individual Therapy (if recommended).    Medication  If prescribed medications, I agree to take them.  Missing doses can result in serious side effects.  I understand that  drinking alcohol, or other illicit drug use, may cause potential side effects.  I will not stop my medication abruptly without first discussing it with my provider.    Staying Connected With Others  I will stay in touch with my friends, family members, and my primary care provider/team.    Use your imagination  Be creative.  We all have a creative side; it doesn t matter if it s oil painting, sand castles, or mud pies! This will also kick up the endorphins.    Witness Beauty  (AKA stop and smell the roses) Take a look outside, even in mid-winter. Notice colors, textures. Watch the squirrels and birds.     Service to others  Be of service to others.  There is always someone else in need.  By helping others we can  get out of ourselves  and remember the really important things.  This also provides opportunities for practicing all the other parts of the program.    Humor  Laugh and be silly!  Adjust your TV habits for less news and crime-drama and more comedy.    Control your stress  Try breathing deep, massage therapy, biofeedback, and meditation. Find time to relax each day.     My support system    Clinic Contact:  Phone number:    Contact 1:  Phone number:    Contact 2:  Phone number:    Zoroastrianism/:  Phone number:    Therapist:  Phone number:    Local crisis center:    Phone number:    Other community support:  Phone number:
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
## 2018-08-10 ASSESSMENT — PATIENT HEALTH QUESTIONNAIRE - PHQ9: SUM OF ALL RESPONSES TO PHQ QUESTIONS 1-9: 2

## 2018-08-10 ASSESSMENT — ANXIETY QUESTIONNAIRES: GAD7 TOTAL SCORE: 2

## 2018-08-10 NOTE — TELEPHONE ENCOUNTER
Forms faxed back to [facility], faxed, copied and sent to jaron.     Sarah PEREZ  Station

## 2018-08-16 ENCOUNTER — TELEPHONE (OUTPATIENT)
Dept: FAMILY MEDICINE | Facility: CLINIC | Age: 23
End: 2018-08-16

## 2018-08-16 NOTE — TELEPHONE ENCOUNTER
I left a message for [facility] [employer] to call clinic re: this form which is at my desk.  Dr. Velasco wants to know if they received the completed form which was done at the clinic visit on 8/9/18.

## 2018-08-17 NOTE — TELEPHONE ENCOUNTER
Received a call from Rita at [facility] and yes they did receive the fax form that was done at clinic visit.

## 2019-03-07 DIAGNOSIS — Z79.899 NEED FOR PROPHYLACTIC CHEMOTHERAPY: ICD-10-CM

## 2019-03-07 DIAGNOSIS — F41.1 GENERALIZED ANXIETY DISORDER: Primary | ICD-10-CM

## 2019-03-07 DIAGNOSIS — Z13.9 SCREENING FOR CONDITION: ICD-10-CM

## 2019-03-07 LAB
ALBUMIN SERPL-MCNC: 3.8 G/DL (ref 3.4–5)
ALP SERPL-CCNC: 54 U/L (ref 40–150)
ALT SERPL W P-5'-P-CCNC: 19 U/L (ref 0–50)
ANION GAP SERPL CALCULATED.3IONS-SCNC: 6 MMOL/L (ref 3–14)
AST SERPL W P-5'-P-CCNC: 15 U/L (ref 0–45)
BILIRUB SERPL-MCNC: 1 MG/DL (ref 0.2–1.3)
BUN SERPL-MCNC: 10 MG/DL (ref 7–30)
CALCIUM SERPL-MCNC: 8.3 MG/DL (ref 8.5–10.1)
CHLORIDE SERPL-SCNC: 104 MMOL/L (ref 94–109)
CHOLEST SERPL-MCNC: 163 MG/DL
CO2 SERPL-SCNC: 25 MMOL/L (ref 20–32)
CREAT SERPL-MCNC: 0.83 MG/DL (ref 0.52–1.04)
GFR SERPL CREATININE-BSD FRML MDRD: >90 ML/MIN/{1.73_M2}
GLUCOSE SERPL-MCNC: 80 MG/DL (ref 70–99)
HBA1C MFR BLD: 4.9 % (ref 0–5.6)
HDLC SERPL-MCNC: 77 MG/DL
LDLC SERPL CALC-MCNC: 73 MG/DL
NONHDLC SERPL-MCNC: 86 MG/DL
POTASSIUM SERPL-SCNC: 4.1 MMOL/L (ref 3.4–5.3)
PROT SERPL-MCNC: 6.9 G/DL (ref 6.8–8.8)
SODIUM SERPL-SCNC: 135 MMOL/L (ref 133–144)
TRIGL SERPL-MCNC: 67 MG/DL

## 2019-03-07 PROCEDURE — 83036 HEMOGLOBIN GLYCOSYLATED A1C: CPT | Performed by: PHYSICIAN ASSISTANT

## 2019-03-07 PROCEDURE — 80061 LIPID PANEL: CPT | Performed by: PHYSICIAN ASSISTANT

## 2019-03-07 PROCEDURE — 80053 COMPREHEN METABOLIC PANEL: CPT | Performed by: PHYSICIAN ASSISTANT

## 2019-03-07 PROCEDURE — 36415 COLL VENOUS BLD VENIPUNCTURE: CPT | Performed by: PHYSICIAN ASSISTANT

## 2019-08-01 ENCOUNTER — TRANSFERRED RECORDS (OUTPATIENT)
Dept: HEALTH INFORMATION MANAGEMENT | Facility: CLINIC | Age: 24
End: 2019-08-01

## 2019-11-03 ENCOUNTER — HEALTH MAINTENANCE LETTER (OUTPATIENT)
Age: 24
End: 2019-11-03

## 2019-11-15 ENCOUNTER — MYC MEDICAL ADVICE (OUTPATIENT)
Dept: FAMILY MEDICINE | Facility: CLINIC | Age: 24
End: 2019-11-15

## 2019-12-18 DIAGNOSIS — F41.1 GENERALIZED ANXIETY DISORDER: ICD-10-CM

## 2019-12-18 DIAGNOSIS — Z79.899 ENCOUNTER FOR LONG-TERM (CURRENT) USE OF OTHER MEDICATIONS: Primary | ICD-10-CM

## 2019-12-18 LAB
ALBUMIN SERPL-MCNC: 3.8 G/DL (ref 3.4–5)
ALP SERPL-CCNC: 84 U/L (ref 40–150)
ALT SERPL W P-5'-P-CCNC: 35 U/L (ref 0–50)
ANION GAP SERPL CALCULATED.3IONS-SCNC: 2 MMOL/L (ref 3–14)
AST SERPL W P-5'-P-CCNC: 23 U/L (ref 0–45)
BASOPHILS # BLD AUTO: 0 10E9/L (ref 0–0.2)
BASOPHILS NFR BLD AUTO: 0.7 %
BILIRUB SERPL-MCNC: 0.4 MG/DL (ref 0.2–1.3)
BUN SERPL-MCNC: 11 MG/DL (ref 7–30)
CALCIUM SERPL-MCNC: 8.2 MG/DL (ref 8.5–10.1)
CHLORIDE SERPL-SCNC: 106 MMOL/L (ref 94–109)
CHOLEST SERPL-MCNC: 145 MG/DL
CO2 SERPL-SCNC: 27 MMOL/L (ref 20–32)
CREAT SERPL-MCNC: 0.79 MG/DL (ref 0.52–1.04)
DIFFERENTIAL METHOD BLD: ABNORMAL
EOSINOPHIL # BLD AUTO: 0.1 10E9/L (ref 0–0.7)
EOSINOPHIL NFR BLD AUTO: 2 %
ERYTHROCYTE [DISTWIDTH] IN BLOOD BY AUTOMATED COUNT: 15.8 % (ref 10–15)
GFR SERPL CREATININE-BSD FRML MDRD: >90 ML/MIN/{1.73_M2}
GLUCOSE SERPL-MCNC: 68 MG/DL (ref 70–99)
HBA1C MFR BLD: 5.1 % (ref 0–5.6)
HCT VFR BLD AUTO: 36.4 % (ref 35–47)
HDLC SERPL-MCNC: 48 MG/DL
HGB BLD-MCNC: 11.7 G/DL (ref 11.7–15.7)
LDLC SERPL CALC-MCNC: 72 MG/DL
LYMPHOCYTES # BLD AUTO: 1.1 10E9/L (ref 0.8–5.3)
LYMPHOCYTES NFR BLD AUTO: 27.6 %
MCH RBC QN AUTO: 28.1 PG (ref 26.5–33)
MCHC RBC AUTO-ENTMCNC: 32.1 G/DL (ref 31.5–36.5)
MCV RBC AUTO: 88 FL (ref 78–100)
MONOCYTES # BLD AUTO: 0.7 10E9/L (ref 0–1.3)
MONOCYTES NFR BLD AUTO: 16.3 %
NEUTROPHILS # BLD AUTO: 2.2 10E9/L (ref 1.6–8.3)
NEUTROPHILS NFR BLD AUTO: 53.4 %
NONHDLC SERPL-MCNC: 97 MG/DL
PLATELET # BLD AUTO: 131 10E9/L (ref 150–450)
POTASSIUM SERPL-SCNC: 3.9 MMOL/L (ref 3.4–5.3)
PROT SERPL-MCNC: 7.5 G/DL (ref 6.8–8.8)
RBC # BLD AUTO: 4.16 10E12/L (ref 3.8–5.2)
SODIUM SERPL-SCNC: 135 MMOL/L (ref 133–144)
TRIGL SERPL-MCNC: 123 MG/DL
WBC # BLD AUTO: 4.1 10E9/L (ref 4–11)

## 2019-12-18 PROCEDURE — 80061 LIPID PANEL: CPT | Performed by: PHYSICIAN ASSISTANT

## 2019-12-18 PROCEDURE — 80053 COMPREHEN METABOLIC PANEL: CPT | Performed by: PHYSICIAN ASSISTANT

## 2019-12-18 PROCEDURE — 85025 COMPLETE CBC W/AUTO DIFF WBC: CPT | Performed by: PHYSICIAN ASSISTANT

## 2019-12-18 PROCEDURE — 83036 HEMOGLOBIN GLYCOSYLATED A1C: CPT | Performed by: PHYSICIAN ASSISTANT

## 2019-12-18 PROCEDURE — 36415 COLL VENOUS BLD VENIPUNCTURE: CPT | Performed by: PHYSICIAN ASSISTANT

## 2019-12-27 ENCOUNTER — OFFICE VISIT (OUTPATIENT)
Dept: FAMILY MEDICINE | Facility: CLINIC | Age: 24
End: 2019-12-27
Payer: COMMERCIAL

## 2019-12-27 VITALS
WEIGHT: 147.2 LBS | SYSTOLIC BLOOD PRESSURE: 116 MMHG | DIASTOLIC BLOOD PRESSURE: 64 MMHG | OXYGEN SATURATION: 98 % | TEMPERATURE: 98.1 F | BODY MASS INDEX: 27.09 KG/M2 | HEIGHT: 62 IN | HEART RATE: 80 BPM | RESPIRATION RATE: 16 BRPM

## 2019-12-27 DIAGNOSIS — Z23 ENCOUNTER FOR IMMUNIZATION: ICD-10-CM

## 2019-12-27 DIAGNOSIS — Z30.013 ENCOUNTER FOR INITIAL PRESCRIPTION OF INJECTABLE CONTRACEPTIVE: ICD-10-CM

## 2019-12-27 DIAGNOSIS — N93.8 DUB (DYSFUNCTIONAL UTERINE BLEEDING): Primary | ICD-10-CM

## 2019-12-27 LAB — HCG UR QL: NEGATIVE

## 2019-12-27 PROCEDURE — 90651 9VHPV VACCINE 2/3 DOSE IM: CPT | Performed by: FAMILY MEDICINE

## 2019-12-27 PROCEDURE — 90471 IMMUNIZATION ADMIN: CPT | Performed by: FAMILY MEDICINE

## 2019-12-27 PROCEDURE — 81025 URINE PREGNANCY TEST: CPT | Performed by: FAMILY MEDICINE

## 2019-12-27 PROCEDURE — 96372 THER/PROPH/DIAG INJ SC/IM: CPT | Performed by: FAMILY MEDICINE

## 2019-12-27 PROCEDURE — 99213 OFFICE O/P EST LOW 20 MIN: CPT | Mod: 25 | Performed by: FAMILY MEDICINE

## 2019-12-27 RX ORDER — MEDROXYPROGESTERONE ACETATE 150 MG/ML
150 INJECTION, SUSPENSION INTRAMUSCULAR
Status: DISCONTINUED | OUTPATIENT
Start: 2019-12-27 | End: 2022-02-23

## 2019-12-27 RX ADMIN — MEDROXYPROGESTERONE ACETATE 150 MG: 150 INJECTION, SUSPENSION INTRAMUSCULAR at 10:23

## 2019-12-27 ASSESSMENT — PATIENT HEALTH QUESTIONNAIRE - PHQ9
5. POOR APPETITE OR OVEREATING: NOT AT ALL
SUM OF ALL RESPONSES TO PHQ QUESTIONS 1-9: 1

## 2019-12-27 ASSESSMENT — ANXIETY QUESTIONNAIRES
5. BEING SO RESTLESS THAT IT IS HARD TO SIT STILL: NOT AT ALL
7. FEELING AFRAID AS IF SOMETHING AWFUL MIGHT HAPPEN: NOT AT ALL
IF YOU CHECKED OFF ANY PROBLEMS ON THIS QUESTIONNAIRE, HOW DIFFICULT HAVE THESE PROBLEMS MADE IT FOR YOU TO DO YOUR WORK, TAKE CARE OF THINGS AT HOME, OR GET ALONG WITH OTHER PEOPLE: NOT DIFFICULT AT ALL
6. BECOMING EASILY ANNOYED OR IRRITABLE: NOT AT ALL
1. FEELING NERVOUS, ANXIOUS, OR ON EDGE: SEVERAL DAYS
2. NOT BEING ABLE TO STOP OR CONTROL WORRYING: NOT AT ALL

## 2019-12-27 ASSESSMENT — MIFFLIN-ST. JEOR: SCORE: 1369.91

## 2019-12-27 NOTE — NURSING NOTE
Clinic Administered Medication Documentation    MEDICATION LIST:   Depo Provera Documentation    Prior to injection, verified patient identity using patient's name and date of birth. Medication was administered. Please see MAR and medication order for additional information. Patient instructed to remain in clinic for 15 minutes and report any adverse reaction to staff immediately .    BP: 116/64    LAST PAP/EXAM: No results found for: PAP  URINE HCG:negative    NEXT INJECTION DUE: 3/13/20 - 3/27/20    Was entire vial of medication used? Yes  Vial/Syringe: Single dose vial  Expiration Date:  07/2020

## 2019-12-27 NOTE — PROGRESS NOTES
"Subjective     Camilla Henao is a 24 year old female who presents to clinic today with mom for the following health issues:    HPI   Chief Complaint   Patient presents with     Contraception   Difficulty with PMS, heavier periods.  Periods regular, every 4 weeks lasting one week bleeding, heavy bleeding throughout the week. No dizziness or lightheaded.  Gets bad cramps.    Few days before period gets moodiness, irritable,; this used to be severe but is now mild with current meds. Is currently stable on medication for depression/anxiety/schizo.   Not sexually active in the past.   Patient's last menstrual period was 12/16/2019 (exact date).      Denies hx of migraine, seizure, liver disease, high blood pressure or blood clots.   Non-Smoker.    Denies fam hx of cancers or blood clots.         Review of Systems:   PSYCHIATRIC: NEGATIVE for changes in mood or affect  CONSTITUTIONAL: no fever, no chills, no fatigue, no unexpected change in weight  SKIN: no worrisome rashes, no worrisome lesions          Physical Exam:     Vitals:    12/27/19 0917   BP: 116/64   Pulse: 80   Resp: 16   Temp: 98.1  F (36.7  C)   TempSrc: Tympanic   SpO2: 98%   Weight: 66.8 kg (147 lb 3.2 oz)   Height: 1.581 m (5' 2.25\")     Body mass index is 26.71 kg/m .  GENERAL:: healthy, alert and no distress  PSYCH: Alert and oriented times 3; speech- coherent , normal rate and volume; able to articulate logical thoughts, able to abstract reason, no tangential thoughts, no hallucinations or delusions, affect- normal  Results for orders placed or performed in visit on 12/27/19 (from the past 24 hour(s))   HCG Qual, Urine ([id number])   Result Value Ref Range    HCG Qual Urine Negative NEG^Negative     Assessment and Plan   Camilla was seen today for contraception.    Diagnoses and all orders for this visit:    DUB (dysfunctional uterine bleeding): treat heavy periods with the DEPO  --discussed risks, benefits, and side effects of this new medication. " Patient understands and is in agreement.  -     medroxyPROGESTERone (DEPO-PROVERA) syringe 150 mg    Encounter for initial prescription of injectable contraceptive  -     HCG Qual, Urine ([id number])  Discussed recommendation for pap and patient declines as is not sexually active.    Patient interested in birth control.  Reviewed risks, benefits, of choices including abstinence, OCP, Patch, Nuvaring, Depo-Provera, IUD, and condoms.  Reviewed need for back-up contraception for the first month of hormonal methods. Reviewed that only abstinence and condoms provide protection from STD's.    Options for treatment and follow-up care were reviewed with the patient and mom. Camilla Henao and/or guardian engaged in the decision making process and verbalized understanding of the options discussed and agreed with the final plan.  Return to clinic in one year, sooner if needed.  Ha Velasco MD  [facility]

## 2020-03-18 ENCOUNTER — ALLIED HEALTH/NURSE VISIT (OUTPATIENT)
Dept: FAMILY MEDICINE | Facility: CLINIC | Age: 25
End: 2020-03-18
Payer: COMMERCIAL

## 2020-03-18 VITALS — DIASTOLIC BLOOD PRESSURE: 61 MMHG | SYSTOLIC BLOOD PRESSURE: 122 MMHG | HEART RATE: 76 BPM

## 2020-03-18 DIAGNOSIS — N93.8 DUB (DYSFUNCTIONAL UTERINE BLEEDING): Primary | ICD-10-CM

## 2020-03-18 PROCEDURE — 96372 THER/PROPH/DIAG INJ SC/IM: CPT

## 2020-03-18 PROCEDURE — 99207 ZZC NO CHARGE NURSE ONLY: CPT

## 2020-03-18 RX ADMIN — MEDROXYPROGESTERONE ACETATE 150 MG: 150 INJECTION, SUSPENSION INTRAMUSCULAR at 09:20

## 2020-03-18 NOTE — NURSING NOTE
"Chief Complaint   Patient presents with     Imm/Inj     Depo provera injection.       Initial /61   Pulse 76  Estimated body mass index is 26.71 kg/m  as calculated from the following:    Height as of 12/27/19: 1.581 m (5' 2.25\").    Weight as of 12/27/19: 66.8 kg (147 lb 3.2 oz).    Medication Reconciliation:  unable or not appropriate to perform    Chikis Rebolledo CMA ([facility])    Clinic Administered Medication Documentation      Depo Provera Documentation    URINE HCG: not indicated    Depo-Provera Standing Order inclusion/exclusion criteria reviewed.   Patient meets: inclusion criteria     BP: 122/61  LAST PAP/EXAM: No results found for: PAP    Prior to injection, verified patient identity using patient's name and date of birth. Medication was administered. Please see MAR and medication order for additional information.     Was entire vial of medication used? Yes  Vial/Syringe: Single dose vial  Expiration Date:  10/2020    Patient instructed to report any adverse reaction to staff immediately .  NEXT INJECTION DUE: 6/3/20 - 6/17/20      "

## 2020-06-09 ENCOUNTER — ALLIED HEALTH/NURSE VISIT (OUTPATIENT)
Dept: FAMILY MEDICINE | Facility: CLINIC | Age: 25
End: 2020-06-09
Payer: COMMERCIAL

## 2020-06-09 VITALS — SYSTOLIC BLOOD PRESSURE: 130 MMHG | WEIGHT: 159.5 LBS | DIASTOLIC BLOOD PRESSURE: 80 MMHG | BODY MASS INDEX: 28.94 KG/M2

## 2020-06-09 DIAGNOSIS — N93.8 DUB (DYSFUNCTIONAL UTERINE BLEEDING): Primary | ICD-10-CM

## 2020-06-09 DIAGNOSIS — Z30.013 ENCOUNTER FOR INITIAL PRESCRIPTION OF INJECTABLE CONTRACEPTIVE: ICD-10-CM

## 2020-06-09 PROCEDURE — 99207 ZZC NO CHARGE NURSE ONLY: CPT

## 2020-06-09 PROCEDURE — 96372 THER/PROPH/DIAG INJ SC/IM: CPT

## 2020-06-09 RX ORDER — MEDROXYPROGESTERONE ACETATE 150 MG/ML
150 INJECTION, SUSPENSION INTRAMUSCULAR
Status: COMPLETED | OUTPATIENT
Start: 2020-06-09 | End: 2020-08-26

## 2020-06-09 RX ADMIN — MEDROXYPROGESTERONE ACETATE 150 MG: 150 INJECTION, SUSPENSION INTRAMUSCULAR at 14:51

## 2020-06-09 NOTE — NURSING NOTE
Clinic Administered Medication Documentation      Depo Provera Documentation    URINE HCG: not indicated    Depo-Provera Standing Order inclusion/exclusion criteria reviewed.   Patient meets: inclusion criteria     BP: 130/80  LAST PAP/EXAM: No results found for: PAP    Prior to injection, verified patient identity using patient's name and date of birth. Medication was administered. Please see MAR and medication order for additional information.     Was entire vial of medication used? Yes  Vial/Syringe: Single dose vial  Expiration Date:  1/2021    Patient instructed to report any adverse reaction to staff immediately .  NEXT INJECTION DUE: 8/25/20 - 9/8/20    Macho BARBER CMA

## 2020-08-26 ENCOUNTER — TELEPHONE (OUTPATIENT)
Dept: FAMILY MEDICINE | Facility: CLINIC | Age: 25
End: 2020-08-26

## 2020-08-26 ENCOUNTER — ALLIED HEALTH/NURSE VISIT (OUTPATIENT)
Dept: FAMILY MEDICINE | Facility: CLINIC | Age: 25
End: 2020-08-26
Payer: COMMERCIAL

## 2020-08-26 VITALS — SYSTOLIC BLOOD PRESSURE: 128 MMHG | BODY MASS INDEX: 30.1 KG/M2 | WEIGHT: 165.9 LBS | DIASTOLIC BLOOD PRESSURE: 75 MMHG

## 2020-08-26 DIAGNOSIS — Z30.013 ENCOUNTER FOR INITIAL PRESCRIPTION OF INJECTABLE CONTRACEPTIVE: Primary | ICD-10-CM

## 2020-08-26 DIAGNOSIS — Z30.42 SURVEILLANCE OF CONTRACEPTIVE INJECTION: Primary | ICD-10-CM

## 2020-08-26 PROCEDURE — 99207 ZZC NO CHARGE NURSE ONLY: CPT

## 2020-08-26 PROCEDURE — 96372 THER/PROPH/DIAG INJ SC/IM: CPT

## 2020-08-26 RX ORDER — MEDROXYPROGESTERONE ACETATE 150 MG/ML
150 INJECTION, SUSPENSION INTRAMUSCULAR
Status: DISCONTINUED | OUTPATIENT
Start: 2020-08-26 | End: 2020-11-17

## 2020-08-26 RX ADMIN — MEDROXYPROGESTERONE ACETATE 150 MG: 150 INJECTION, SUSPENSION INTRAMUSCULAR at 09:12

## 2020-08-26 NOTE — TELEPHONE ENCOUNTER
Patient was here today and received depo provera injection. Last one in MAR order. Please advise and renew order. ( pended) Thank You     Macho BARBER CMA

## 2020-08-26 NOTE — LETTER
August 27, 2020      Camilla Henao  [address]   [place] [address]        Dear Camilla,     We received a refill request for your Depo-provera medication injection.  One injection was ordered.  You are due next year for an office visit for further refills.  This appointment can be done via a virtual or office visit.  Please call [phone] to schedule an appointment.        Sincerely,        Ha Velasco MD

## 2020-08-26 NOTE — TELEPHONE ENCOUNTER
Ordered one injection.  Notify patient that she needs an appointment for renewal for the DEPO for the next year before next injection.  Can be a virtual or in clinic appt.  Thank you,  Ha Velasco MD

## 2020-08-26 NOTE — NURSING NOTE
Clinic Administered Medication Documentation      Depo Provera Documentation    URINE HCG: not indicated    Depo-Provera Standing Order inclusion/exclusion criteria reviewed.   Patient meets: inclusion criteria     BP: 128/75  LAST PAP/EXAM: No results found for: PAP    Prior to injection, verified patient identity using patient's name and date of birth. Medication was administered. Please see MAR and medication order for additional information.     Was entire vial of medication used? Yes  Vial/Syringe: Single dose vial  Expiration Date:  1/2021    Patient instructed to report any adverse reaction to staff immediately .  NEXT INJECTION DUE: 11/11/20 - 11/25/20    Macho BARBER CMA

## 2020-10-28 ENCOUNTER — TELEPHONE (OUTPATIENT)
Dept: FAMILY MEDICINE | Facility: CLINIC | Age: 25
End: 2020-10-28

## 2020-10-28 NOTE — TELEPHONE ENCOUNTER
She declined pap in the past.  But let's just plan to ask her when she comes in to clinic.  No need to send letter.  Thank you,  Ha Velasco MD

## 2020-10-28 NOTE — TELEPHONE ENCOUNTER
Panel Management Review      Patient has the following on her problem list:     Depression / Dysthymia review    Measure:  Needs PHQ-9 score of 4 or less during index window.  Administer PHQ-9 and if score is 5 or more, send encounter to provider for next steps.    5 - 7 month window range:     PHQ-9 SCORE 8/30/2017 8/9/2018 12/27/2019   PHQ-9 Total Score - - -   PHQ-9 Total Score 0 2 1       If PHQ-9 recheck is 5 or more, route to provider for next steps.    Patient is due for:  PHQ9      Composite cancer screening  Chart review shows that this patient is due/due soon for the following Pap Smear  Summary:    Patient is due/failing the following:   FOLLOW UP, PAP and PHYSICAL    Action needed:   Routed to provider for review.    Type of outreach:    None, routed to provider for review.    Questions for provider review:    Does not look like patient has had a pap smear before. Does patient need a pap smear?                                                                                                                                    Ashli Tellez MA       Chart routed to Provider .

## 2020-11-16 ENCOUNTER — HEALTH MAINTENANCE LETTER (OUTPATIENT)
Age: 25
End: 2020-11-16

## 2020-11-17 ENCOUNTER — OFFICE VISIT (OUTPATIENT)
Dept: FAMILY MEDICINE | Facility: CLINIC | Age: 25
End: 2020-11-17
Payer: COMMERCIAL

## 2020-11-17 VITALS
WEIGHT: 165.4 LBS | SYSTOLIC BLOOD PRESSURE: 116 MMHG | TEMPERATURE: 98.7 F | OXYGEN SATURATION: 96 % | DIASTOLIC BLOOD PRESSURE: 74 MMHG | HEART RATE: 99 BPM | BODY MASS INDEX: 30.01 KG/M2 | RESPIRATION RATE: 16 BRPM

## 2020-11-17 DIAGNOSIS — N93.8 DUB (DYSFUNCTIONAL UTERINE BLEEDING): Primary | ICD-10-CM

## 2020-11-17 DIAGNOSIS — F20.89 OTHER SCHIZOPHRENIA (H): ICD-10-CM

## 2020-11-17 PROCEDURE — 99213 OFFICE O/P EST LOW 20 MIN: CPT | Performed by: FAMILY MEDICINE

## 2020-11-17 ASSESSMENT — PATIENT HEALTH QUESTIONNAIRE - PHQ9
SUM OF ALL RESPONSES TO PHQ QUESTIONS 1-9: 1
5. POOR APPETITE OR OVEREATING: NOT AT ALL

## 2020-11-17 ASSESSMENT — ANXIETY QUESTIONNAIRES
7. FEELING AFRAID AS IF SOMETHING AWFUL MIGHT HAPPEN: NOT AT ALL
IF YOU CHECKED OFF ANY PROBLEMS ON THIS QUESTIONNAIRE, HOW DIFFICULT HAVE THESE PROBLEMS MADE IT FOR YOU TO DO YOUR WORK, TAKE CARE OF THINGS AT HOME, OR GET ALONG WITH OTHER PEOPLE: NOT DIFFICULT AT ALL
GAD7 TOTAL SCORE: 0
6. BECOMING EASILY ANNOYED OR IRRITABLE: NOT AT ALL
3. WORRYING TOO MUCH ABOUT DIFFERENT THINGS: NOT AT ALL
2. NOT BEING ABLE TO STOP OR CONTROL WORRYING: NOT AT ALL
5. BEING SO RESTLESS THAT IT IS HARD TO SIT STILL: NOT AT ALL
1. FEELING NERVOUS, ANXIOUS, OR ON EDGE: NOT AT ALL

## 2020-11-17 NOTE — PATIENT INSTRUCTIONS
Start the pill today or tomorrow with your other morning medications.    ORAL CONTRACEPTIVE START INSTRUCTION   Take pill by mouth daily at same time every day  Expect the period during blank Pill section  Start next packet on time, if late start use condom in addition that month  - should use  back up the pill with a condom during the first cycle.  - You  need  additional protection, such as a condom, to prevent exposure to sexually transmitted diseases  and HIV     MISSED PILL INSTRUCTION ( expect breakthrough bleeding)   - If miss one pill take it when you remember, if its the next day take 2 at once.   - If you miss 2 pills in a row - take 2 pills for the next 2 days and use a back up condom until the next  cycle    SIDE EFFECTS  - bleeding in between periods is normal for first 2 months - should decrease by the third month  - blood clots in the leg that can travel to heart, lungs or brain are rare.    smoking increases that risk.   - redness, heat, swelling in the legs may represent a blood clot so call immediately  - Hormones can cause mood changes, headaches, and stomach upset.  These usually go down after a few months but if they don't we can change the pill to a different hormone pill.    MEDICATION INTERACTION  - some medication can interfere with oral contraceptive pills such as antibiotics. Use condoms during the month on antibiotics or other prescription medication that may decrease oral contraceptive pill  effectiveness

## 2020-11-17 NOTE — PROGRESS NOTES
Subjective     Camilla Henao is a 25 year old female who presents to clinic today for the following health issues:    HPI         Medication Followup of Depo injection/ birth control    Taking Medication as prescribed: yes    Side Effects:  Yes- weight gain. They're wondering about other options for birth control.    Medication Helping Symptoms:  yes   Has had history of dysfunctional uterine bleeding (regular periods with heavy bleeding, bad cramps and moodiness) so in December started DEPO which was help bleeding and cramping, only got spotting rarely.    No personal or family history of PE, DVT, migraine headache with aura, or HTN.    No LMP recorded. Patient has had an injection.      Review of Systems   Constitutional, HEENT, cardiovascular, pulmonary, gi and gu systems are negative, except as otherwise noted.      Objective    /74   Pulse 99   Temp 98.7  F (37.1  C) (Tympanic)   Resp 16   Wt 75 kg (165 lb 6.4 oz)   SpO2 96%   BMI 30.01 kg/m    Body mass index is 30.01 kg/m .  Physical Exam   GENERAL: healthy, alert and no distress  PSYCH: mentation appears normal, affect normal/bright    No results found for this or any previous visit (from the past 24 hour(s)).        Assessment & Plan     Camilla was seen today for recheck medication.    Diagnoses and all orders for this visit:    DUB (dysfunctional uterine bleeding): plan to switch from DEPO to COCP. Goal is to stop periods and minimize bleeding and cramping, so plan every 3 months periods.  -discussed risks, benefits, and side effects of this new medication. Patient understands and is in agreement.  -     norgestrel-ethinyl estradiol (LO/OVRAL) 0.3-30 MG-MCG tablet; Take 1 tablet by mouth daily Take active pill continuously for 3 months, then placebo week to get period every 3 months.    Other schizophrenia (H): following with psychiatry, stable on current medications.         BMI:   Estimated body mass index is 30.01 kg/m  as calculated from  "the following:    Height as of 12/27/19: 1.581 m (5' 2.25\").    Weight as of this encounter: 75 kg (165 lb 6.4 oz).   Weight management plan: Discussed healthy diet and exercise guidelines         Patient Instructions   Start the pill today or tomorrow with your other morning medications.    ORAL CONTRACEPTIVE START INSTRUCTION   Take pill by mouth daily at same time every day  Expect the period during blank Pill section  Start next packet on time, if late start use condom in addition that month  - should use  back up the pill with a condom during the first cycle.  - You  need  additional protection, such as a condom, to prevent exposure to sexually transmitted diseases  and HIV     MISSED PILL INSTRUCTION ( expect breakthrough bleeding)   - If miss one pill take it when you remember, if its the next day take 2 at once.   - If you miss 2 pills in a row - take 2 pills for the next 2 days and use a back up condom until the next  cycle    SIDE EFFECTS  - bleeding in between periods is normal for first 2 months - should decrease by the third month  - blood clots in the leg that can travel to heart, lungs or brain are rare.    smoking increases that risk.   - redness, heat, swelling in the legs may represent a blood clot so call immediately  - Hormones can cause mood changes, headaches, and stomach upset.  These usually go down after a few months but if they don't we can change the pill to a different hormone pill.    MEDICATION INTERACTION  - some medication can interfere with oral contraceptive pills such as antibiotics. Use condoms during the month on antibiotics or other prescription medication that may decrease oral contraceptive pill  effectiveness          Return in about 1 year (around 11/17/2021), or if symptoms worsen or fail to improve.    Ha Velasco MD  [facility]    "

## 2020-11-18 ASSESSMENT — ANXIETY QUESTIONNAIRES: GAD7 TOTAL SCORE: 0

## 2020-12-02 DIAGNOSIS — Z79.899 ENCOUNTER FOR LONG-TERM (CURRENT) USE OF MEDICATIONS: Primary | ICD-10-CM

## 2020-12-02 LAB
BASOPHILS # BLD AUTO: 0 10E9/L (ref 0–0.2)
BASOPHILS NFR BLD AUTO: 0.1 %
DIFFERENTIAL METHOD BLD: NORMAL
EOSINOPHIL # BLD AUTO: 0.1 10E9/L (ref 0–0.7)
EOSINOPHIL NFR BLD AUTO: 1.8 %
ERYTHROCYTE [DISTWIDTH] IN BLOOD BY AUTOMATED COUNT: 13.8 % (ref 10–15)
HBA1C MFR BLD: 5.1 % (ref 0–5.6)
HCT VFR BLD AUTO: 40.5 % (ref 35–47)
HGB BLD-MCNC: 13.4 G/DL (ref 11.7–15.7)
LYMPHOCYTES # BLD AUTO: 1.4 10E9/L (ref 0.8–5.3)
LYMPHOCYTES NFR BLD AUTO: 20.5 %
MCH RBC QN AUTO: 30.2 PG (ref 26.5–33)
MCHC RBC AUTO-ENTMCNC: 33.1 G/DL (ref 31.5–36.5)
MCV RBC AUTO: 91 FL (ref 78–100)
MONOCYTES # BLD AUTO: 0.6 10E9/L (ref 0–1.3)
MONOCYTES NFR BLD AUTO: 8.6 %
NEUTROPHILS # BLD AUTO: 4.7 10E9/L (ref 1.6–8.3)
NEUTROPHILS NFR BLD AUTO: 69 %
PLATELET # BLD AUTO: 164 10E9/L (ref 150–450)
RBC # BLD AUTO: 4.43 10E12/L (ref 3.8–5.2)
WBC # BLD AUTO: 6.8 10E9/L (ref 4–11)

## 2020-12-02 PROCEDURE — 85025 COMPLETE CBC W/AUTO DIFF WBC: CPT | Performed by: PHYSICIAN ASSISTANT

## 2020-12-02 PROCEDURE — 36415 COLL VENOUS BLD VENIPUNCTURE: CPT | Performed by: PHYSICIAN ASSISTANT

## 2020-12-02 PROCEDURE — 83036 HEMOGLOBIN GLYCOSYLATED A1C: CPT | Performed by: PHYSICIAN ASSISTANT

## 2020-12-03 DIAGNOSIS — Z79.899 ENCOUNTER FOR LONG-TERM (CURRENT) USE OF MEDICATIONS: ICD-10-CM

## 2020-12-03 LAB
ALBUMIN SERPL-MCNC: 3.8 G/DL (ref 3.4–5)
ALP SERPL-CCNC: 83 U/L (ref 40–150)
ALT SERPL W P-5'-P-CCNC: 95 U/L (ref 0–50)
ANION GAP SERPL CALCULATED.3IONS-SCNC: 6 MMOL/L (ref 3–14)
AST SERPL W P-5'-P-CCNC: 39 U/L (ref 0–45)
BILIRUB SERPL-MCNC: 0.5 MG/DL (ref 0.2–1.3)
BUN SERPL-MCNC: 9 MG/DL (ref 7–30)
CALCIUM SERPL-MCNC: 8.7 MG/DL (ref 8.5–10.1)
CHLORIDE SERPL-SCNC: 108 MMOL/L (ref 94–109)
CHOLEST SERPL-MCNC: 184 MG/DL
CO2 SERPL-SCNC: 23 MMOL/L (ref 20–32)
CREAT SERPL-MCNC: 0.89 MG/DL (ref 0.52–1.04)
GFR SERPL CREATININE-BSD FRML MDRD: 89 ML/MIN/{1.73_M2}
GLUCOSE SERPL-MCNC: 130 MG/DL (ref 70–99)
HDLC SERPL-MCNC: 43 MG/DL
LDLC SERPL CALC-MCNC: 121 MG/DL
NONHDLC SERPL-MCNC: 141 MG/DL
POTASSIUM SERPL-SCNC: 3.9 MMOL/L (ref 3.4–5.3)
PROT SERPL-MCNC: 7.6 G/DL (ref 6.8–8.8)
SODIUM SERPL-SCNC: 137 MMOL/L (ref 133–144)
TRIGL SERPL-MCNC: 102 MG/DL

## 2020-12-03 PROCEDURE — 80061 LIPID PANEL: CPT | Performed by: PHYSICIAN ASSISTANT

## 2020-12-03 PROCEDURE — 36415 COLL VENOUS BLD VENIPUNCTURE: CPT | Performed by: PHYSICIAN ASSISTANT

## 2020-12-03 PROCEDURE — 80053 COMPREHEN METABOLIC PANEL: CPT | Performed by: PHYSICIAN ASSISTANT

## 2021-03-03 DIAGNOSIS — F84.0 AUTISTIC DISORDER, RESIDUAL STATE: Primary | ICD-10-CM

## 2021-03-03 DIAGNOSIS — F41.8 DEPRESSION WITH ANXIETY: ICD-10-CM

## 2021-03-03 DIAGNOSIS — F84.0 AUTISM SPECTRUM DISORDER: ICD-10-CM

## 2021-03-03 DIAGNOSIS — F41.1 GENERALIZED ANXIETY DISORDER: ICD-10-CM

## 2021-03-03 LAB
ALBUMIN SERPL-MCNC: 3.8 G/DL (ref 3.4–5)
ALP SERPL-CCNC: 80 U/L (ref 40–150)
ALT SERPL W P-5'-P-CCNC: 55 U/L (ref 0–50)
AST SERPL W P-5'-P-CCNC: 31 U/L (ref 0–45)
BILIRUB DIRECT SERPL-MCNC: 0.2 MG/DL (ref 0–0.2)
BILIRUB SERPL-MCNC: 0.9 MG/DL (ref 0.2–1.3)
CHOLEST SERPL-MCNC: 181 MG/DL
GLUCOSE SERPL-MCNC: 81 MG/DL (ref 70–99)
HBA1C MFR BLD: 5.2 % (ref 0–5.6)
HDLC SERPL-MCNC: 43 MG/DL
LDLC SERPL CALC-MCNC: 121 MG/DL
NONHDLC SERPL-MCNC: 138 MG/DL
PROT SERPL-MCNC: 8 G/DL (ref 6.8–8.8)
TRIGL SERPL-MCNC: 87 MG/DL

## 2021-03-03 PROCEDURE — 82947 ASSAY GLUCOSE BLOOD QUANT: CPT | Performed by: PHYSICIAN ASSISTANT

## 2021-03-03 PROCEDURE — 80076 HEPATIC FUNCTION PANEL: CPT | Performed by: PHYSICIAN ASSISTANT

## 2021-03-03 PROCEDURE — 83036 HEMOGLOBIN GLYCOSYLATED A1C: CPT | Performed by: PHYSICIAN ASSISTANT

## 2021-03-03 PROCEDURE — 36415 COLL VENOUS BLD VENIPUNCTURE: CPT | Performed by: PHYSICIAN ASSISTANT

## 2021-03-03 PROCEDURE — 80061 LIPID PANEL: CPT | Performed by: PHYSICIAN ASSISTANT

## 2021-05-24 ENCOUNTER — RECORDS - HEALTHEAST (OUTPATIENT)
Dept: ADMINISTRATIVE | Facility: CLINIC | Age: 26
End: 2021-05-24

## 2021-09-18 ENCOUNTER — HEALTH MAINTENANCE LETTER (OUTPATIENT)
Age: 26
End: 2021-09-18

## 2021-12-07 ENCOUNTER — TELEPHONE (OUTPATIENT)
Dept: FAMILY MEDICINE | Facility: CLINIC | Age: 26
End: 2021-12-07
Payer: COMMERCIAL

## 2021-12-07 DIAGNOSIS — N93.8 DUB (DYSFUNCTIONAL UTERINE BLEEDING): ICD-10-CM

## 2021-12-08 RX ORDER — NORGESTREL-ETHINYL ESTRADIOL 0.3-0.03MG
TABLET ORAL
Qty: 112 TABLET | Refills: 0 | Status: SHIPPED | OUTPATIENT
Start: 2021-12-08 | End: 2022-02-23

## 2021-12-08 NOTE — TELEPHONE ENCOUNTER
Message given to patients mother with good understanding.  Harika Gómez CSS on 12/8/2021 at 3:28 PM

## 2021-12-08 NOTE — TELEPHONE ENCOUNTER
"Requested Prescriptions   Pending Prescriptions Disp Refills     CRYSELLE-28 0.3-30 MG-MCG tablet [Pharmacy Med Name: CRYSELLE TABLETS 28] 112 tablet 4     Sig: TAKE ONE ACTIVE TABLET BY MOUTH DAILY CONTINUOUSLY FOR 3 MONTHS, THEN PLACEBO WEEK TO GET PERIOD EVERY 3 MONTHS.       Contraceptives Protocol Failed - 12/7/2021 10:13 AM        Failed - Recent (12 mo) or future (30 days) visit within the authorizing provider's specialty     Patient has had an office visit with the authorizing provider or a provider within the authorizing providers department within the previous 12 mos or has a future within next 30 days. See \"Patient Info\" tab in inbasket, or \"Choose Columns\" in Meds & Orders section of the refill encounter.              Passed - Patient is not a current smoker if age is 35 or older        Passed - Medication is active on med list        Passed - No active pregnancy on record        Passed - No positive pregnancy test in past 12 months           "

## 2022-01-02 ENCOUNTER — HEALTH MAINTENANCE LETTER (OUTPATIENT)
Age: 27
End: 2022-01-02

## 2022-02-23 ENCOUNTER — OFFICE VISIT (OUTPATIENT)
Dept: FAMILY MEDICINE | Facility: CLINIC | Age: 27
End: 2022-02-23
Payer: COMMERCIAL

## 2022-02-23 VITALS
HEIGHT: 63 IN | SYSTOLIC BLOOD PRESSURE: 126 MMHG | TEMPERATURE: 97.9 F | WEIGHT: 163.8 LBS | BODY MASS INDEX: 29.02 KG/M2 | DIASTOLIC BLOOD PRESSURE: 72 MMHG | OXYGEN SATURATION: 98 % | RESPIRATION RATE: 20 BRPM | HEART RATE: 87 BPM

## 2022-02-23 DIAGNOSIS — N93.8 DUB (DYSFUNCTIONAL UTERINE BLEEDING): ICD-10-CM

## 2022-02-23 DIAGNOSIS — Z11.59 NEED FOR HEPATITIS C SCREENING TEST: ICD-10-CM

## 2022-02-23 DIAGNOSIS — Z11.4 SCREENING FOR HIV (HUMAN IMMUNODEFICIENCY VIRUS): ICD-10-CM

## 2022-02-23 PROCEDURE — 99213 OFFICE O/P EST LOW 20 MIN: CPT | Performed by: FAMILY MEDICINE

## 2022-02-23 RX ORDER — NORGESTREL-ETHINYL ESTRADIOL 0.3-0.03MG
TABLET ORAL
Qty: 112 TABLET | Refills: 4 | Status: SHIPPED | OUTPATIENT
Start: 2022-02-23 | End: 2023-02-07

## 2022-02-23 ASSESSMENT — ANXIETY QUESTIONNAIRES
GAD7 TOTAL SCORE: 1
5. BEING SO RESTLESS THAT IT IS HARD TO SIT STILL: NOT AT ALL
3. WORRYING TOO MUCH ABOUT DIFFERENT THINGS: NOT AT ALL
2. NOT BEING ABLE TO STOP OR CONTROL WORRYING: NOT AT ALL
IF YOU CHECKED OFF ANY PROBLEMS ON THIS QUESTIONNAIRE, HOW DIFFICULT HAVE THESE PROBLEMS MADE IT FOR YOU TO DO YOUR WORK, TAKE CARE OF THINGS AT HOME, OR GET ALONG WITH OTHER PEOPLE: NOT DIFFICULT AT ALL
1. FEELING NERVOUS, ANXIOUS, OR ON EDGE: SEVERAL DAYS
6. BECOMING EASILY ANNOYED OR IRRITABLE: NOT AT ALL
7. FEELING AFRAID AS IF SOMETHING AWFUL MIGHT HAPPEN: NOT AT ALL

## 2022-02-23 ASSESSMENT — PATIENT HEALTH QUESTIONNAIRE - PHQ9
SUM OF ALL RESPONSES TO PHQ QUESTIONS 1-9: 2
5. POOR APPETITE OR OVEREATING: NOT AT ALL

## 2022-02-23 NOTE — PROGRESS NOTES
"  Assessment & Plan     DUB (dysfunctional uterine bleeding)  Stable, refill  - norgestrel-ethinyl estradiol (CRYSELLE-28) 0.3-30 MG-MCG tablet; TAKE ONE ACTIVE TABLET BY MOUTH DAILY CONTINUOUSLY.       BMI:   Estimated body mass index is 29.46 kg/m  as calculated from the following:    Height as of this encounter: 1.588 m (5' 2.52\").    Weight as of this encounter: 74.3 kg (163 lb 12.8 oz).   Weight management plan: Discussed healthy diet and exercise guidelines    See Patient Instructions    Return in about 1 year (around 2/23/2023) for Routine preventive, with me, in person.    Ha Velasco MD  [facility] 26 year [facility] who presents for the following health issues     History of Present Illness     Reason for visit:  Birth control yearly exam    She eats 0-1 servings of fruits and vegetables daily.She consumes 1 sweetened beverage(s) daily.She exercises with enough effort to increase her heart rate 9 or less minutes per day.  She exercises with enough effort to increase her heart rate 3 or less days per week.   She is taking medications regularly.       Medication Followup of Cryselle     Taking Medication as prescribed: yes    Side Effects:  None    Medication Helping Symptoms:  Yes, periods just few days of light spotting, no cramps.     No personal or family history of DVT or PE blood clots.  No migraine headaches.        Review of Systems   Constitutional, HEENT, cardiovascular, pulmonary, gi and gu systems are negative, except as otherwise noted.      Objective    /72   Pulse 87   Temp 97.9  F (36.6  C) (Tympanic)   Resp 20   Ht 1.588 m (5' 2.52\")   Wt 74.3 kg (163 lb 12.8 oz)   SpO2 98%   BMI 29.46 kg/m    Body mass index is 29.46 kg/m .  Physical Exam   GENERAL: healthy, alert and no distress  PSYCH: mentation appears normal, affect normal/bright            "

## 2022-02-24 ASSESSMENT — ANXIETY QUESTIONNAIRES: GAD7 TOTAL SCORE: 1

## 2022-03-30 ENCOUNTER — TELEPHONE (OUTPATIENT)
Dept: FAMILY MEDICINE | Facility: CLINIC | Age: 27
End: 2022-03-30
Payer: COMMERCIAL

## 2022-03-30 NOTE — TELEPHONE ENCOUNTER
Patient is scheduled for a covid pfizer booster ( #4) . I do not see this listed in her health maintenance.   I called and spoke with Mom whom said  Due to patient having SYNDROME V-Z VELO-CARDIO-FACIAL SYNDROME  She qualifies for the 4th .     I did speak with Dr. Velasco whom gave the verbal ok to administer. - Thank You - Macho BARBER CMA

## 2022-05-03 ENCOUNTER — LAB (OUTPATIENT)
Dept: LAB | Facility: CLINIC | Age: 27
End: 2022-05-03
Payer: COMMERCIAL

## 2022-05-03 DIAGNOSIS — Z79.899 ENCOUNTER FOR LONG-TERM (CURRENT) USE OF MEDICATIONS: Primary | ICD-10-CM

## 2022-05-03 DIAGNOSIS — Z11.4 SCREENING FOR HIV (HUMAN IMMUNODEFICIENCY VIRUS): ICD-10-CM

## 2022-05-03 DIAGNOSIS — Z11.59 NEED FOR HEPATITIS C SCREENING TEST: ICD-10-CM

## 2022-05-03 LAB
ALBUMIN SERPL-MCNC: 3.5 G/DL (ref 3.4–5)
ALP SERPL-CCNC: 98 U/L (ref 40–150)
ALT SERPL W P-5'-P-CCNC: 25 U/L (ref 0–50)
ANION GAP SERPL CALCULATED.3IONS-SCNC: 7 MMOL/L (ref 3–14)
AST SERPL W P-5'-P-CCNC: 19 U/L (ref 0–45)
BILIRUB SERPL-MCNC: 0.4 MG/DL (ref 0.2–1.3)
BUN SERPL-MCNC: 10 MG/DL (ref 7–30)
CALCIUM SERPL-MCNC: 8.1 MG/DL (ref 8.5–10.1)
CHLORIDE BLD-SCNC: 107 MMOL/L (ref 94–109)
CHOLEST SERPL-MCNC: 174 MG/DL
CO2 SERPL-SCNC: 25 MMOL/L (ref 20–32)
CREAT SERPL-MCNC: 0.76 MG/DL (ref 0.52–1.04)
ERYTHROCYTE [DISTWIDTH] IN BLOOD BY AUTOMATED COUNT: 12.6 % (ref 10–15)
FASTING STATUS PATIENT QL REPORTED: YES
FASTING STATUS PATIENT QL REPORTED: YES
GFR SERPL CREATININE-BSD FRML MDRD: >90 ML/MIN/1.73M2
GLUCOSE BLD-MCNC: 66 MG/DL (ref 70–99)
GLUCOSE BLD-MCNC: 66 MG/DL (ref 70–99)
HBA1C MFR BLD: 5.3 % (ref 0–5.6)
HCT VFR BLD AUTO: 41.1 % (ref 35–47)
HCV AB SERPL QL IA: NONREACTIVE
HDLC SERPL-MCNC: 44 MG/DL
HGB BLD-MCNC: 13.4 G/DL (ref 11.7–15.7)
HIV 1+2 AB+HIV1 P24 AG SERPL QL IA: NONREACTIVE
LDLC SERPL CALC-MCNC: 80 MG/DL
MCH RBC QN AUTO: 31 PG (ref 26.5–33)
MCHC RBC AUTO-ENTMCNC: 32.6 G/DL (ref 31.5–36.5)
MCV RBC AUTO: 95 FL (ref 78–100)
NONHDLC SERPL-MCNC: 130 MG/DL
PLATELET # BLD AUTO: 167 10E3/UL (ref 150–450)
POTASSIUM BLD-SCNC: 3.7 MMOL/L (ref 3.4–5.3)
PROT SERPL-MCNC: 7.4 G/DL (ref 6.8–8.8)
RBC # BLD AUTO: 4.32 10E6/UL (ref 3.8–5.2)
SODIUM SERPL-SCNC: 139 MMOL/L (ref 133–144)
TRIGL SERPL-MCNC: 248 MG/DL
WBC # BLD AUTO: 7.3 10E3/UL (ref 4–11)

## 2022-05-03 PROCEDURE — 85027 COMPLETE CBC AUTOMATED: CPT

## 2022-05-03 PROCEDURE — 80061 LIPID PANEL: CPT

## 2022-05-03 PROCEDURE — 86803 HEPATITIS C AB TEST: CPT

## 2022-05-03 PROCEDURE — 36415 COLL VENOUS BLD VENIPUNCTURE: CPT

## 2022-05-03 PROCEDURE — 80053 COMPREHEN METABOLIC PANEL: CPT

## 2022-05-03 PROCEDURE — 83036 HEMOGLOBIN GLYCOSYLATED A1C: CPT

## 2022-05-03 PROCEDURE — 87389 HIV-1 AG W/HIV-1&-2 AB AG IA: CPT

## 2022-08-02 NOTE — ED NOTES
Patient  Given her own medication  Right deltoid. Patient  Given instructions to infusion clinic    Lot # [id number]   Exp     No problems with injections    
Statement Selected

## 2022-10-14 ENCOUNTER — APPOINTMENT (OUTPATIENT)
Dept: ULTRASOUND IMAGING | Facility: CLINIC | Age: 27
End: 2022-10-14
Attending: NURSE PRACTITIONER
Payer: COMMERCIAL

## 2022-10-14 ENCOUNTER — HOSPITAL ENCOUNTER (EMERGENCY)
Facility: CLINIC | Age: 27
Discharge: HOME | End: 2022-10-14
Attending: FAMILY MEDICINE | Admitting: FAMILY MEDICINE
Payer: COMMERCIAL

## 2022-10-14 VITALS
RESPIRATION RATE: 19 BRPM | OXYGEN SATURATION: 95 % | WEIGHT: 155 LBS | HEART RATE: 84 BPM | TEMPERATURE: 98.5 F | DIASTOLIC BLOOD PRESSURE: 61 MMHG | SYSTOLIC BLOOD PRESSURE: 108 MMHG | BODY MASS INDEX: 28.52 KG/M2 | HEIGHT: 62 IN

## 2022-10-14 DIAGNOSIS — K80.50 BILIARY COLIC: ICD-10-CM

## 2022-10-14 LAB
ALBUMIN SERPL BCG-MCNC: 4.4 G/DL (ref 3.5–5.2)
ALBUMIN UR-MCNC: NEGATIVE MG/DL
ALP SERPL-CCNC: 82 U/L (ref 35–104)
ALT SERPL W P-5'-P-CCNC: 23 U/L (ref 10–35)
ANION GAP SERPL CALCULATED.3IONS-SCNC: 14 MMOL/L (ref 7–15)
APPEARANCE UR: CLEAR
AST SERPL W P-5'-P-CCNC: 20 U/L (ref 10–35)
BASOPHILS # BLD AUTO: 0 10E3/UL (ref 0–0.2)
BASOPHILS NFR BLD AUTO: 0 %
BILIRUB SERPL-MCNC: 0.5 MG/DL
BILIRUB UR QL STRIP: NEGATIVE
BUN SERPL-MCNC: 11.6 MG/DL (ref 6–20)
CALCIUM SERPL-MCNC: 9.1 MG/DL (ref 8.6–10)
CHLORIDE SERPL-SCNC: 101 MMOL/L (ref 98–107)
COLOR UR AUTO: YELLOW
CREAT SERPL-MCNC: 0.76 MG/DL (ref 0.51–0.95)
DEPRECATED HCO3 PLAS-SCNC: 21 MMOL/L (ref 22–29)
EOSINOPHIL # BLD AUTO: 0.1 10E3/UL (ref 0–0.7)
EOSINOPHIL NFR BLD AUTO: 1 %
ERYTHROCYTE [DISTWIDTH] IN BLOOD BY AUTOMATED COUNT: 12.9 % (ref 10–15)
GFR SERPL CREATININE-BSD FRML MDRD: >90 ML/MIN/1.73M2
GLUCOSE SERPL-MCNC: 102 MG/DL (ref 70–99)
GLUCOSE UR STRIP-MCNC: NEGATIVE MG/DL
HCT VFR BLD AUTO: 42.8 % (ref 35–47)
HGB BLD-MCNC: 14.2 G/DL (ref 11.7–15.7)
HGB UR QL STRIP: NEGATIVE
IMM GRANULOCYTES # BLD: 0 10E3/UL
IMM GRANULOCYTES NFR BLD: 0 %
KETONES UR STRIP-MCNC: NEGATIVE MG/DL
LEUKOCYTE ESTERASE UR QL STRIP: NEGATIVE
LYMPHOCYTES # BLD AUTO: 1.3 10E3/UL (ref 0.8–5.3)
LYMPHOCYTES NFR BLD AUTO: 10 %
MCH RBC QN AUTO: 30.6 PG (ref 26.5–33)
MCHC RBC AUTO-ENTMCNC: 33.2 G/DL (ref 31.5–36.5)
MCV RBC AUTO: 92 FL (ref 78–100)
MONOCYTES # BLD AUTO: 1 10E3/UL (ref 0–1.3)
MONOCYTES NFR BLD AUTO: 7 %
NEUTROPHILS # BLD AUTO: 11.1 10E3/UL (ref 1.6–8.3)
NEUTROPHILS NFR BLD AUTO: 82 %
NITRATE UR QL: NEGATIVE
NRBC # BLD AUTO: 0 10E3/UL
NRBC BLD AUTO-RTO: 0 /100
PH UR STRIP: 6 [PH] (ref 5–7)
PLATELET # BLD AUTO: 161 10E3/UL (ref 150–450)
POTASSIUM SERPL-SCNC: 3.8 MMOL/L (ref 3.4–5.3)
PROT SERPL-MCNC: 8 G/DL (ref 6.4–8.3)
RBC # BLD AUTO: 4.64 10E6/UL (ref 3.8–5.2)
SODIUM SERPL-SCNC: 136 MMOL/L (ref 136–145)
SP GR UR STRIP: 1.02 (ref 1–1.03)
UROBILINOGEN UR STRIP-MCNC: 2 MG/DL
WBC # BLD AUTO: 13.5 10E3/UL (ref 4–11)

## 2022-10-14 PROCEDURE — 81003 URINALYSIS AUTO W/O SCOPE: CPT | Performed by: FAMILY MEDICINE

## 2022-10-14 PROCEDURE — 76705 ECHO EXAM OF ABDOMEN: CPT

## 2022-10-14 PROCEDURE — 85025 COMPLETE CBC W/AUTO DIFF WBC: CPT | Performed by: NURSE PRACTITIONER

## 2022-10-14 PROCEDURE — 99282 EMERGENCY DEPT VISIT SF MDM: CPT | Performed by: NURSE PRACTITIONER

## 2022-10-14 PROCEDURE — 99284 EMERGENCY DEPT VISIT MOD MDM: CPT | Mod: 25 | Performed by: NURSE PRACTITIONER

## 2022-10-14 PROCEDURE — 80053 COMPREHEN METABOLIC PANEL: CPT | Performed by: NURSE PRACTITIONER

## 2022-10-14 PROCEDURE — 81003 URINALYSIS AUTO W/O SCOPE: CPT | Performed by: NURSE PRACTITIONER

## 2022-10-14 PROCEDURE — 36415 COLL VENOUS BLD VENIPUNCTURE: CPT | Performed by: NURSE PRACTITIONER

## 2022-10-14 ASSESSMENT — ACTIVITIES OF DAILY LIVING (ADL)
ADLS_ACUITY_SCORE: 35
ADLS_ACUITY_SCORE: 35

## 2022-10-15 NOTE — ED PROVIDER NOTES
History     Chief Complaint   Patient presents with     Rib Pain     Right sided rib pain     HPI  Camilla Henao is a 27 year old female who presents with right sided abdminal/flank pain.  Pt reports onset of symptoms one week ago.  Pt reports chronic pain with intermittent acute exacerbations.  Pt rates pain 04/10 at the low side and 08/10 with acute exacerbations.  Patient denies dysuria, hematuria, bright red rectal bleeding, diarrhea, constipation, chest pain, shortness of breath, fever, aches, chills, sweats.  Patient denies any recent trauma or falls to the right side.  Patient is treated with ibuprofen without improvement in symptoms.  Patient denies history of ureteral lithiasis, cholecystitis, appendicitis.    Allergies:  No Known Allergies    Problem List:    Patient Active Problem List    Diagnosis Date Noted     Pulmonary valve insufficiency, unspecified etiology 08/09/2018     Priority: Medium     Other schizophrenia (H) 11/12/2017     Priority: Medium     Diagnosed at age 17.  History of hearing voices (voice would tell her to hurt other people).  History of cutting.    Seeing Hector at [facility].       Gender dysphoria in adolescent and adult 08/30/2017     Priority: Medium     Vitamin D deficiency 01/12/2017     Priority: Medium     Started on 5000 units daily in early 2017, plan recheck in spring and reduction to maintenance daily dose.       Abnormal finding on MRI of brain 07/25/2016     Priority: Medium     MENTAL HEALTH 03/10/2016     Priority: Medium     Receiving behavioral health services at [facility]; therapist is Paola York PsyD  [phone]       Parent-child relational problem 03/10/2016     Priority: Medium     Sibling relational problem 03/10/2016     Priority: Medium     Borderline intellectual functioning 03/10/2016     Priority: Medium     Major depressive disorder, single episode, moderate (H) 04/24/2013     Priority: Medium      "Learning disorder 04/08/2013     Priority: Medium     Adjustment disorder with mixed disturbance of emotions and conduct 04/06/2013     Priority: Medium     CARDIOVASCULAR SCREENING; LDL GOAL LESS THAN 130 02/28/2013     Priority: Medium     Other specific developmental learning difficulties 11/25/2008     Priority: Medium     See results of neuropsychological testing and recommendations from 10- (see \"other orders\" referral attachment)       SYNDROME V-Z VELO-CARDIO-FACIAL SYNDROME 04/10/2006     Priority: Medium     Tetralogy of Fallot 04/10/2006     Priority: Medium     S/P VSD closure and RV outflow patch augmentation 1/11/95  Echocardiogram 4/1/2008: excellent ventricular function, mild RV enlargement, moderate to severe pulmonary insufficiency, trivial pulmonary stenosis  Followed by Dr. Arnaldo Toledo at [facility] -- no restrictions, off SBE prophylaxis due to new recommendations, plan recheck in one year with MRI prior to visit  2009 MRI[facility]          Past Medical History:    Past Medical History:   Diagnosis Date     * * * SBE PROPHYLAXIS * * * 4/10/2006     Depressive disorder 2012     History of blood transfusion      Other symbolic dysfunction(784.69)      Tetralogy of Fallot        Past Surgical History:    Past Surgical History:   Procedure Laterality Date     CARDIAC SURGERY      As a child. No restrictions     ENT SURGERY      palate repair when young     SURGICAL HISTORY OF -   01/1995    Repair of Tetralogy       Family History:    Family History   Problem Relation Age of Onset     Hyperlipidemia Paternal Grandmother      Hyperlipidemia Paternal Grandfather      Depression Other 15        paternal cousin     Depression Other 15        paternal cousin     Depression Other 15        paternal cousin     Diabetes Maternal Great-Grandmother        Social History:  Marital Status:  Single [1]  Social History     Tobacco Use     " Smoking status: Never     Smokeless tobacco: Never   Vaping Use     Vaping Use: Never used   Substance Use Topics     Alcohol use: No     Drug use: No        Medications:    ARIPIPRAZOLE PO  FLUOXETINE HCL PO  HYDROXYZINE HCL PO  norgestrel-ethinyl estradiol (CRYSELLE-28) 0.3-30 MG-MCG tablet          Review of Systems  As mentioned above in the history present illness. All other systems were reviewed and are negative.    Physical Exam   BP: 134/81  Pulse: 96  Temp: 98.5  F (36.9  C)  Resp: 19  SpO2: 95 %      Physical Exam  Vitals and nursing note reviewed.   Constitutional:       General: She is not in acute distress.     Appearance: She is well-developed and well-nourished. She is not diaphoretic.   HENT:      Head: Normocephalic and atraumatic.      Right Ear: Hearing, tympanic membrane, ear canal and external ear normal.      Left Ear: Hearing, tympanic membrane, ear canal and external ear normal.      Nose: Nose normal.      Mouth/Throat:      Mouth: Oropharynx is clear and moist and mucous membranes are normal.      Pharynx: Uvula midline.      Tonsils: No tonsillar exudate.   Eyes:      General: No scleral icterus.        Right eye: No discharge.         Left eye: No discharge.      Conjunctiva/sclera: Conjunctivae normal.   Cardiovascular:      Rate and Rhythm: Normal rate and regular rhythm.      Heart sounds: Normal heart sounds. No murmur heard.    No friction rub.   Pulmonary:      Effort: Pulmonary effort is normal. No respiratory distress.      Breath sounds: Normal breath sounds. No stridor. No wheezing or rales.   Chest:      Chest wall: No tenderness.   Abdominal:      General: Bowel sounds are normal. There is no distension.      Palpations: Abdomen is soft. There is no mass.      Tenderness: There is abdominal tenderness in the right upper quadrant. There is no guarding or rebound. Positive signs include Oleary's sign.      Hernia: No hernia is present.       Musculoskeletal:      Cervical back:  Normal range of motion and neck supple.   Skin:     General: Skin is warm and dry.      Coloration: Skin is not pale.      Findings: No erythema or rash.   Neurological:      Mental Status: She is alert and oriented to person, place, and time.      Deep Tendon Reflexes: Reflexes normal.   Psychiatric:         Mood and Affect: Mood and affect normal.         ED Course                 Procedures    No results found for this or any previous visit (from the past 24 hour(s)).    Medications - No data to display    Assessments & Plan (with Medical Decision Making)     I have reviewed the nursing notes.    I have reviewed the findings, diagnosis, plan and need for follow up with the patient.    27-year-old female presents to the urgent care with 1 week history of intermittent abdominal pain right flank pain.  Patient reporting chronic aching and intermittent exacerbations of acute sharp pain.  Patient rates the pain 4 out of 10 chronically and 8 out of 10 with acute exacerbations.  Patient unable to tell me if there is any precipitating factors for this pain.  Patient denies any blunt force trauma or falls recently.  Patient denies chest pain or shortness of breath or difficulty breathing, hematuria, dysuria, diarrhea, constipation.  Patient reports normal bowel movements.  Patient denies fever, chills, sweats.  On exam patient has clear lung sounds, normal bowel sounds, right upper quadrant abdominal pain and positive Oleary sign.  Differential diagnosis cholecystitis, biliary colic, acute abdomen, bowel obstruction, ureteral lithiasis.  Discussed all these possibilities with dad.  Initially he preferred to just proceed with a urinalysis and subsequently requested further evaluation and we will move patient over to the emergency room.  New Prescriptions    No medications on file       Final diagnoses:   Abdominal pain, right upper quadrant       10/14/2022   [facility] EMERGENCY DEPT     Shelby Solano  LOU Perla CNP  10/14/22 [id number]

## 2022-10-15 NOTE — ED PROVIDER NOTES
History     Chief Complaint   Patient presents with     Rib Pain     Right sided rib pain     HPI  Camilla Henao is a 27 year old female, past medical history is significant for gender dysphoria, vitamin D deficiency, borderline intellectual functioning, depression, learning disorder, Tetralogy of Fallot, presents to the emergency department concerns of right-sided abdominal/chest pain.  This patient was originally seen in the urgent care and sent over to the emergency department when further evaluation was felt appropriate.  History is obtained from the patient with her father present.  She states that she has had intermittent episodes of right upper quadrant abdominal pain not consistently postprandial, very variable in duration almost daily for the bout the last week for the most part sharp with occasional dull aching component.  Loss of appetite nausea.  Not brought on by exertion.  Denies shortness of breath palpitations lightheadedness.  No fever chills or sweats.  No recent change in bowel habits specifically no constipation or diarrhea.  Pain-free at the time of assessment.  Allergies:  No Known Allergies    Problem List:    Patient Active Problem List    Diagnosis Date Noted     Pulmonary valve insufficiency, unspecified etiology 08/09/2018     Priority: Medium     Other schizophrenia (H) 11/12/2017     Priority: Medium     Diagnosed at age 17.  History of hearing voices (voice would tell her to hurt other people).  History of cutting.    Seeing Hector at [facility].       Gender dysphoria in adolescent and adult 08/30/2017     Priority: Medium     Vitamin D deficiency 01/12/2017     Priority: Medium     Started on 5000 units daily in early 2017, plan recheck in spring and reduction to maintenance daily dose.       Abnormal finding on MRI of brain 07/25/2016     Priority: Medium     MENTAL HEALTH 03/10/2016     Priority: Medium     Receiving behavioral health services at [facility], [facility]  [facility]; therapist is Hiltonfidel York PsyD  [phone]       Parent-child relational problem 03/10/2016     Priority: Medium     Sibling relational problem 03/10/2016     Priority: Medium     Borderline intellectual functioning 03/10/2016     Priority: Medium     Major depressive disorder, single episode, moderate (H) 04/24/2013     Priority: Medium     Learning disorder 04/08/2013     Priority: Medium     Adjustment disorder with mixed disturbance of emotions and conduct 04/06/2013     Priority: Medium     CARDIOVASCULAR SCREENING; LDL GOAL LESS THAN 130 02/28/2013     Priority: Medium     Other specific developmental learning difficulties 11/25/2008     Priority: Medium     See results of neuropsychological testing and recommendations from 10- (see \"other orders\" referral attachment)       SYNDROME V-Z VELO-CARDIO-FACIAL SYNDROME 04/10/2006     Priority: Medium     Tetralogy of Fallot 04/10/2006     Priority: Medium     S/P VSD closure and RV outflow patch augmentation 1/11/95  Echocardiogram 4/1/2008: excellent ventricular function, mild RV enlargement, moderate to severe pulmonary insufficiency, trivial pulmonary stenosis  Followed by Dr. Arnaldo Toledo at [facility] -- no restrictions, off SBE prophylaxis due to new recommendations, plan recheck in one year with MRI prior to visit  2009 MRI[facility]          Past Medical History:    Past Medical History:   Diagnosis Date     * * * SBE PROPHYLAXIS * * * 4/10/2006     Depressive disorder 2012     History of blood transfusion      Other symbolic dysfunction(254.80)      Tetralogy of Fallot        Past Surgical History:    Past Surgical History:   Procedure Laterality Date     CARDIAC SURGERY      As a child. No restrictions     ENT SURGERY      palate repair when young     SURGICAL HISTORY OF -   01/1995    Repair of Tetralogy       Family History:    Family History   Problem " "Relation Age of Onset     Hyperlipidemia Paternal Grandmother      Hyperlipidemia Paternal Grandfather      Depression Other 15        paternal cousin     Depression Other 15        paternal cousin     Depression Other 15        paternal cousin     Diabetes Maternal Great-Grandmother        Social History:  Marital Status:  Single [1]  Social History     Tobacco Use     Smoking status: Never     Smokeless tobacco: Never   Vaping Use     Vaping Use: Never used   Substance Use Topics     Alcohol use: No     Drug use: No        Medications:    ARIPIPRAZOLE PO  FLUOXETINE HCL PO  HYDROXYZINE HCL PO  norgestrel-ethinyl estradiol (CRYSELLE-28) 0.3-30 MG-MCG tablet          Review of Systems   All other systems reviewed and are negative.      Physical Exam   BP: 134/81  Pulse: 96  Temp: 98.5  F (36.9  C)  Resp: 19  Height: 157.5 cm (5' 2\")  Weight: 70.3 kg (155 lb)  SpO2: 95 %      Physical Exam  Vitals and nursing note reviewed.   Constitutional:       General: She is not in acute distress.     Appearance: Normal appearance. She is normal weight. She is not ill-appearing.   HENT:      Head: Normocephalic and atraumatic.      Right Ear: Tympanic membrane, ear canal and external ear normal.      Left Ear: Tympanic membrane, ear canal and external ear normal.      Nose: Nose normal.      Mouth/Throat:      Mouth: Mucous membranes are dry.      Pharynx: Oropharynx is clear.   Eyes:      Extraocular Movements: Extraocular movements intact.      Conjunctiva/sclera: Conjunctivae normal.      Pupils: Pupils are equal, round, and reactive to light.   Cardiovascular:      Rate and Rhythm: Normal rate and regular rhythm.      Pulses: Normal pulses.      Heart sounds: Normal heart sounds.   Pulmonary:      Effort: Pulmonary effort is normal.      Breath sounds: Normal breath sounds.   Abdominal:      General: Bowel sounds are normal.      Palpations: Abdomen is soft.   Musculoskeletal:         General: Normal range of motion.      " Cervical back: Normal range of motion and neck supple.   Skin:     General: Skin is warm and dry.      Capillary Refill: Capillary refill takes less than 2 seconds.   Neurological:      General: No focal deficit present.      Mental Status: She is alert and oriented to person, place, and time.   Psychiatric:         Mood and Affect: Mood normal.         Behavior: Behavior normal.         ED Course                 Procedures              Critical Care time:  none               Results for orders placed or performed during the hospital encounter of 10/14/22 (from the past 24 hour(s))   UA reflex to Microscopic and Culture    Specimen: Urine, Clean Catch   Result Value Ref Range    Color Urine Yellow Colorless, Straw, Light Yellow, Yellow    Appearance Urine Clear Clear    Glucose Urine Negative Negative mg/dL    Bilirubin Urine Negative Negative    Ketones Urine Negative Negative mg/dL    Specific Gravity Urine 1.025 1.003 - 1.035    Blood Urine Negative Negative    pH Urine 6.0 5.0 - 7.0    Protein Albumin Urine Negative Negative mg/dL    Urobilinogen Urine 2.0 Normal, 2.0 mg/dL    Nitrite Urine Negative Negative    Leukocyte Esterase Urine Negative Negative    Narrative    Microscopic not indicated   CBC with platelets differential    Narrative    The following orders were created for panel order CBC with platelets differential.  Procedure                               Abnormality         Status                     ---------                               -----------         ------                     CBC with platelets and d...[[id number]]  Abnormal            Final result                 Please view results for these tests on the individual orders.   Comprehensive metabolic panel   Result Value Ref Range    Sodium 136 136 - 145 mmol/L    Potassium 3.8 3.4 - 5.3 mmol/L    Chloride 101 98 - 107 mmol/L    Carbon Dioxide (CO2) 21 (L) 22 - 29 mmol/L    Anion Gap 14 7 - 15 mmol/L    Urea Nitrogen 11.6 6.0 - 20.0 mg/dL     Creatinine 0.76 0.51 - 0.95 mg/dL    Calcium 9.1 8.6 - 10.0 mg/dL    Glucose 102 (H) 70 - 99 mg/dL    Alkaline Phosphatase 82 35 - 104 U/L    AST 20 10 - 35 U/L    ALT 23 10 - 35 U/L    Protein Total 8.0 6.4 - 8.3 g/dL    Albumin 4.4 3.5 - 5.2 g/dL    Bilirubin Total 0.5 <=1.2 mg/dL    GFR Estimate >90 >60 mL/min/1.73m2   CBC with platelets and differential   Result Value Ref Range    WBC Count 13.5 (H) 4.0 - 11.0 10e3/uL    RBC Count 4.64 3.80 - 5.20 10e6/uL    Hemoglobin 14.2 11.7 - 15.7 g/dL    Hematocrit 42.8 35.0 - 47.0 %    MCV 92 78 - 100 fL    MCH 30.6 26.5 - 33.0 pg    MCHC 33.2 31.5 - 36.5 g/dL    RDW 12.9 10.0 - 15.0 %    Platelet Count 161 150 - 450 10e3/uL    % Neutrophils 82 %    % Lymphocytes 10 %    % Monocytes 7 %    % Eosinophils 1 %    % Basophils 0 %    % Immature Granulocytes 0 %    NRBCs per 100 WBC 0 <1 /100    Absolute Neutrophils 11.1 (H) 1.6 - 8.3 10e3/uL    Absolute Lymphocytes 1.3 0.8 - 5.3 10e3/uL    Absolute Monocytes 1.0 0.0 - 1.3 10e3/uL    Absolute Eosinophils 0.1 0.0 - 0.7 10e3/uL    Absolute Basophils 0.0 0.0 - 0.2 10e3/uL    Absolute Immature Granulocytes 0.0 <=0.4 10e3/uL    Absolute NRBCs 0.0 10e3/uL   US Abdomen Limited    Narrative    EXAM: US ABDOMEN LIMITED  LOCATION: [facility]  DATE/TIME: 10/14/2022 7:58 PM    INDICATION: Rule out cholecystitis.  COMPARISON: None.  FINDINGS:    GALLBLADDER: There is shadowing echogenic material in the region of the gallbladder, possibly representing a gallbladder filled with stones. The gallbladder wall is not well seen. No pericholecystic fluid is identified. The sonographer reports a negative   sonographic Oleary sign.     BILE DUCTS: There is no biliary dilatation. The common duct measures 2 mm. Portions of the common duct could not be visualized due to overlying bowel gas.    LIVER: The liver demonstrates normal echogenicity and echotexture. Hepatic contour is smooth. No focal mass.    RIGHT KIDNEY: The  kidney demonstrates normal echogenicity with no hydronephrosis. Normal size.     PANCREAS: The visualized portions of the pancreas are normal.    No ascites is seen.      Impression    IMPRESSION:  1. Shadowing echogenic material in the right upper quadrant possibly represents a gallbladder filled with stones. The gallbladder wall is not well seen. Acute cholecystitis is difficult to exclude on the basis of this scan. Please note, if the patient   has had a prior cholecystectomy, bowel within the gallbladder fossa could also have this appearance.  2. No other cause for abdominal pain is identified. No biliary dilatation.       9:14 PM  Reviewed results in the room with the patient and her father.  As noted in my HPI the patient is pain-free and has been for considerable period of time.  We extensively discussed appropriate diet in the current context and avoidance of animal protein and appropriate foods to avoid precipitating potentially repeat episodes of biliary colic and potentially acute cholecystitis.  The patient is currently asymptomatic.  I have given her her and her father contact information for general surgery and asked them to follow-up in clinic.  If she has problems in the interim with flareups of pain consistent with her current presentation she should return to the emergency department if not responsive to simple pain medicine such as acetaminophen.        Medications - No data to display    Assessments & Plan (with Medical Decision Making)   Assessment and plan with medical decision making at the time stamp above.      Disclaimer: This note consists of symbols derived from keyboarding, dictation and/or voice recognition software. As a result, there may be errors in the script that have gone undetected. Please consider this when interpreting information found in this chart.      I have reviewed the nursing notes.    I have reviewed the findings, diagnosis, plan and need for follow up with the  patient.       New Prescriptions    No medications on file       Final diagnoses:   Biliary colic       10/14/2022   [facility] EMERGENCY DEPT     Brandt Figueroa MD  10/14/22 [id number]

## 2022-10-15 NOTE — DISCHARGE INSTRUCTIONS
Dietary restrictions as we discussed.  Appointment as outpatient with general surgery.  You will have to call for an appointment within the next 5 to 7 days.  If you have recurrent attacks despite following dietary recommendations you may try Tylenol for pain.  If this is not successful and the pain is severe you should return to the emergency department.

## 2022-10-17 ENCOUNTER — PATIENT OUTREACH (OUTPATIENT)
Dept: FAMILY MEDICINE | Facility: CLINIC | Age: 27
End: 2022-10-17

## 2022-10-17 NOTE — TELEPHONE ENCOUNTER
ED / Discharge Outreach Protocol    Patient Contact    Attempt # 1    Was call answered?  No.  Left message with mother for patient to return call to clinic.    Kathy Rangel RN

## 2022-10-27 ENCOUNTER — VIRTUAL VISIT (OUTPATIENT)
Dept: SURGERY | Facility: CLINIC | Age: 27
End: 2022-10-27
Payer: COMMERCIAL

## 2022-10-27 DIAGNOSIS — K80.20 SYMPTOMATIC CHOLELITHIASIS: Primary | ICD-10-CM

## 2022-10-27 PROCEDURE — 99204 OFFICE O/P NEW MOD 45 MIN: CPT | Mod: TEL | Performed by: SURGERY

## 2022-10-27 NOTE — NURSING NOTE
"Initial There were no vitals taken for this visit. Estimated body mass index is 28.35 kg/m  as calculated from the following:    Height as of 10/14/22: 1.575 m (5' 2\").    Weight as of 10/14/22: 70.3 kg (155 lb). .    Dorina Hayden MA    "

## 2022-10-27 NOTE — LETTER
10/27/2022         RE: Camilla Henao  [address]  [facility] [address]        Dear Colleague,    Thank you for referring your patient, Camilla Henao, to the [facility]. Please see a copy of my visit note below.    Camilla is a 27 year old who is being evaluated via a billable telephone visit.      What phone number would you like to be contacted at? [phone]  How would you like to obtain your AVS? MyChart      Phone call duration: 14 minutes      Surgical Consultation/History and Physical  [facility] General Surgery    Camilla is seen in consultation for Abdominal pain, at the request of Ha Velasco MD.    Chief Complaint:  Abdominal pain    History of Present Illness: Camilla Henao is a 27 year old female presents with abdominal pain.  She admits right flank pain which has since resolved.  She was seen in ED and had work-up.  She has modified her diet.  Denies acholic stools or melena.  No history of pancreatitis.      She has a history of cardiac surgery for tetralogy of fallot.      Patient Active Problem List   Diagnosis     SYNDROME V-Z VELO-CARDIO-FACIAL SYNDROME     Tetralogy of Fallot     Other specific developmental learning difficulties     CARDIOVASCULAR SCREENING; LDL GOAL LESS THAN 130     Adjustment disorder with mixed disturbance of emotions and conduct     Learning disorder     MENTAL HEALTH     Major depressive disorder, single episode, moderate (H)     Parent-child relational problem     Sibling relational problem     Borderline intellectual functioning     Abnormal finding on MRI of brain     Vitamin D deficiency     Gender dysphoria in adolescent and adult     Other schizophrenia (H)     Pulmonary valve insufficiency, unspecified etiology     Past Medical History:   Diagnosis Date     * * * SBE PROPHYLAXIS * * * 4/10/2006    Discontinued 2008 due to updated AHA recommendations     Depressive disorder 2012     History of blood transfusion      I think she had one for her heart surgery 1/1995     Other symbolic dysfunction(784.69)     verbal apraxia     Tetralogy of Fallot      Past Surgical History:   Procedure Laterality Date     CARDIAC SURGERY      As a child. No restrictions     ENT SURGERY      palate repair when young     SURGICAL HISTORY OF -   01/1995    Repair of Tetralogy     Family History   Problem Relation Age of Onset     Hyperlipidemia Paternal Grandmother      Hyperlipidemia Paternal Grandfather      Depression Other 15        paternal cousin     Depression Other 15        paternal cousin     Depression Other 15        paternal cousin     Diabetes Maternal Great-Grandmother      Social History     Tobacco Use     Smoking status: Never     Smokeless tobacco: Never   Substance Use Topics     Alcohol use: No      History   Drug Use No     Current Outpatient Medications   Medication Sig Dispense Refill     ARIPIPRAZOLE PO Take 5 mg by mouth daily       FLUOXETINE HCL PO Take 40 mg by mouth daily       HYDROXYZINE HCL PO Take 25 mg by mouth 2 tablets up to 3 times per day as needed       norgestrel-ethinyl estradiol (CRYSELLE-28) 0.3-30 MG-MCG tablet TAKE ONE ACTIVE TABLET BY MOUTH DAILY CONTINUOUSLY. 112 tablet 4     No Known Allergies    Review of Systems:   10 point ROS otherwise negative    Lab Results   Component Value Date    WBC 13.5 10/14/2022    WBC 6.8 12/02/2020     Lab Results   Component Value Date    RBC 4.64 10/14/2022    RBC 4.43 12/02/2020     Lab Results   Component Value Date    HGB 14.2 10/14/2022    HGB 13.4 12/02/2020     Lab Results   Component Value Date    HCT 42.8 10/14/2022    HCT 40.5 12/02/2020     Lab Results   Component Value Date    MCV 92 10/14/2022    MCV 91 12/02/2020     Lab Results   Component Value Date    MCH 30.6 10/14/2022    MCH 30.2 12/02/2020     Lab Results   Component Value Date    MCHC 33.2 10/14/2022    MCHC 33.1 12/02/2020     Lab Results   Component Value Date    RDW 12.9 10/14/2022    RDW  13.8 12/02/2020     Lab Results   Component Value Date     10/14/2022     12/02/2020     Last Comprehensive Metabolic Panel:  Sodium   Date Value Ref Range Status   10/14/2022 136 136 - 145 mmol/L Final   12/03/2020 137 133 - 144 mmol/L Final     Potassium   Date Value Ref Range Status   10/14/2022 3.8 3.4 - 5.3 mmol/L Final   05/03/2022 3.7 3.4 - 5.3 mmol/L Final   12/03/2020 3.9 3.4 - 5.3 mmol/L Final     Chloride   Date Value Ref Range Status   10/14/2022 101 98 - 107 mmol/L Final   05/03/2022 107 94 - 109 mmol/L Final   12/03/2020 108 94 - 109 mmol/L Final     Carbon Dioxide   Date Value Ref Range Status   12/03/2020 23 20 - 32 mmol/L Final     Carbon Dioxide (CO2)   Date Value Ref Range Status   10/14/2022 21 (L) 22 - 29 mmol/L Final   05/03/2022 25 20 - 32 mmol/L Final     Anion Gap   Date Value Ref Range Status   10/14/2022 14 7 - 15 mmol/L Final   05/03/2022 7 3 - 14 mmol/L Final   12/03/2020 6 3 - 14 mmol/L Final     Glucose   Date Value Ref Range Status   10/14/2022 102 (H) 70 - 99 mg/dL Final   05/03/2022 66 (L) 70 - 99 mg/dL Final   05/03/2022 66 (L) 70 - 99 mg/dL Final   03/03/2021 81 70 - 99 mg/dL Final     Urea Nitrogen   Date Value Ref Range Status   10/14/2022 11.6 6.0 - 20.0 mg/dL Final   05/03/2022 10 7 - 30 mg/dL Final   12/03/2020 9 7 - 30 mg/dL Final     Creatinine   Date Value Ref Range Status   10/14/2022 0.76 0.51 - 0.95 mg/dL Final   12/03/2020 0.89 0.52 - 1.04 mg/dL Final     GFR Estimate   Date Value Ref Range Status   10/14/2022 >90 >60 mL/min/1.73m2 Final     Comment:     Effective December 21, 2021 eGFRcr in adults is calculated using the 2021 CKD-EPI creatinine equation which includes age and gender (Santi et al., NEJM, DOI: 10.1056/ZXBNpk5146836)   12/03/2020 89 >60 mL/min/[1.73_m2] Final     Comment:     Non  GFR Calc  Starting 12/18/2018, serum creatinine based estimated GFR (eGFR) will be   calculated using the Chronic Kidney Disease Epidemiology  Collaboration   (CKD-EPI) equation.       Calcium   Date Value Ref Range Status   10/14/2022 9.1 8.6 - 10.0 mg/dL Final   12/03/2020 8.7 8.5 - 10.1 mg/dL Final     Bilirubin Total   Date Value Ref Range Status   10/14/2022 0.5 <=1.2 mg/dL Final   03/03/2021 0.9 0.2 - 1.3 mg/dL Final     Alkaline Phosphatase   Date Value Ref Range Status   10/14/2022 82 35 - 104 U/L Final   03/03/2021 80 40 - 150 U/L Final     ALT   Date Value Ref Range Status   10/14/2022 23 10 - 35 U/L Final   03/03/2021 55 (H) 0 - 50 U/L Final     AST   Date Value Ref Range Status   10/14/2022 20 10 - 35 U/L Final   03/03/2021 31 0 - 45 U/L Final     US Abdomen:  GALLBLADDER: There is shadowing echogenic material in the region of the gallbladder, possibly representing a gallbladder filled with stones. The gallbladder wall is not well seen. No pericholecystic fluid is identified. The sonographer reports a negative   sonographic Oleary sign.      BILE DUCTS: There is no biliary dilatation. The common duct measures 2 mm. Portions of the common duct could not be visualized due to overlying bowel gas.     LIVER: The liver demonstrates normal echogenicity and echotexture. Hepatic contour is smooth. No focal mass.     RIGHT KIDNEY: The kidney demonstrates normal echogenicity with no hydronephrosis. Normal size.      PANCREAS: The visualized portions of the pancreas are normal.     No ascites is seen.                                                                      IMPRESSION:  1. Shadowing echogenic material in the right upper quadrant possibly represents a gallbladder filled with stones. The gallbladder wall is not well seen. Acute cholecystitis is difficult to exclude on the basis of this scan. Please note, if the patient   has had a prior cholecystectomy, bowel within the gallbladder fossa could also have this appearance.  2. No other cause for abdominal pain is identified. No biliary dilatation.     Assessment:  1. Symptomatic cholelithiasis       Plan:   Camilla Henao presents with symptomatic cholelithiasis. Symptoms are progressively worsening recently, increasing in intensity and in frequency. Examination is generally unremarkable, and liver function tests are normal. Ultrasound demonstrates gallstones, without biliary duct dilatation, gallbladder wall thickening or pericholecystic fluid.  The patient may benefit from laparoscopic cholecystectomy, and the indications, risks, benefits and alternatives to surgery were discussed in detail.  She understood the counseling offered and wishes to proceed as planned and outlined. Risks specifically discussed include  bleeding, infection, hernia, need for additional treatment, nontherapeutic intervention, wound complication (such as dehiscence), retained bile duct stone, conversion to open surgery, potential for cholangiography or bile duct exploration, drainage tubes, chronic diarrhea, bile duct injury, bile leak, and rare complications related to surgery and/or anesthesia such as venous thromboembolism and cardiorespiratory complications.      Jarrett Webb DO on 10/27/2022 at 11:54 AM        Again, thank you for allowing me to participate in the care of your patient.        Sincerely,        Jarrett Webb DO

## 2022-10-27 NOTE — PROGRESS NOTES
Surgical Consultation/History and Physical  [facility] General Surgery    Camilla is seen in consultation for Abdominal pain, at the request of Ha Velasco MD.    Chief Complaint:  Abdominal pain    History of Present Illness: Camilla Henao is a 27 year old female presents with abdominal pain.  She admits right flank pain which has since resolved.  She was seen in ED and had work-up.  She has modified her diet.  Denies acholic stools or melena.  No history of pancreatitis.      She has a history of cardiac surgery for tetralogy of fallot.      Patient Active Problem List   Diagnosis     SYNDROME V-Z VELO-CARDIO-FACIAL SYNDROME     Tetralogy of Fallot     Other specific developmental learning difficulties     CARDIOVASCULAR SCREENING; LDL GOAL LESS THAN 130     Adjustment disorder with mixed disturbance of emotions and conduct     Learning disorder     MENTAL HEALTH     Major depressive disorder, single episode, moderate (H)     Parent-child relational problem     Sibling relational problem     Borderline intellectual functioning     Abnormal finding on MRI of brain     Vitamin D deficiency     Gender dysphoria in adolescent and adult     Other schizophrenia (H)     Pulmonary valve insufficiency, unspecified etiology     Past Medical History:   Diagnosis Date     * * * SBE PROPHYLAXIS * * * 4/10/2006    Discontinued 2008 due to updated AHA recommendations     Depressive disorder 2012     History of blood transfusion     I think she had one for her heart surgery 1/1995     Other symbolic dysfunction(774.69)     verbal apraxia     Tetralogy of Fallot      Past Surgical History:   Procedure Laterality Date     CARDIAC SURGERY      As a child. No restrictions     ENT SURGERY      palate repair when young     SURGICAL HISTORY OF -   01/1995    Repair of Tetralogy     Family History   Problem Relation Age of Onset     Hyperlipidemia Paternal Grandmother      Hyperlipidemia Paternal Grandfather      Depression  Other 15        paternal cousin     Depression Other 15        paternal cousin     Depression Other 15        paternal cousin     Diabetes Maternal Great-Grandmother      Social History     Tobacco Use     Smoking status: Never     Smokeless tobacco: Never   Substance Use Topics     Alcohol use: No      History   Drug Use No     Current Outpatient Medications   Medication Sig Dispense Refill     ARIPIPRAZOLE PO Take 5 mg by mouth daily       FLUOXETINE HCL PO Take 40 mg by mouth daily       HYDROXYZINE HCL PO Take 25 mg by mouth 2 tablets up to 3 times per day as needed       norgestrel-ethinyl estradiol (CRYSELLE-28) 0.3-30 MG-MCG tablet TAKE ONE ACTIVE TABLET BY MOUTH DAILY CONTINUOUSLY. 112 tablet 4     No Known Allergies    Review of Systems:   10 point ROS otherwise negative    Lab Results   Component Value Date    WBC 13.5 10/14/2022    WBC 6.8 12/02/2020     Lab Results   Component Value Date    RBC 4.64 10/14/2022    RBC 4.43 12/02/2020     Lab Results   Component Value Date    HGB 14.2 10/14/2022    HGB 13.4 12/02/2020     Lab Results   Component Value Date    HCT 42.8 10/14/2022    HCT 40.5 12/02/2020     Lab Results   Component Value Date    MCV 92 10/14/2022    MCV 91 12/02/2020     Lab Results   Component Value Date    MCH 30.6 10/14/2022    MCH 30.2 12/02/2020     Lab Results   Component Value Date    MCHC 33.2 10/14/2022    MCHC 33.1 12/02/2020     Lab Results   Component Value Date    RDW 12.9 10/14/2022    RDW 13.8 12/02/2020     Lab Results   Component Value Date     10/14/2022     12/02/2020     Last Comprehensive Metabolic Panel:  Sodium   Date Value Ref Range Status   10/14/2022 136 136 - 145 mmol/L Final   12/03/2020 137 133 - 144 mmol/L Final     Potassium   Date Value Ref Range Status   10/14/2022 3.8 3.4 - 5.3 mmol/L Final   05/03/2022 3.7 3.4 - 5.3 mmol/L Final   12/03/2020 3.9 3.4 - 5.3 mmol/L Final     Chloride   Date Value Ref Range Status   10/14/2022 101 98 - 107 mmol/L  Final   05/03/2022 107 94 - 109 mmol/L Final   12/03/2020 108 94 - 109 mmol/L Final     Carbon Dioxide   Date Value Ref Range Status   12/03/2020 23 20 - 32 mmol/L Final     Carbon Dioxide (CO2)   Date Value Ref Range Status   10/14/2022 21 (L) 22 - 29 mmol/L Final   05/03/2022 25 20 - 32 mmol/L Final     Anion Gap   Date Value Ref Range Status   10/14/2022 14 7 - 15 mmol/L Final   05/03/2022 7 3 - 14 mmol/L Final   12/03/2020 6 3 - 14 mmol/L Final     Glucose   Date Value Ref Range Status   10/14/2022 102 (H) 70 - 99 mg/dL Final   05/03/2022 66 (L) 70 - 99 mg/dL Final   05/03/2022 66 (L) 70 - 99 mg/dL Final   03/03/2021 81 70 - 99 mg/dL Final     Urea Nitrogen   Date Value Ref Range Status   10/14/2022 11.6 6.0 - 20.0 mg/dL Final   05/03/2022 10 7 - 30 mg/dL Final   12/03/2020 9 7 - 30 mg/dL Final     Creatinine   Date Value Ref Range Status   10/14/2022 0.76 0.51 - 0.95 mg/dL Final   12/03/2020 0.89 0.52 - 1.04 mg/dL Final     GFR Estimate   Date Value Ref Range Status   10/14/2022 >90 >60 mL/min/1.73m2 Final     Comment:     Effective December 21, 2021 eGFRcr in adults is calculated using the 2021 CKD-EPI creatinine equation which includes age and gender (Santi et al., NEJM, DOI: 10.1056/JWONww9053835)   12/03/2020 89 >60 mL/min/[1.73_m2] Final     Comment:     Non  GFR Calc  Starting 12/18/2018, serum creatinine based estimated GFR (eGFR) will be   calculated using the Chronic Kidney Disease Epidemiology Collaboration   (CKD-EPI) equation.       Calcium   Date Value Ref Range Status   10/14/2022 9.1 8.6 - 10.0 mg/dL Final   12/03/2020 8.7 8.5 - 10.1 mg/dL Final     Bilirubin Total   Date Value Ref Range Status   10/14/2022 0.5 <=1.2 mg/dL Final   03/03/2021 0.9 0.2 - 1.3 mg/dL Final     Alkaline Phosphatase   Date Value Ref Range Status   10/14/2022 82 35 - 104 U/L Final   03/03/2021 80 40 - 150 U/L Final     ALT   Date Value Ref Range Status   10/14/2022 23 10 - 35 U/L Final   03/03/2021 55 (H)  0 - 50 U/L Final     AST   Date Value Ref Range Status   10/14/2022 20 10 - 35 U/L Final   03/03/2021 31 0 - 45 U/L Final     US Abdomen:  GALLBLADDER: There is shadowing echogenic material in the region of the gallbladder, possibly representing a gallbladder filled with stones. The gallbladder wall is not well seen. No pericholecystic fluid is identified. The sonographer reports a negative   sonographic Oleary sign.      BILE DUCTS: There is no biliary dilatation. The common duct measures 2 mm. Portions of the common duct could not be visualized due to overlying bowel gas.     LIVER: The liver demonstrates normal echogenicity and echotexture. Hepatic contour is smooth. No focal mass.     RIGHT KIDNEY: The kidney demonstrates normal echogenicity with no hydronephrosis. Normal size.      PANCREAS: The visualized portions of the pancreas are normal.     No ascites is seen.                                                                      IMPRESSION:  1. Shadowing echogenic material in the right upper quadrant possibly represents a gallbladder filled with stones. The gallbladder wall is not well seen. Acute cholecystitis is difficult to exclude on the basis of this scan. Please note, if the patient   has had a prior cholecystectomy, bowel within the gallbladder fossa could also have this appearance.  2. No other cause for abdominal pain is identified. No biliary dilatation.     Assessment:  1. Symptomatic cholelithiasis      Plan:   Camilla Henao presents with symptomatic cholelithiasis. Symptoms are progressively worsening recently, increasing in intensity and in frequency. Examination is generally unremarkable, and liver function tests are normal. Ultrasound demonstrates gallstones, without biliary duct dilatation, gallbladder wall thickening or pericholecystic fluid.  The patient may benefit from laparoscopic cholecystectomy, and the indications, risks, benefits and alternatives to surgery were discussed in  detail.  She understood the counseling offered and wishes to proceed as planned and outlined. Risks specifically discussed include  bleeding, infection, hernia, need for additional treatment, nontherapeutic intervention, wound complication (such as dehiscence), retained bile duct stone, conversion to open surgery, potential for cholangiography or bile duct exploration, drainage tubes, chronic diarrhea, bile duct injury, bile leak, and rare complications related to surgery and/or anesthesia such as venous thromboembolism and cardiorespiratory complications.      Jarrett Webb,  on 10/27/2022 at 11:54 AM

## 2022-10-27 NOTE — PROGRESS NOTES
Camilla is a 27 year old who is being evaluated via a billable telephone visit.      What phone number would you like to be contacted at? [phone]  How would you like to obtain your AVS? Buck      Phone call duration: 14 minutes

## 2022-11-03 ENCOUNTER — OFFICE VISIT (OUTPATIENT)
Dept: FAMILY MEDICINE | Facility: CLINIC | Age: 27
End: 2022-11-03
Payer: COMMERCIAL

## 2022-11-03 VITALS
HEIGHT: 62 IN | DIASTOLIC BLOOD PRESSURE: 80 MMHG | RESPIRATION RATE: 18 BRPM | OXYGEN SATURATION: 98 % | HEART RATE: 85 BPM | WEIGHT: 167 LBS | BODY MASS INDEX: 30.73 KG/M2 | TEMPERATURE: 98.1 F | SYSTOLIC BLOOD PRESSURE: 110 MMHG

## 2022-11-03 DIAGNOSIS — F20.89 OTHER SCHIZOPHRENIA (H): ICD-10-CM

## 2022-11-03 DIAGNOSIS — K80.00 CALCULUS OF GALLBLADDER WITH ACUTE CHOLECYSTITIS WITHOUT OBSTRUCTION: ICD-10-CM

## 2022-11-03 DIAGNOSIS — F43.25 ADJUSTMENT DISORDER WITH MIXED DISTURBANCE OF EMOTIONS AND CONDUCT: ICD-10-CM

## 2022-11-03 DIAGNOSIS — R41.83 BORDERLINE INTELLECTUAL FUNCTIONING: ICD-10-CM

## 2022-11-03 DIAGNOSIS — Z01.818 PREOP GENERAL PHYSICAL EXAM: Primary | ICD-10-CM

## 2022-11-03 DIAGNOSIS — Q93.81 VELO-CARDIO-FACIAL SYNDROME: ICD-10-CM

## 2022-11-03 DIAGNOSIS — Q21.3 TETRALOGY OF FALLOT: ICD-10-CM

## 2022-11-03 DIAGNOSIS — I37.1 PULMONARY VALVE INSUFFICIENCY, UNSPECIFIED ETIOLOGY: ICD-10-CM

## 2022-11-03 PROCEDURE — 99214 OFFICE O/P EST MOD 30 MIN: CPT | Performed by: NURSE PRACTITIONER

## 2022-11-03 ASSESSMENT — PATIENT HEALTH QUESTIONNAIRE - PHQ9
10. IF YOU CHECKED OFF ANY PROBLEMS, HOW DIFFICULT HAVE THESE PROBLEMS MADE IT FOR YOU TO DO YOUR WORK, TAKE CARE OF THINGS AT HOME, OR GET ALONG WITH OTHER PEOPLE: NOT DIFFICULT AT ALL
SUM OF ALL RESPONSES TO PHQ QUESTIONS 1-9: 2
SUM OF ALL RESPONSES TO PHQ QUESTIONS 1-9: 2

## 2022-11-03 NOTE — H&P (VIEW-ONLY)
Answers for HPI/ROS submitted by the patient on 11/3/2022  If you checked off any problems, how difficult have these problems made it for you to do your work, take care of things at home, or get along with other people?: Not difficult at all  PHQ9 TOTAL SCORE: 2    [facility]  [address] [address]  Phone: [phone]  Primary Provider: Ha Velasco  Pre-op Performing Provider: FLORY LAND    PREOPERATIVE EVALUATION:  Today's date: 11/3/2022    Camilla Henao is a 27 year old female who presents for a preoperative evaluation.    Surgical Information:  Surgery/Procedure: CHOLECYSTECTOMY, LAPAROSCOPIC  Surgery Location: [facility]   Surgeon: Jarrett Webb,   Surgery Date: 11/7/2022  Time of Surgery: 11:00am   Where patient plans to recover: At home with family  Fax number for surgical facility: Note does not need to be faxed, will be available electronically in Epic.    Type of Anesthesia Anticipated: General    Assessment & Plan     The proposed surgical procedure is considered INTERMEDIATE risk.    (Z01.818) Preop general physical exam  (primary encounter diagnosis)  Comment: Cardiology saw patient yesterday (11/2) for yearly visit in regard diagnosis of tetralogy of fallot. Per mom, cardiology approved patient for surgery and that EKG, ECHO and stress test were completed and normal. Unable to access tests at this time. Note from Dr. Azar indicated no immediate concerns. Approval given for surgery.  Plan:     (K80.00) Calculus of gallbladder with acute cholecystitis without obstruction  Comment: Stable, reports decrease in pain with diet and lifestyle modifications.     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    [facility]  Independent interpretation of a test performed by another physician/other qualified health care professional (not separately reported) - ECHO, EKG and Stress test completed 11/2 at [facility], unable to access from epic at this time.  See cardiology note in [facility] for appointment.    Subjective     HPI related to upcoming procedure: Intermittent abdominal pain    Preop Questions 10/28/2022   1. Have you ever had a heart attack or stroke? No   2. Have you ever had surgery on your heart or blood vessels, such as a stent placement, a coronary artery bypass, or surgery on an artery in your head, neck, heart, or legs? No   3. Do you have chest pain with activity? No   4. Do you have a history of  heart failure? No   5. Do you currently have a cold, bronchitis or symptoms of other infection? No   6. Do you have a cough, shortness of breath, or wheezing? No   7. Do you or anyone in your family have previous history of blood clots? No   8. Do you or does anyone in your family have a serious bleeding problem such as prolonged bleeding following surgeries or cuts? No   9. Have you ever had problems with anemia or been told to take iron pills? No   10. Have you had any abnormal blood loss such as black,  tarry or bloody stools, or abnormal vaginal bleeding? No   11. Have you ever had a blood transfusion? YES - When she had heart surgery at 4 months old. No concerns.   11a. Have you ever had a transfusion reaction? No   12. Are you willing to have a blood transfusion if it is medically needed before, during, or after your surgery? Yes   13. Have you or any of your relatives ever had problems with anesthesia? No   14. Do you have sleep apnea, excessive snoring or daytime drowsiness? No   15. Do you have any artifical heart valves or other implanted medical devices like a pacemaker, defibrillator, or continuous glucose monitor? No   16. Do you have artificial joints? No   17. Are you allergic to latex? No   18. Is there any chance that you may be pregnant? No       Health Care Directive:  Patient does not have a Health Care Directive or Living Will: Discussed advance care planning with patient; however, patient declined at this time.    Preoperative Review of :   reviewed - controlled substances reflected in medication list.    Review of Systems  CONSTITUTIONAL: NEGATIVE for fever, chills, change in weight  INTEGUMENTARY/SKIN: NEGATIVE for worrisome rashes, moles or lesions  EYES: NEGATIVE for vision changes or irritation  ENT/MOUTH: NEGATIVE for ear, mouth and throat problems  RESP: NEGATIVE for significant cough or SOB  CV: NEGATIVE for chest pain, palpitations or peripheral edema  GI: POSITIVE for abdominal pain RUQ and NEGATIVE for poor appetite, vomiting and weight loss  : NEGATIVE for frequency, dysuria, or hematuria  MUSCULOSKELETAL: NEGATIVE for significant arthralgias or myalgia  NEURO: POSITIVE for dizziness/lightheadedness and NEGATIVE for behavior changes, gait disturbance and syncope  ENDOCRINE: NEGATIVE for temperature intolerance, skin/hair changes  HEME: NEGATIVE for bleeding problems  PSYCHIATRIC: NEGATIVE for changes in mood or affect    Patient Active Problem List    Diagnosis Date Noted      "Pulmonary valve insufficiency, unspecified etiology 08/09/2018     Priority: Medium     Other schizophrenia (H) 11/12/2017     Priority: Medium     Diagnosed at age 17.  History of hearing voices (voice would tell her to hurt other people).  History of cutting.    Seeing Hector at [facility].       Gender dysphoria in adolescent and adult 08/30/2017     Priority: Medium     Vitamin D deficiency 01/12/2017     Priority: Medium     Started on 5000 units daily in early 2017, plan recheck in spring and reduction to maintenance daily dose.       Abnormal finding on MRI of brain 07/25/2016     Priority: Medium     MENTAL HEALTH 03/10/2016     Priority: Medium     Receiving behavioral health services at [facility], Adult Intensive [facility]; therapist is Paola York, [facility]  [phone]       Parent-child relational problem 03/10/2016     Priority: Medium     Sibling relational problem 03/10/2016     Priority: Medium     Borderline intellectual functioning 03/10/2016     Priority: Medium     Major depressive disorder, single episode, moderate (H) 04/24/2013     Priority: Medium     Learning disorder 04/08/2013     Priority: Medium     Adjustment disorder with mixed disturbance of emotions and conduct 04/06/2013     Priority: Medium     CARDIOVASCULAR SCREENING; LDL GOAL LESS THAN 130 02/28/2013     Priority: Medium     Other specific developmental learning difficulties 11/25/2008     Priority: Medium     See results of neuropsychological testing and recommendations from 10- (see \"other orders\" referral attachment)       SYNDROME V-Z VELO-CARDIO-FACIAL SYNDROME 04/10/2006     Priority: Medium     Tetralogy of Fallot 04/10/2006     Priority: Medium     S/P VSD closure and RV outflow patch augmentation 1/11/95  Echocardiogram 4/1/2008: excellent ventricular function, mild RV enlargement, moderate to severe pulmonary insufficiency, trivial pulmonary stenosis  Followed by Dr. Arnaldo Toledo at [facility] -- no restrictions, off SBE prophylaxis due to new recommendations, plan recheck in one year with MRI prior to visit  2009 MRI: complete report sent to [facility] for abstracting into EPIC        Past Medical History:   Diagnosis Date     * * * SBE PROPHYLAXIS * * * 4/10/2006    Discontinued 2008 due to updated AHA recommendations     Depressive disorder 2012     History of blood transfusion     I think she had one for her heart surgery 1/1995     Other symbolic dysfunction(624.69)     verbal apraxia     Tetralogy of Fallot      Past Surgical History:   Procedure Laterality Date     CARDIAC SURGERY      As a child. No restrictions     ENT SURGERY      palate repair when young     SURGICAL HISTORY OF -   01/1995    Repair of Tetralogy     Current Outpatient Medications   Medication Sig Dispense Refill     ARIPIPRAZOLE PO Take 5 mg by mouth daily       FLUOXETINE HCL PO Take 40 mg by mouth daily       HYDROXYZINE HCL PO Take 25 mg by mouth 2 tablets up to 3 times per day as needed       norgestrel-ethinyl estradiol (CRYSELLE-28) 0.3-30 MG-MCG tablet TAKE ONE ACTIVE TABLET BY MOUTH DAILY CONTINUOUSLY. 112 tablet 4       No Known Allergies     Social History     Tobacco Use     Smoking status: Never     Smokeless tobacco: Never   Substance Use Topics     Alcohol use: No     Family History   Problem Relation Age of Onset     Hyperlipidemia Paternal Grandmother      Hyperlipidemia Paternal Grandfather      Diabetes Maternal Great-Grandmother      Depression Other 15        paternal cousin     Depression Other 15        paternal cousin     Depression Other 15        paternal cousin     History   Drug Use No         Objective     /80   Pulse 85   Temp 98.1  F (36.7  C) (Tympanic)   Resp 18   Ht 1.575 m (5' 2\")   Wt 75.8 kg (167 lb)   SpO2 98%   BMI 30.54 kg/m      Physical Exam    GENERAL APPEARANCE: healthy, alert and no distress     EYES: EOMI, PERRL     HENT: ear canals and TM's normal and " nose and mouth without ulcers or lesions     NECK: no adenopathy, no asymmetry, masses, or scars and thyroid normal to palpation     RESP: lungs clear to auscultation - no rales, rhonchi or wheezes     CV: regular rates and rhythm, normal S1 S2, no S3 or S4 and no murmur, click or rub     ABDOMEN:  soft, nontender, no HSM or masses and bowel sounds normal     MS: extremities normal- no gross deformities noted, no evidence of inflammation in joints, FROM in all extremities.     SKIN: no suspicious lesions or rashes     NEURO: Normal strength and tone, sensory exam grossly normal, mentation intact and speech normal     PSYCH: mentation appears normal. and affect normal/bright     LYMPHATICS: No cervical adenopathy    Recent Labs   Lab Test 10/14/22  1937 05/03/22  0700 03/03/21  0706   HGB 14.2 13.4  --     167  --     139  --    POTASSIUM 3.8 3.7  --    CR 0.76 0.76  --    A1C  --  5.3 5.2        Diagnostics:  No labs were ordered during this visit.   No EKG this visit, completed in the last 90 days. Completed on 11/2 at [facility]     Revised Cardiac Risk Index (RCRI):  The patient has the following serious cardiovascular risks for perioperative complications:   - No serious cardiac risks = 0 points     RCRI Interpretation: 0 points: Class I (very low risk - 0.4% complication rate)    STEPHANIE Beltran-Student    Signed Electronically by: Nicolle Beckett NP  Copy of this evaluation report is provided to requesting physician.      I, Nicolle Beckett, was present with the NP student who participated in the service and in the documentationof the note.   I have verified the history and personally performed the physical exam and medical decision making.  I agree with the assessment and plan of care as documented in the note.     Nicolle Beckett NP on 11/3/2022 at 12:12 PM

## 2022-11-03 NOTE — PROGRESS NOTES
Answers for HPI/ROS submitted by the patient on 11/3/2022  If you checked off any problems, how difficult have these problems made it for you to do your work, take care of things at home, or get along with other people?: Not difficult at all  PHQ9 TOTAL SCORE: 2    [facility]  [address] [address]  Phone: [phone]  Primary Provider: Ha Velasco  Pre-op Performing Provider: FLORY LAND    PREOPERATIVE EVALUATION:  Today's date: 11/3/2022    Camilla Henao is a 27 year old female who presents for a preoperative evaluation.    Surgical Information:  Surgery/Procedure: CHOLECYSTECTOMY, LAPAROSCOPIC  Surgery Location: [facility]   Surgeon: Jarrett Webb,   Surgery Date: 11/7/2022  Time of Surgery: 11:00am   Where patient plans to recover: At home with family  Fax number for surgical facility: Note does not need to be faxed, will be available electronically in Epic.    Type of Anesthesia Anticipated: General    Assessment & Plan     The proposed surgical procedure is considered INTERMEDIATE risk.    (Z01.818) Preop general physical exam  (primary encounter diagnosis)  Comment: Cardiology saw patient yesterday (11/2) for yearly visit in regard diagnosis of tetralogy of fallot. Per mom, cardiology approved patient for surgery and that EKG, ECHO and stress test were completed and normal. Unable to access tests at this time. Note from Dr. Azar indicated no immediate concerns. Approval given for surgery.  Plan:     (K80.00) Calculus of gallbladder with acute cholecystitis without obstruction  Comment: Stable, reports decrease in pain with diet and lifestyle modifications.     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    [facility]  Independent interpretation of a test performed by another physician/other qualified health care professional (not separately reported) - ECHO, EKG and Stress test completed 11/2 at [facility], unable to access from epic at this time.  See cardiology note in [facility] for appointment.    Subjective     HPI related to upcoming procedure: Intermittent abdominal pain    Preop Questions 10/28/2022   1. Have you ever had a heart attack or stroke? No   2. Have you ever had surgery on your heart or blood vessels, such as a stent placement, a coronary artery bypass, or surgery on an artery in your head, neck, heart, or legs? No   3. Do you have chest pain with activity? No   4. Do you have a history of  heart failure? No   5. Do you currently have a cold, bronchitis or symptoms of other infection? No   6. Do you have a cough, shortness of breath, or wheezing? No   7. Do you or anyone in your family have previous history of blood clots? No   8. Do you or does anyone in your family have a serious bleeding problem such as prolonged bleeding following surgeries or cuts? No   9. Have you ever had problems with anemia or been told to take iron pills? No   10. Have you had any abnormal blood loss such as black,  tarry or bloody stools, or abnormal vaginal bleeding? No   11. Have you ever had a blood transfusion? YES - When she had heart surgery at 4 months old. No concerns.   11a. Have you ever had a transfusion reaction? No   12. Are you willing to have a blood transfusion if it is medically needed before, during, or after your surgery? Yes   13. Have you or any of your relatives ever had problems with anesthesia? No   14. Do you have sleep apnea, excessive snoring or daytime drowsiness? No   15. Do you have any artifical heart valves or other implanted medical devices like a pacemaker, defibrillator, or continuous glucose monitor? No   16. Do you have artificial joints? No   17. Are you allergic to latex? No   18. Is there any chance that you may be pregnant? No       Health Care Directive:  Patient does not have a Health Care Directive or Living Will: Discussed advance care planning with patient; however, patient declined at this time.    Preoperative Review of :   reviewed - controlled substances reflected in medication list.    Review of Systems  CONSTITUTIONAL: NEGATIVE for fever, chills, change in weight  INTEGUMENTARY/SKIN: NEGATIVE for worrisome rashes, moles or lesions  EYES: NEGATIVE for vision changes or irritation  ENT/MOUTH: NEGATIVE for ear, mouth and throat problems  RESP: NEGATIVE for significant cough or SOB  CV: NEGATIVE for chest pain, palpitations or peripheral edema  GI: POSITIVE for abdominal pain RUQ and NEGATIVE for poor appetite, vomiting and weight loss  : NEGATIVE for frequency, dysuria, or hematuria  MUSCULOSKELETAL: NEGATIVE for significant arthralgias or myalgia  NEURO: POSITIVE for dizziness/lightheadedness and NEGATIVE for behavior changes, gait disturbance and syncope  ENDOCRINE: NEGATIVE for temperature intolerance, skin/hair changes  HEME: NEGATIVE for bleeding problems  PSYCHIATRIC: NEGATIVE for changes in mood or affect    Patient Active Problem List    Diagnosis Date Noted      "Pulmonary valve insufficiency, unspecified etiology 08/09/2018     Priority: Medium     Other schizophrenia (H) 11/12/2017     Priority: Medium     Diagnosed at age 17.  History of hearing voices (voice would tell her to hurt other people).  History of cutting.    Seeing Hector at [facility].       Gender dysphoria in adolescent and adult 08/30/2017     Priority: Medium     Vitamin D deficiency 01/12/2017     Priority: Medium     Started on 5000 units daily in early 2017, plan recheck in spring and reduction to maintenance daily dose.       Abnormal finding on MRI of brain 07/25/2016     Priority: Medium     MENTAL HEALTH 03/10/2016     Priority: Medium     Receiving behavioral health services at [facility], Adult Int[facility]; therapist is Paola York, [facility]  [phone]       Parent-child relational problem 03/10/2016     Priority: Medium     Sibling relational problem 03/10/2016     Priority: Medium     Borderline intellectual functioning 03/10/2016     Priority: Medium     Major depressive disorder, single episode, moderate (H) 04/24/2013     Priority: Medium     Learning disorder 04/08/2013     Priority: Medium     Adjustment disorder with mixed disturbance of emotions and conduct 04/06/2013     Priority: Medium     CARDIOVASCULAR SCREENING; LDL GOAL LESS THAN 130 02/28/2013     Priority: Medium     Other specific developmental learning difficulties 11/25/2008     Priority: Medium     See results of neuropsychological testing and recommendations from 10- (see \"other orders\" referral attachment)       SYNDROME V-Z VELO-CARDIO-FACIAL SYNDROME 04/10/2006     Priority: Medium     Tetralogy of Fallot 04/10/2006     Priority: Medium     S/P VSD closure and RV outflow patch augmentation 1/11/95  Echocardiogram 4/1/2008: excellent ventricular function, mild RV enlargement, moderate to severe pulmonary insufficiency, trivial pulmonary stenosis  Followed by [facility] Arnaldo Toledo at [facility] -- no restrictions, off SBE prophylaxis due to new recommendations, plan recheck in one year with MRI prior to visit  2009 MRI: complete report sent to [facility] for abstracting into EPIC        Past Medical History:   Diagnosis Date     * * * SBE PROPHYLAXIS * * * 4/10/2006    Discontinued 2008 due to updated AHA recommendations     Depressive disorder 2012     History of blood transfusion     I think she had one for her heart surgery 1/1995     Other symbolic dysfunction(874.69)     verbal apraxia     Tetralogy of Fallot      Past Surgical History:   Procedure Laterality Date     CARDIAC SURGERY      As a child. No restrictions     ENT SURGERY      palate repair when young     SURGICAL HISTORY OF -   01/1995    Repair of Tetralogy     Current Outpatient Medications   Medication Sig Dispense Refill     ARIPIPRAZOLE PO Take 5 mg by mouth daily       FLUOXETINE HCL PO Take 40 mg by mouth daily       HYDROXYZINE HCL PO Take 25 mg by mouth 2 tablets up to 3 times per day as needed       norgestrel-ethinyl estradiol (CRYSELLE-28) 0.3-30 MG-MCG tablet TAKE ONE ACTIVE TABLET BY MOUTH DAILY CONTINUOUSLY. 112 tablet 4       No Known Allergies     Social History     Tobacco Use     Smoking status: Never     Smokeless tobacco: Never   Substance Use Topics     Alcohol use: No     Family History   Problem Relation Age of Onset     Hyperlipidemia Paternal Grandmother      Hyperlipidemia Paternal Grandfather      Diabetes Maternal Great-Grandmother      Depression Other 15        paternal cousin     Depression Other 15        paternal cousin     Depression Other 15        paternal cousin     History   Drug Use No         Objective     /80   Pulse 85   Temp 98.1  F (36.7  C) (Tympanic)   Resp 18   Ht 1.575 m (5' 2\")   Wt 75.8 kg (167 lb)   SpO2 98%   BMI 30.54 kg/m      Physical Exam    GENERAL APPEARANCE: healthy, alert and no distress     EYES: EOMI, PERRL     HENT: ear canals and TM's normal and " nose and mouth without ulcers or lesions     NECK: no adenopathy, no asymmetry, masses, or scars and thyroid normal to palpation     RESP: lungs clear to auscultation - no rales, rhonchi or wheezes     CV: regular rates and rhythm, normal S1 S2, no S3 or S4 and no murmur, click or rub     ABDOMEN:  soft, nontender, no HSM or masses and bowel sounds normal     MS: extremities normal- no gross deformities noted, no evidence of inflammation in joints, FROM in all extremities.     SKIN: no suspicious lesions or rashes     NEURO: Normal strength and tone, sensory exam grossly normal, mentation intact and speech normal     PSYCH: mentation appears normal. and affect normal/bright     LYMPHATICS: No cervical adenopathy    Recent Labs   Lab Test 10/14/22  1937 05/03/22  0700 03/03/21  0706   HGB 14.2 13.4  --     167  --     139  --    POTASSIUM 3.8 3.7  --    CR 0.76 0.76  --    A1C  --  5.3 5.2        Diagnostics:  No labs were ordered during this visit.   No EKG this visit, completed in the last 90 days. Com[facility] 11/2 at [facility]     Revised Cardiac Risk Index (RCRI):  The patient has the following serious cardiovascular risks for perioperative complications:   - No serious cardiac risks = 0 points     RCRI Interpretation: 0 points: Class I (very low risk - 0.4% complication rate)    STEPHANIE Beltran-Student    Signed Electronically by: Nicolle Beckett NP  Copy of this evaluation report is provided to requesting physician.      I, Nicolle Beckett, was present with the NP student who participated in the service and in the documentationof the note.   I have verified the history and personally performed the physical exam and medical decision making.  I agree with the assessment and plan of care as documented in the note.     Nicolle Beckett NP on 11/3/2022 at 12:12 PM

## 2022-11-03 NOTE — PATIENT INSTRUCTIONS
Preparing for Your Surgery  Getting started  A nurse will call you to review your health history and instructions. They will give you an arrival time based on your scheduled surgery time. Please be ready to share:  Your doctor s clinic name and phone number  Your medical, surgical, and anesthesia history  A list of allergies and sensitivities  A list of medicines, including herbal treatments and over-the-counter drugs  Whether the patient has a legal guardian (ask how to send us the papers in advance)  Please tell us if you re pregnant--or if there s any chance you might be pregnant. Some surgeries may injure a fetus (unborn baby), so they require a pregnancy test. Surgeries that are safe for a fetus don t always need a test, and you can choose whether to have one.   If you have a child who s having surgery, please ask for a copy of Preparing for Your Child s Surgery.    Preparing for surgery  Within 10 to 30 days of surgery: Have a pre-op exam (sometimes called an H&P, or History and Physical). This can be done at a clinic or pre-operative center.  If you re having a , you may not need this exam. Talk to your care team.  At your pre-op exam, talk to your care team about all medicines you take. If you need to stop any medicines before surgery, ask when to start taking them again.  We do this for your safety. Many medicines can make you bleed too much during surgery. Some change how well surgery (anesthesia) drugs work.  Call your insurance company to let them know you re having surgery. (If you don t have insurance, call [phone].)  Call your clinic if there s any change in your health. This includes signs of a cold or flu (sore throat, runny nose, cough, rash, fever). It also includes a scrape or scratch near the surgery site.  If you have questions on the day of surgery, call your hospital or surgery center.  COVID testing  You may need to be tested for COVID-19 before having surgery. If so, we will  give you instructions (or click here).  Eating and drinking guidelines  For your safety: Unless your surgeon tells you otherwise, follow the guidelines below.  Eat and drink as usual until 8 hours before you arrive for surgery. After that, no food or milk.  Drink clear liquids until 2 hours before you arrive. These are liquids you can see through, like water, Gatorade, and Propel Water. They also include plain black coffee and tea (no cream or milk), candy, and breath mints. You can spit out gum when you arrive.  If you drink alcohol: Stop drinking it the night before surgery.  If your care team tells you to take medicine on the morning of surgery, it s okay to take it with a sip of water.  Preventing infection  Shower or bathe the night before and morning of your surgery. Follow the instructions your clinic gave you. (If no instructions, use regular soap.)  Don t shave or clip hair near your surgery site. We ll remove the hair if needed.  Don t smoke or vape the morning of surgery. You may chew nicotine gum up to 2 hours before surgery. A nicotine patch is okay.  Note: Some surgeries require you to completely quit smoking and nicotine. Check with your surgeon.  Your care team will make every effort to keep you safe from infection. We will:  Clean our hands often with soap and water (or an alcohol-based hand rub).  Clean the skin at your surgery site with a special soap that kills germs.  Give you a special gown to keep you warm. (Cold raises the risk of infection.)  Wear special hair covers, masks, gowns and gloves during surgery.  Give antibiotic medicine, if prescribed. Not all surgeries need antibiotics.  What to bring on the day of surgery  Photo ID and insurance card  Copy of your health care directive, if you have one  Glasses and hearing aids (bring cases)  You can t wear contacts during surgery  Inhaler and eye drops, if you use them (tell us about these when you arrive)  CPAP machine or breathing device,  if you use them  A few personal items, if spending the night  If you have . . .  A pacemaker, ICD (cardiac defibrillator) or other implant: Bring the ID card.  An implanted stimulator: Bring the remote control.  A legal guardian: Bring a copy of the certified (court-stamped) guardianship papers.  Please remove any jewelry, including body piercings. Leave jewelry and other valuables at home.  If you re going home the day of surgery  You must have a responsible adult drive you home. They should stay with you overnight as well.  If you don t have someone to stay with you, and you aren t safe to go home alone, we may keep you overnight. Insurance often won t pay for this.  After surgery  If it s hard to control your pain or you need more pain medicine, please call your surgeon s office.  Questions?   If you have any questions for your care team, list them here:   ____________________________________________________________________________________________________________________________________________________________________________________________________________________________________________________________________  For informational purposes only. Not to replace the advice of your health care provider. Copyright   2003, 2019 [employer]. All rights reserved. Clinically reviewed by Kimberly Mack MD. [employer] 090488 - REV 10/22.    How to Take Your Medication Before Surgery  - Take all of your medications before surgery as usual  - STOP taking all vitamins and herbal supplements 14 days before surgery.

## 2022-11-04 ENCOUNTER — ANESTHESIA EVENT (OUTPATIENT)
Dept: SURGERY | Facility: CLINIC | Age: 27
End: 2022-11-04
Payer: COMMERCIAL

## 2022-11-04 NOTE — ANESTHESIA PREPROCEDURE EVALUATION
Anesthesia Pre-Procedure Evaluation    Patient: Camilla Henao   MRN: [id number] : 1995        Procedure : Procedure(s):  CHOLECYSTECTOMY, LAPAROSCOPIC          Past Medical History:   Diagnosis Date     * * * SBE PROPHYLAXIS * * * 4/10/2006    Discontinued  due to updated AHA recommendations     Depressive disorder      History of blood transfusion     I think she had one for her heart surgery 1995     Other symbolic dysfunction(784.69)     verbal apraxia     Tetralogy of Fallot       Past Surgical History:   Procedure Laterality Date     CARDIAC SURGERY      As a child. No restrictions     ENT SURGERY      palate repair when young     SURGICAL HISTORY OF -   1995    Repair of Tetralogy      No Known Allergies   Social History     Tobacco Use     Smoking status: Never     Smokeless tobacco: Never   Substance Use Topics     Alcohol use: No      Wt Readings from Last 1 Encounters:   22 75.8 kg (167 lb)        Anesthesia Evaluation   Pt has had prior anesthetic. Type: General.    No history of anesthetic complications       ROS/MED HX  ENT/Pulmonary:  - neg pulmonary ROS     Neurologic:  - neg neurologic ROS     Cardiovascular: Comment: Preop general physical exam  (primary encounter diagnosis)  Comment: Cardiology saw patient yesterday () for yearly visit in regard diagnosis of tetralogy of fallot. Per mom, cardiology approved patient for surgery and that EKG, ECHO and stress test were completed and normal. Unable to access tests at this time. Note from Dr. Azar indicated no immediate concerns. Approval given for surgery.      SYNDROME V-Z VELO-CARDIO-FACIAL SYNDROME  Tetralogy of Fallot          METS/Exercise Tolerance: >4 METS    Hematologic:  - neg hematologic  ROS     Musculoskeletal:  - neg musculoskeletal ROS     GI/Hepatic:  - neg GI/hepatic ROS     Renal/Genitourinary:  - neg Renal ROS     Endo:  - neg endo ROS     Psychiatric/Substance Use: Comment: Adjustment disorder with  mixed disturbance of emotions and conduct  Learning disorder    Borderline intellectual functioning    (+) psychiatric history depression and schizophrenia     Infectious Disease:  - neg infectious disease ROS     Malignancy:  - neg malignancy ROS     Other:  - neg other ROS          Physical Exam    Airway  airway exam normal      Mallampati: II   TM distance: > 3 FB   Neck ROM: full   Mouth opening: > 3 cm    Respiratory Devices and Support         Dental  no notable dental history         Cardiovascular   cardiovascular exam normal       Rhythm and rate: regular and normal     Pulmonary   pulmonary exam normal        breath sounds clear to auscultation           OUTSIDE LABS:  CBC:   Lab Results   Component Value Date    WBC 13.5 (H) 10/14/2022    WBC 7.3 05/03/2022    HGB 14.2 10/14/2022    HGB 13.4 05/03/2022    HCT 42.8 10/14/2022    HCT 41.1 05/03/2022     10/14/2022     05/03/2022     BMP:   Lab Results   Component Value Date     10/14/2022     05/03/2022    POTASSIUM 3.8 10/14/2022    POTASSIUM 3.7 05/03/2022    CHLORIDE 101 10/14/2022    CHLORIDE 107 05/03/2022    CO2 21 (L) 10/14/2022    CO2 25 05/03/2022    BUN 11.6 10/14/2022    BUN 10 05/03/2022    CR 0.76 10/14/2022    CR 0.76 05/03/2022     (H) 10/14/2022    GLC 66 (L) 05/03/2022    GLC 66 (L) 05/03/2022     COAGS: No results found for: PTT, INR, FIBR  POC:   Lab Results   Component Value Date    HCG Negative 12/27/2019    HCGS Negative 06/17/2016     HEPATIC:   Lab Results   Component Value Date    ALBUMIN 4.4 10/14/2022    PROTTOTAL 8.0 10/14/2022    ALT 23 10/14/2022    AST 20 10/14/2022    GGT 14 06/17/2016    ALKPHOS 82 10/14/2022    BILITOTAL 0.5 10/14/2022     OTHER:   Lab Results   Component Value Date    A1C 5.3 05/03/2022    JONATHAN 9.1 10/14/2022    TSH 2.46 12/01/2017    T4 1.13 12/01/2017    T3  06/17/2016     CANCELED TOO OLD FOR TESTING MESSAGED DR BARON [id number] AT 1750 BY [facility]       Anesthesia Plan    ASA  Status:  3   NPO Status:  NPO Appropriate    Anesthesia Type: General.   Induction: Intravenous, Propofol.   Maintenance: TIVA.        Consents    Anesthesia Plan(s) and associated risks, benefits, and realistic alternatives discussed. Questions answered and patient/representative(s) expressed understanding.     - Discussed: Risks, Benefits and Alternatives for BOTH SEDATION and the PROCEDURE were discussed     - Discussed with:  Patient, Parent (Mother and/or Father)      - Extended Intubation/Ventilatory Support Discussed: No.      - Patient is DNR/DNI Status: No    Use of blood products discussed: No .     Postoperative Care    Pain management: IV analgesics, Oral pain medications.   PONV prophylaxis: Ondansetron (or other 5HT-3), Dexamethasone or Solumedrol     Comments:                Adalberto Suggs CRNA, APRN TASHIA

## 2022-11-07 ENCOUNTER — ANESTHESIA (OUTPATIENT)
Dept: SURGERY | Facility: CLINIC | Age: 27
End: 2022-11-07
Payer: COMMERCIAL

## 2022-11-07 ENCOUNTER — HOSPITAL ENCOUNTER (OUTPATIENT)
Facility: CLINIC | Age: 27
Discharge: HOME | End: 2022-11-07
Attending: SURGERY | Admitting: SURGERY
Payer: COMMERCIAL

## 2022-11-07 VITALS
BODY MASS INDEX: 30.73 KG/M2 | HEIGHT: 62 IN | WEIGHT: 167 LBS | HEART RATE: 68 BPM | SYSTOLIC BLOOD PRESSURE: 113 MMHG | OXYGEN SATURATION: 100 % | RESPIRATION RATE: 12 BRPM | DIASTOLIC BLOOD PRESSURE: 68 MMHG | TEMPERATURE: 98.8 F

## 2022-11-07 DIAGNOSIS — K80.20 SYMPTOMATIC CHOLELITHIASIS: Primary | ICD-10-CM

## 2022-11-07 PROCEDURE — 710N000012 HC RECOVERY PHASE 2, PER MINUTE: Performed by: SURGERY

## 2022-11-07 PROCEDURE — 370N000017 HC ANESTHESIA TECHNICAL FEE, PER MIN: Performed by: SURGERY

## 2022-11-07 PROCEDURE — 250N000013 HC RX MED GY IP 250 OP 250 PS 637: Performed by: NURSE ANESTHETIST, CERTIFIED REGISTERED

## 2022-11-07 PROCEDURE — 360N000076 HC SURGERY LEVEL 3, PER MIN: Performed by: SURGERY

## 2022-11-07 PROCEDURE — 250N000009 HC RX 250: Performed by: NURSE ANESTHETIST, CERTIFIED REGISTERED

## 2022-11-07 PROCEDURE — 47562 LAPAROSCOPIC CHOLECYSTECTOMY: CPT | Mod: AS | Performed by: PHYSICIAN ASSISTANT

## 2022-11-07 PROCEDURE — 88304 TISSUE EXAM BY PATHOLOGIST: CPT | Mod: 26 | Performed by: PATHOLOGY

## 2022-11-07 PROCEDURE — 258N000003 HC RX IP 258 OP 636: Performed by: NURSE ANESTHETIST, CERTIFIED REGISTERED

## 2022-11-07 PROCEDURE — 250N000009 HC RX 250: Performed by: SURGERY

## 2022-11-07 PROCEDURE — 88304 TISSUE EXAM BY PATHOLOGIST: CPT | Mod: TC | Performed by: SURGERY

## 2022-11-07 PROCEDURE — 710N000009 HC RECOVERY PHASE 1, LEVEL 1, PER MIN: Performed by: SURGERY

## 2022-11-07 PROCEDURE — 250N000011 HC RX IP 250 OP 636: Performed by: SURGERY

## 2022-11-07 PROCEDURE — 47562 LAPAROSCOPIC CHOLECYSTECTOMY: CPT | Performed by: SURGERY

## 2022-11-07 PROCEDURE — 999N000141 HC STATISTIC PRE-PROCEDURE NURSING ASSESSMENT: Performed by: SURGERY

## 2022-11-07 PROCEDURE — 271N000001 HC OR GENERAL SUPPLY NON-STERILE: Performed by: SURGERY

## 2022-11-07 PROCEDURE — 250N000011 HC RX IP 250 OP 636: Performed by: NURSE ANESTHETIST, CERTIFIED REGISTERED

## 2022-11-07 PROCEDURE — 272N000001 HC OR GENERAL SUPPLY STERILE: Performed by: SURGERY

## 2022-11-07 RX ORDER — ONDANSETRON 4 MG/1
4 TABLET, ORALLY DISINTEGRATING ORAL EVERY 30 MIN PRN
Status: DISCONTINUED | OUTPATIENT
Start: 2022-11-07 | End: 2022-11-07 | Stop reason: HOSPADM

## 2022-11-07 RX ORDER — HEPARIN SODIUM 5000 [USP'U]/.5ML
5000 INJECTION, SOLUTION INTRAVENOUS; SUBCUTANEOUS
Status: COMPLETED | OUTPATIENT
Start: 2022-11-07 | End: 2022-11-07

## 2022-11-07 RX ORDER — NALOXONE HYDROCHLORIDE 0.4 MG/ML
0.2 INJECTION, SOLUTION INTRAMUSCULAR; INTRAVENOUS; SUBCUTANEOUS
Status: DISCONTINUED | OUTPATIENT
Start: 2022-11-07 | End: 2022-11-07 | Stop reason: HOSPADM

## 2022-11-07 RX ORDER — NALOXONE HYDROCHLORIDE 0.4 MG/ML
0.4 INJECTION, SOLUTION INTRAMUSCULAR; INTRAVENOUS; SUBCUTANEOUS
Status: DISCONTINUED | OUTPATIENT
Start: 2022-11-07 | End: 2022-11-07 | Stop reason: HOSPADM

## 2022-11-07 RX ORDER — SODIUM CHLORIDE, SODIUM LACTATE, POTASSIUM CHLORIDE, CALCIUM CHLORIDE 600; 310; 30; 20 MG/100ML; MG/100ML; MG/100ML; MG/100ML
INJECTION, SOLUTION INTRAVENOUS CONTINUOUS
Status: DISCONTINUED | OUTPATIENT
Start: 2022-11-07 | End: 2022-11-07 | Stop reason: HOSPADM

## 2022-11-07 RX ORDER — MEPERIDINE HYDROCHLORIDE 25 MG/ML
12.5 INJECTION INTRAMUSCULAR; INTRAVENOUS; SUBCUTANEOUS
Status: DISCONTINUED | OUTPATIENT
Start: 2022-11-07 | End: 2022-11-07 | Stop reason: HOSPADM

## 2022-11-07 RX ORDER — PROPOFOL 10 MG/ML
INJECTION, EMULSION INTRAVENOUS CONTINUOUS PRN
Status: DISCONTINUED | OUTPATIENT
Start: 2022-11-07 | End: 2022-11-07

## 2022-11-07 RX ORDER — DOCUSATE SODIUM 100 MG/1
100 CAPSULE, LIQUID FILLED ORAL 2 TIMES DAILY
Refills: 0 | COMMUNITY
Start: 2022-11-07 | End: 2023-01-10

## 2022-11-07 RX ORDER — OXYCODONE HYDROCHLORIDE 5 MG/1
5 TABLET ORAL EVERY 4 HOURS PRN
Status: DISCONTINUED | OUTPATIENT
Start: 2022-11-07 | End: 2022-11-07 | Stop reason: HOSPADM

## 2022-11-07 RX ORDER — INDOCYANINE GREEN AND WATER 25 MG
2.5 KIT INJECTION ONCE
Status: COMPLETED | OUTPATIENT
Start: 2022-11-07 | End: 2022-11-07

## 2022-11-07 RX ORDER — ACETAMINOPHEN 325 MG/1
975 TABLET ORAL ONCE
Status: COMPLETED | OUTPATIENT
Start: 2022-11-07 | End: 2022-11-07

## 2022-11-07 RX ORDER — GABAPENTIN 300 MG/1
300 CAPSULE ORAL
Status: COMPLETED | OUTPATIENT
Start: 2022-11-07 | End: 2022-11-07

## 2022-11-07 RX ORDER — FENTANYL CITRATE 50 UG/ML
25 INJECTION, SOLUTION INTRAMUSCULAR; INTRAVENOUS EVERY 5 MIN PRN
Status: DISCONTINUED | OUTPATIENT
Start: 2022-11-07 | End: 2022-11-07 | Stop reason: HOSPADM

## 2022-11-07 RX ORDER — CEFAZOLIN SODIUM/WATER 2 G/20 ML
2 SYRINGE (ML) INTRAVENOUS
Status: DISCONTINUED | OUTPATIENT
Start: 2022-11-07 | End: 2022-11-07 | Stop reason: HOSPADM

## 2022-11-07 RX ORDER — HYDROMORPHONE HCL IN WATER/PF 6 MG/30 ML
0.2 PATIENT CONTROLLED ANALGESIA SYRINGE INTRAVENOUS EVERY 5 MIN PRN
Status: DISCONTINUED | OUTPATIENT
Start: 2022-11-07 | End: 2022-11-07 | Stop reason: HOSPADM

## 2022-11-07 RX ORDER — LIDOCAINE HYDROCHLORIDE 10 MG/ML
INJECTION, SOLUTION INFILTRATION; PERINEURAL PRN
Status: DISCONTINUED | OUTPATIENT
Start: 2022-11-07 | End: 2022-11-07

## 2022-11-07 RX ORDER — OXYCODONE HYDROCHLORIDE 5 MG/1
5 TABLET ORAL
Status: CANCELLED | OUTPATIENT
Start: 2022-11-07

## 2022-11-07 RX ORDER — PROPOFOL 10 MG/ML
INJECTION, EMULSION INTRAVENOUS PRN
Status: DISCONTINUED | OUTPATIENT
Start: 2022-11-07 | End: 2022-11-07

## 2022-11-07 RX ORDER — CEFAZOLIN SODIUM/WATER 2 G/20 ML
2 SYRINGE (ML) INTRAVENOUS SEE ADMIN INSTRUCTIONS
Status: DISCONTINUED | OUTPATIENT
Start: 2022-11-07 | End: 2022-11-07 | Stop reason: HOSPADM

## 2022-11-07 RX ORDER — OXYCODONE HYDROCHLORIDE 5 MG/1
5 TABLET ORAL EVERY 6 HOURS PRN
Qty: 12 TABLET | Refills: 0 | Status: SHIPPED | OUTPATIENT
Start: 2022-11-07 | End: 2022-11-10

## 2022-11-07 RX ORDER — FENTANYL CITRATE 50 UG/ML
INJECTION, SOLUTION INTRAMUSCULAR; INTRAVENOUS PRN
Status: DISCONTINUED | OUTPATIENT
Start: 2022-11-07 | End: 2022-11-07

## 2022-11-07 RX ORDER — FENTANYL CITRATE 50 UG/ML
25 INJECTION, SOLUTION INTRAMUSCULAR; INTRAVENOUS
Status: DISCONTINUED | OUTPATIENT
Start: 2022-11-07 | End: 2022-11-07 | Stop reason: HOSPADM

## 2022-11-07 RX ORDER — DEXAMETHASONE SODIUM PHOSPHATE 4 MG/ML
INJECTION, SOLUTION INTRA-ARTICULAR; INTRALESIONAL; INTRAMUSCULAR; INTRAVENOUS; SOFT TISSUE PRN
Status: DISCONTINUED | OUTPATIENT
Start: 2022-11-07 | End: 2022-11-07

## 2022-11-07 RX ORDER — LIDOCAINE 40 MG/G
CREAM TOPICAL
Status: DISCONTINUED | OUTPATIENT
Start: 2022-11-07 | End: 2022-11-07 | Stop reason: HOSPADM

## 2022-11-07 RX ORDER — ONDANSETRON 2 MG/ML
4 INJECTION INTRAMUSCULAR; INTRAVENOUS EVERY 30 MIN PRN
Status: DISCONTINUED | OUTPATIENT
Start: 2022-11-07 | End: 2022-11-07 | Stop reason: HOSPADM

## 2022-11-07 RX ADMIN — LIDOCAINE HYDROCHLORIDE 40 MG: 10 INJECTION, SOLUTION INFILTRATION; PERINEURAL at 12:10

## 2022-11-07 RX ADMIN — SODIUM CHLORIDE, POTASSIUM CHLORIDE, SODIUM LACTATE AND CALCIUM CHLORIDE: 600; 310; 30; 20 INJECTION, SOLUTION INTRAVENOUS at 11:30

## 2022-11-07 RX ADMIN — SUGAMMADEX 200 MG: 100 INJECTION, SOLUTION INTRAVENOUS at 13:10

## 2022-11-07 RX ADMIN — PROPOFOL 200 MCG/KG/MIN: 10 INJECTION, EMULSION INTRAVENOUS at 12:10

## 2022-11-07 RX ADMIN — Medication 2 G: at 11:58

## 2022-11-07 RX ADMIN — OXYCODONE HYDROCHLORIDE 5 MG: 5 TABLET ORAL at 14:44

## 2022-11-07 RX ADMIN — DEXAMETHASONE SODIUM PHOSPHATE 10 MG: 4 INJECTION, SOLUTION INTRA-ARTICULAR; INTRALESIONAL; INTRAMUSCULAR; INTRAVENOUS; SOFT TISSUE at 12:10

## 2022-11-07 RX ADMIN — ACETAMINOPHEN 975 MG: 325 TABLET, FILM COATED ORAL at 11:24

## 2022-11-07 RX ADMIN — GABAPENTIN 300 MG: 300 CAPSULE ORAL at 11:24

## 2022-11-07 RX ADMIN — HEPARIN SODIUM 5000 UNITS: 10000 INJECTION, SOLUTION INTRAVENOUS; SUBCUTANEOUS at 12:25

## 2022-11-07 RX ADMIN — MIDAZOLAM 2 MG: 1 INJECTION INTRAMUSCULAR; INTRAVENOUS at 12:06

## 2022-11-07 RX ADMIN — FENTANYL CITRATE 50 MCG: 50 INJECTION, SOLUTION INTRAMUSCULAR; INTRAVENOUS at 12:10

## 2022-11-07 RX ADMIN — LIDOCAINE HYDROCHLORIDE 0.1 ML: 10 INJECTION, SOLUTION EPIDURAL; INFILTRATION; INTRACAUDAL; PERINEURAL at 11:31

## 2022-11-07 RX ADMIN — FENTANYL CITRATE 50 MCG: 50 INJECTION, SOLUTION INTRAMUSCULAR; INTRAVENOUS at 13:09

## 2022-11-07 RX ADMIN — Medication 2.5 MG: at 11:32

## 2022-11-07 RX ADMIN — ROCURONIUM BROMIDE 50 MG: 50 INJECTION, SOLUTION INTRAVENOUS at 12:10

## 2022-11-07 RX ADMIN — PROPOFOL 150 MG: 10 INJECTION, EMULSION INTRAVENOUS at 12:10

## 2022-11-07 ASSESSMENT — ACTIVITIES OF DAILY LIVING (ADL)
ADLS_ACUITY_SCORE: 35
ADLS_ACUITY_SCORE: 20
ADLS_ACUITY_SCORE: 20

## 2022-11-07 NOTE — ANESTHESIA POSTPROCEDURE EVALUATION
Patient: Camilla Henao    Procedure: Procedure(s):  CHOLECYSTECTOMY, LAPAROSCOPIC       Anesthesia Type:  General    Note:  Disposition: Outpatient   Postop Pain Control: Uneventful            Sign Out: Well controlled pain   PONV: No   Neuro/Psych: Uneventful            Sign Out: Acceptable/Baseline neuro status   Airway/Respiratory: Uneventful            Sign Out: Acceptable/Baseline resp. status   CV/Hemodynamics: Uneventful            Sign Out: Acceptable CV status; No obvious hypovolemia; No obvious fluid overload   Other NRE: NONE   DID A NON-ROUTINE EVENT OCCUR? No           Last vitals:  Vitals Value Taken Time   BP 96/62 11/07/22 1400   Temp 37.1  C (98.7  F) 11/07/22 1337   Pulse 71 11/07/22 1400   Resp 16 11/07/22 1352   SpO2 100 % 11/07/22 1401   Vitals shown include unvalidated device data.    Electronically Signed By: Adalberto Suggs CRNA, APRN CRNA  November 7, 2022  2:02 PM

## 2022-11-07 NOTE — ANESTHESIA CARE TRANSFER NOTE
Patient: Camilla Henao    Procedure: Procedure(s):  CHOLECYSTECTOMY, LAPAROSCOPIC       Diagnosis: Symptomatic cholelithiasis [K80.20]  Diagnosis Additional Information: No value filed.    Anesthesia Type:   General     Note:    Oropharynx: oropharynx clear of all foreign objects and spontaneously breathing  Level of Consciousness: drowsy  Oxygen Supplementation: face mask  Level of Supplemental Oxygen (L/min / FiO2): 5  Independent Airway: airway patency satisfactory and stable  Dentition: dentition unchanged  Vital Signs Stable: post-procedure vital signs reviewed and stable  Report to RN Given: handoff report given  Patient transferred to: PACU    Handoff Report: Identifed the Patient, Identified the Reponsible Provider, Reviewed the pertinent medical history, Discussed the surgical course, Reviewed Intra-OP anesthesia mangement and issues during anesthesia, Set expectations for post-procedure period and Allowed opportunity for questions and acknowledgement of understanding      Vitals:  Vitals Value Taken Time   BP 97/64 11/07/22 1333   Temp     Pulse 77 11/07/22 1335   Resp 15 11/07/22 1335   SpO2 97 % 11/07/22 1335   Vitals shown include unvalidated device data.    Electronically Signed By: Adalberto Suggs CRNA, APRN CRNA  November 7, 2022  1:36 PM

## 2022-11-07 NOTE — OR NURSING
Patient discharged to home at 3:10 PM via wheel chair. Accompanied by mother and father and staff. Discharge instructions reviewed with patient, opportunity offered to ask questions. Prescriptions filled and sent with patient upon discharge. All belongings sent with patient.    Barbara Salazar RN

## 2022-11-07 NOTE — OR NURSING
Data:   Reason for Transport:  PACU criteria met    Camilla BERG Earlene was transported to  via bed at 1400.  Patient was accompanied by Registered Nurse. Equipment used for transport: None. Family was aware of reason for transport: yes    Action:  Report: given to Becca ARCOS RN    Response:  Patient's condition when transferred off unit was stable, VSS denies pain tolerating sips of water and icechips.    Barbara Salazar RN

## 2022-11-07 NOTE — DISCHARGE INSTRUCTIONS
HOME CARE FOLLOWING ABDOMINAL SURGERY    INCISIONAL CARE:  Replace the bandage over your incision (or incisions) until all drainage stops, or if more comfortable to have in place.  If present, leave the steri-strips (white paper tapes) in place for 14 days after surgery.  If you have staples in your incision at the time of discharge, they will be removed at your follow-up appointment.  If Dermabond (a type of skin glue) is present, leave in place until it wears/flakes off.     BATHING:  Avoid baths for 1 week after surgery.  Showers are okay.  You may wash your hair at any time.  Gently pat your incision dry after bathing.    ACTIVITY:  Light Activity -- you may immediately be up and about as tolerated.  Driving -- you may drive when comfortable and off narcotic pain medications (example: Norco, Percocet, Hydrocodone).  Light Work -- resume when comfortable off pain medications.  (If you can drive, you probably can work.)  Strenuous Work/Activity -- limit lifting to 20 pounds for 4 weeks.  Then, progressively increase with time.  Active Sports (running, biking, etc.) -- cautiously resume after 6 weeks.  Most importantly listen to your body.  If an activity causes pain or discomfort please stop and try in a few days or decrease your intensity.    DISCOMFORT:  Use pain medications as prescribed by your surgeon.  Take the pain medication with some food, when possible, to minimize side effects.  Expect gradual improvement.      Narcotic Pain Medications:  Do not drive, make important decisions or operate heavy machinery while on narcotic pain medications.  Narcotic pain medications can be associated with nausea, sleep disturbance, and constipation.  Unless directed otherwise, please take an over the counter stool softener (Colace 100mg twice a day) unless directed otherwise.  As soon as you are able to stop narcotic pain medications the better. Long term use of narcotics is associated with tolerance (the need for more  medication for effect) and potential addiction.    DIET:  Return to diet you were on before surgery, unless you are given specific diet instructions.  Drink plenty of fluids.  While taking pain medications, increase dietary fiber or add a fiber supplementation like Metamucil or Citrucel to help prevent constipation - a possible side effect of pain medications.    NAUSEA:  If nauseated from the anesthetic/pain meds; rest in bed, get up cautiously with assistance, and drink clear liquids (juice, tea, broth).    RETURN APPOINTMENT:  Schedule a follow-up visit 2-3 weeks after discharge from the hospital.  Office Phone: [phone]     CONTACT US IF THE FOLLOWING DEVELOPS:   1. A fever that is above 101     2. If there is a large amount of drainage, bleeding, or swelling.   3. Severe pain that is not relieved by your prescription.   4. Drainage that is thick, cloudy, yellow, green or white.   5. Any other questions not answered by  Frequently Asked Questions  sheet.      FREQUENTLY ASKED QUESTIONS:    Q:  How should my incision look?    A:  Normally your incision will appear slightly swollen with light redness directly along the incision itself as it heals.  It may feel like a bump or ridge as the healing/scarring happens, and over time (3-4 months) this bump or ridge feeling should slowly go away.  In general, clear or pink watery drainage can be normal at first as your incision heals, but should decrease over time.    Q:  How do I know if my incision is infected?  A:  Look at your incision for signs of infection, like redness around the incision spreading to surrounding skin, or drainage of cloudy or foul-smelling drainage.  If you feel warm, check your temperature to see if you are running a fever.    **If any of these things occur, please notify the nurse at our office.  We may need you to come into the office for an incision check.      Q:  How do I take care of my incision?  A:  If you have a dressing in place -  Starting the day after surgery, replace the dressing 1-2 times a day until there is no further drainage from the incision.  At that time, a dressing is no longer needed.  Try to minimize tape on the skin if irritation is occurring at the tape sites.  If you have significant irritation from tape on the skin, please call the office to discuss other method of dressing your incision.    Small pieces of tape called  steri-strips  may be present directly overlying your incision; these may be removed 10 days after surgery unless otherwise specified by your surgeon.  If these tapes start to loosen at the ends, you may trim them back until they fall off or are removed.    A:  If you had  Dermabond  tissue glue used as a dressing (this causes your incision to look shiny with a clear covering over it) - This type of dressing wears off with time and does not require more dressings over the top unless it is draining around the glue as it wears off.  Do not apply ointments or lotions over the incisions until the glue has completely worn off.    Q:  There is a piece of tape or a sticky  lead  still on my skin.  Can I remove this?  A:  Sometimes the sticky  leads  used for monitoring during surgery or for evaluation in the emergency department are not all removed while you are in the hospital.  These sometimes have a tab or metal dot on them.  You can easily remove these on your own, like taking off a band-aid.  If there is a gel substance under the  lead , simply wipe/clean it off with a washcloth or paper towel.      Q:  What can I do to minimize constipation (very hard stools, or lack of stools)?  A:  Stay well hydrated.  Increase your dietary fiber intake or take a fiber supplement -with plenty of water.  Walk around frequently.  You may consider an over-the-counter stool-softener.  Your Pharmacist can assist you with choosing one that is stocked at your pharmacy.  Constipation is also one of the most common side effects of  pain medication.  If you are using pain medication, be pro-active and try to PREVENT problems with constipation by taking the steps above BEFORE constipation becomes a problem.    Q:  What do I do if I need more pain medications?  A:  Call the office to receive refills.  Be aware that certain pain meds cannot be called into a pharmacy and actually require a paper prescription.  A change may be made in your pain med as you progress thru your recovery period or if you have side effects to certain meds.    --Pain meds are NOT refilled after 5pm on weekdays, and NOT AT ALL on the weekends, so please look ahead to prevent problems.      Q:  Why am I having a hard time sleeping now that I am at home?  A:  Many medications you receive while you are in the hospital can impact your sleep for a number of days after your surgery/hospitalization.  Decreased level of activity and naps during the day may also make sleeping at night difficult.  Try to minimize day-time naps, and get up frequently during the day to walk around your home during your recovery time.  Sleep aides may be of some help, but are not recommended for long-term use.      Q:  I am having some back discomfort.  What should I do?  A:  This may be related to certain positioning that was required for your surgery, extended periods of time in bed, or other changes in your overall activity level.  You may try ice, heat, acetaminophen, or ibuprofen to treat this temporarily.  Note that many pain medications have acetaminophen in them and would state this on the prescription bottle.  Be sure not to exceed the maximum of 4000mg per day of acetaminophen.     **If the pain you are having does not resolve, is severe, or is a flare of back pain you have had on other occasions prior to surgery, please contact your primary physician for further recommendations or for an appointment to be examined at their office.    Q:  Why am I having headaches?  A:  Headaches can be caused  by many things:  caffeine withdrawal, use of pain meds, dehydration, high blood pressure, lack of sleep, over-activity/exhaustion, flare-up of usual migraine headaches.  If you feel this is related to muscle tension (a band-like feeling around the head, or a pressure at the low-back of the head) you may try ice or heat to this area.  You may need to drink more fluids (try electrolyte drink like Gatorade), rest, or take your usual migraine medications.   **If your headaches do not resolve, worsen, are accompanied by other symptoms, or if your blood pressure is high, please call your primary physician for recommendation and/or examination.    Q:  I am unable to urinate.  What do I do?  A:  A small percentage of people can have difficulty urinating initially after surgery.  This includes being able to urinate only a very small amount at a time and feeling discomfort or pressure in the very low abdomen.  This is called  urinary retention , and is actually an urgent situation.  Proceed to your nearest Emergency department for evaluation (not an Urgent Care Center).  Sometimes the bladder does not work correctly after certain medications you receive during surgery, or related to certain procedures.  You may need to have a catheter placed until your bladder recovers.  When planning to go to an Emergency department, it may help to call the ER to let them know you are coming in for this problem after a surgery.  This may help you get in quicker to be evaluated.  **If you have symptoms of a urinary tract infection, please contact your primary physician for the proper evaluation and treatment.          If you have other questions, please call the office Monday thru Friday between 8am and 5pm to discuss with the nurse.  # [phone]    There is a surgeon ON CALL on weekday evenings and over the weekend in case of urgent need only, and may be contacted at the same number.    If you are having an emergency, call 911 or proceed  to your nearest emergency department.

## 2022-11-07 NOTE — OP NOTE
Procedure Date: November 7, 2022    Pre-operative Diagnosis: Symptomatic Cholelithiasis    Post-operative Diagnosis:  Symptomatic Cholelithiasis    Procedure Performed: Laparoscopic Cholecystectomy    Surgeon: Jarrett Webb DO    Assistants: Miles Valdes PA-C (Needed for retraction, suction, assistance with closure)    Anesthesia Type: General plus local    Findings: Critical view of safety, cholelithiasis    Estimated Blood Loss: 3 mL           Condition: Stable    Indications:   Ms. Camilla Henao presents with right upper abdominal pain after meals, and characteristics of cholelithiasis on routine ultrasound. She may benefit from cholecystectomy, and was advised of the indications, alternatives, benefits, and risks (including, but not limited to, bleeding, infection, conversion to open surgery, potential for bile leak or bile duct injury, MI, DVT/PE, or death). She understood the information and consented to the aforementioned operative procedure    Procedure: The patient was brought to the Operating Room and placed on the operating table in a supine position. After induction of general endotracheal anesthesia, the abdomen was prepped and draped in the usual sterile fashion. The abdomen was entered through an infraumbilical incision using an open Beti approach, a 5mm trochar was delivered under direct visualization, and the abdomen was insufflated with CO2 gas to a pressure of 15mmHg. Standard laparoscopic cholecystectomy ports were placed in the epigastrium, right midclavicular line and right anterior axillary line. The gallbladder was identified, and noted to be significantly smaller than normal and the fundus retracted superiorly. The cystic duct and artery were carefully dissected to establish the critical view of safety with a single structure emanating from the gallbladder.   The cystic duct was dilated. The cystic duct was clipped three times proximally and singly distally.  The artery was cauterized  during dissection. The gallbladder was then dissected from the liver bed using electrocautery. The gallbladder was retrieved through the epigastric port, and the trochar replaced. Once pneumoperitoneum was reestablished, the gallbladder bed was inspected for bleeding and bile leak and neither were found. The patient was positioned flat, irrigant solution was suctioned free, and pneumoperitoneum was relieved before removing the trocars. The fascia of the infraumbilical port was closed with interrupted 0-Vicryl, and subcutaneous closure was accomplished using 3-0 Vicryl. Wound closure at all incision was accomplished with 4-0 Monocryl, and the sites cleansed and dried prior to application of sterile glue. The patient tolerated the procedure well, was extubated in the Operating Room without incident, and transferred to the recovery room in stable condition.     Jarrett Webb DO on 11/7/2022 at 1:39 PM

## 2022-11-07 NOTE — INTERVAL H&P NOTE
"I have reviewed the surgical (or preoperative) H&P that is linked to this encounter, and examined the patient. There are no significant changes    Clinical Conditions Present on Arrival:  Clinically Significant Risk Factors Present on Admission                    # Obesity: Estimated body mass index is 30.54 kg/m  as calculated from the following:    Height as of this encounter: 1.575 m (5' 2.01\").    Weight as of this encounter: 75.8 kg (167 lb).       "

## 2022-11-09 LAB
PATH REPORT.COMMENTS IMP SPEC: NORMAL
PATH REPORT.COMMENTS IMP SPEC: NORMAL
PATH REPORT.FINAL DX SPEC: NORMAL
PATH REPORT.GROSS SPEC: NORMAL
PATH REPORT.MICROSCOPIC SPEC OTHER STN: NORMAL
PATH REPORT.RELEVANT HX SPEC: NORMAL
PHOTO IMAGE: NORMAL

## 2022-11-22 ENCOUNTER — OFFICE VISIT (OUTPATIENT)
Dept: SURGERY | Facility: CLINIC | Age: 27
End: 2022-11-22
Payer: COMMERCIAL

## 2022-11-22 ENCOUNTER — IMMUNIZATION (OUTPATIENT)
Dept: FAMILY MEDICINE | Facility: CLINIC | Age: 27
End: 2022-11-22
Payer: COMMERCIAL

## 2022-11-22 VITALS
HEIGHT: 62 IN | TEMPERATURE: 98.6 F | WEIGHT: 167.11 LBS | BODY MASS INDEX: 30.75 KG/M2 | HEART RATE: 73 BPM | SYSTOLIC BLOOD PRESSURE: 117 MMHG | DIASTOLIC BLOOD PRESSURE: 74 MMHG

## 2022-11-22 DIAGNOSIS — Z90.49 S/P LAPAROSCOPIC CHOLECYSTECTOMY: Primary | ICD-10-CM

## 2022-11-22 PROCEDURE — 91312 COVID-19 VACCINE BIVALENT BOOSTER 12+ (PFIZER): CPT

## 2022-11-22 PROCEDURE — 0124A COVID-19 VACCINE BIVALENT BOOSTER 12+ (PFIZER): CPT

## 2022-11-22 PROCEDURE — 99024 POSTOP FOLLOW-UP VISIT: CPT | Performed by: SURGERY

## 2022-11-22 NOTE — PROGRESS NOTES
"General Surgery Post Op    Pt returns for follow up visit s/p laparoscopic cholecystectomy on 11/7/22.    Patient has been doing well, tolerating diet. Bowels moving well. Pain controlled. No issues with wound healing/redness/drainage. No fevers.      Physical exam: Vitals: /74 (BP Location: Right arm, Patient Position: Sitting, Cuff Size: Adult Regular)   Pulse 73   Temp 98.6  F (37  C) (Tympanic)   Ht 1.575 m (5' 2.01\")   Wt 75.8 kg (167 lb 1.7 oz)   BMI 30.56 kg/m    BMI= Body mass index is 30.56 kg/m .    Exam:  Constitutional: healthy, alert and no distress  Cardiovascular: negative  Respiratory: negative  Gastrointestinal: Abdomen soft, non-tender. BS normal. No masses, organomegaly  Incision C/D/I    Path:  Gallbladder: Laparoscopic cholecystectomy:  - Chronic cholecystitis with cholelithiasis          Assessment:     ICD-10-CM    1. S/P laparoscopic cholecystectomy  Z90.49         Plan: Camilla Henao was seen for follow-up after laparoscopic cholecystectomy.  Patient is doing well and recovering without issue at this time.  We reviewed the pathology which was benign.  We discussed routine post-operative care of wounds including avoiding sun exposure to wounds to limit scarring, no need for overlying ointments, and weight restrictions going forward.  Patient was instructed to call with any questions or concerns.  Camilla Henao can follow-up on an as needed basis.    Jarrett Webb, DO on 11/22/2022 at 8:31 AM      "

## 2022-11-22 NOTE — NURSING NOTE
"Initial /74 (BP Location: Right arm, Patient Position: Sitting, Cuff Size: Adult Regular)   Pulse 73   Temp 98.6  F (37  C) (Tympanic)   Ht 1.575 m (5' 2.01\")   Wt 75.8 kg (167 lb 1.7 oz)   BMI 30.56 kg/m   Estimated body mass index is 30.56 kg/m  as calculated from the following:    Height as of this encounter: 1.575 m (5' 2.01\").    Weight as of this encounter: 75.8 kg (167 lb 1.7 oz). .    Dorina Hayden MA    "

## 2022-11-22 NOTE — LETTER
"    11/22/2022         RE: Camilla Henao  [address]  [facility] [address]        Dear Colleague,    Thank you for referring your patient, Camilla Henao, to the [facility]. Please see a copy of my visit note below.    General Surgery Post Op    Pt returns for follow up visit s/p laparoscopic cholecystectomy on 11/7/22.    Patient has been doing well, tolerating diet. Bowels moving well. Pain controlled. No issues with wound healing/redness/drainage. No fevers.      Physical exam: Vitals: /74 (BP Location: Right arm, Patient Position: Sitting, Cuff Size: Adult Regular)   Pulse 73   Temp 98.6  F (37  C) (Tympanic)   Ht 1.575 m (5' 2.01\")   Wt 75.8 kg (167 lb 1.7 oz)   BMI 30.56 kg/m    BMI= Body mass index is 30.56 kg/m .    Exam:  Constitutional: healthy, alert and no distress  Cardiovascular: negative  Respiratory: negative  Gastrointestinal: Abdomen soft, non-tender. BS normal. No masses, organomegaly  Incision C/D/I    Path:  Gallbladder: Laparoscopic cholecystectomy:  - Chronic cholecystitis with cholelithiasis          Assessment:     ICD-10-CM    1. S/P laparoscopic cholecystectomy  Z90.49         Plan: Camilla Henao was seen for follow-up after laparoscopic cholecystectomy.  Patient is doing well and recovering without issue at this time.  We reviewed the pathology which was benign.  We discussed routine post-operative care of wounds including avoiding sun exposure to wounds to limit scarring, no need for overlying ointments, and weight restrictions going forward.  Patient was instructed to call with any questions or concerns.  Camilla Henao can follow-up on an as needed basis.    Jarrett Webb, DO on 11/22/2022 at 8:31 AM          Again, thank you for allowing me to participate in the care of your patient.        Sincerely,        Jarrett Webb, DO    "

## 2023-01-10 ENCOUNTER — OFFICE VISIT (OUTPATIENT)
Dept: FAMILY MEDICINE | Facility: CLINIC | Age: 28
End: 2023-01-10
Payer: COMMERCIAL

## 2023-01-10 VITALS
HEIGHT: 63 IN | WEIGHT: 162 LBS | BODY MASS INDEX: 28.7 KG/M2 | TEMPERATURE: 96.3 F | RESPIRATION RATE: 14 BRPM | HEART RATE: 84 BPM | DIASTOLIC BLOOD PRESSURE: 78 MMHG | SYSTOLIC BLOOD PRESSURE: 120 MMHG | OXYGEN SATURATION: 98 %

## 2023-01-10 DIAGNOSIS — H61.23 BILATERAL IMPACTED CERUMEN: ICD-10-CM

## 2023-01-10 DIAGNOSIS — R42 VERTIGO: Primary | ICD-10-CM

## 2023-01-10 PROCEDURE — 99213 OFFICE O/P EST LOW 20 MIN: CPT | Mod: 25 | Performed by: NURSE PRACTITIONER

## 2023-01-10 PROCEDURE — 69210 REMOVE IMPACTED EAR WAX UNI: CPT | Mod: 50 | Performed by: NURSE PRACTITIONER

## 2023-01-10 RX ORDER — MECLIZINE HYDROCHLORIDE 25 MG/1
12.5-25 TABLET ORAL 3 TIMES DAILY PRN
Qty: 30 TABLET | Refills: 1 | Status: SHIPPED | OUTPATIENT
Start: 2023-01-10 | End: 2023-02-07

## 2023-01-10 ASSESSMENT — PAIN SCALES - GENERAL: PAINLEVEL: NO PAIN (0)

## 2023-01-10 NOTE — PATIENT INSTRUCTIONS
Apply ear wax drops to both ears weekly starting in 1 week.  Use meclizine as needed for dizziness.  Take Zyrtec 10 mg 1 hour before bed for at least a week.  Continue to push fluids.  Make appointment with Physical Therapy for vestibular exercises to reduce dizziness if no improvement.

## 2023-01-10 NOTE — PROGRESS NOTES
Assessment & Plan     Vertigo  Most likely BPPV.  Treating symptoms with antivert as needed.  Recommend Zyrtec 10 mg daily at bedtime for a week due to congestion since this may be cause. Referral to Physical Therapy for vestibular exercises ordered if no improvement.  No red flag concerns on exam.  Recommend follow-up in clinic as needed for persistent or worsening symptoms.  - meclizine (ANTIVERT) 25 MG tablet; Take 0.5-1 tablets (12.5-25 mg) by mouth 3 times daily as needed for dizziness  - Physical Therapy Referral; Future    Bilateral impacted cerumen  Cleaned ears today since there was moderate wax in the ears with rinsing and currette in left ear to remove wax.  There was a small amount of bleeding in the canal from rinsing in the right ear but otherwise TMs bilaterally were normal.  I recommend starting weekly ear wax drops to keep wax moving out of the ears starting next week.  Follow-up as needed.    See Patient Instructions    Return if symptoms worsen or fail to improve.    Nicolle Beckett NP  [facility] is a 27 year old accompanied by her mother, presenting for the following health issues:  Dizziness      History of Present Illness       Reason for visit:  Periodic feelings of lightheadedness  Symptom onset:  More than a month  Symptoms include:  Feeling lightheaded when at work  Symptom intensity:  Moderate  Symptom progression:  Staying the same  Had these symptoms before:  No  What makes it worse:  No  What makes it better:  I just let it go away    She eats 0-1 servings of fruits and vegetables daily.She consumes 1 sweetened beverage(s) daily.She exercises with enough effort to increase her heart rate 9 or less minutes per day.  She exercises with enough effort to increase her heart rate 3 or less days per week.   She is taking medications regularly.     Review of Systems   CONSTITUTIONAL: NEGATIVE for fever, chills, change in weight  ENT/MOUTH:  "POSITIVE for vertigo and hx of ear wax  RESP: NEGATIVE for significant cough or SOB  CV: NEGATIVE for chest pain, palpitations or peripheral edema  NEURO: NEGATIVE for weakness, dizziness or paresthesias and vertigo more when up moving around and lasts for a minute with rest will improve.  Has been occurring intermittently since September after her COVID 19 diagnosis.  She has see psychiatry with no side effects to medications noted, eye doctor exam was normal, recent cardiology workup was normal and no heavy periods noted.  She is trying to push fluids.  PSYCHIATRIC: NEGATIVE for changes in mood or affect  ROS otherwise negative      Objective    /78   Pulse 84   Temp (!) 96.3  F (35.7  C) (Tympanic)   Resp 14   Ht 1.588 m (5' 2.52\")   Wt 73.5 kg (162 lb)   SpO2 98%   BMI 29.14 kg/m    Body mass index is 29.14 kg/m .  Physical Exam   GENERAL: healthy, alert and no distress  EYES: Eyes grossly normal to inspection, PERRL and conjunctivae and sclerae normal  HENT: normal cephalic/atraumatic, both ears: occluded with wax, nose and mouth without ulcers or lesions, oropharynx clear and oral mucous membranes moist  NEURO: Normal strength and tone, mentation intact, cranial nerves 2-12 intact, Romberg normal, Past point normal and [place] Hallpike showed a single nystagmus to the left otherwise negative.  PSYCH: mentation appears normal, affect normal/bright        "

## 2023-01-25 ENCOUNTER — HOSPITAL ENCOUNTER (OUTPATIENT)
Dept: PHYSICAL THERAPY | Facility: CLINIC | Age: 28
Setting detail: THERAPIES SERIES
Discharge: HOME | End: 2023-01-25
Attending: NURSE PRACTITIONER
Payer: COMMERCIAL

## 2023-01-25 DIAGNOSIS — R42 VERTIGO: ICD-10-CM

## 2023-01-25 PROCEDURE — 97112 NEUROMUSCULAR REEDUCATION: CPT | Mod: GP | Performed by: PHYSICAL THERAPIST

## 2023-01-25 PROCEDURE — 97162 PT EVAL MOD COMPLEX 30 MIN: CPT | Mod: GP | Performed by: PHYSICAL THERAPIST

## 2023-01-25 NOTE — PROGRESS NOTES
[facility]                                                                                   OUTPATIENT PHYSICAL THERAPY FUNCTIONAL EVALUATION  PLAN OF TREATMENT FOR OUTPATIENT REHABILITATION  (COMPLETE FOR INITIAL CLAIMS ONLY)  Patient's Last Name, First Name, M.I.  YOB: 1995  Camilla Henao     Provider's Name   [facility]   Medical Record No.  [id number]     Start of Care Date:  01/25/23   Onset Date:  09/15/22   Type:     _X__PT   ____OT  ____SLP Medical Diagnosis:  vertigo     PT Diagnosis:  motion sensitivity Visits from SOC:  1                              __________________________________________________________________________________  Plan of Treatment/Functional Goals:  balance training, neuromuscular re-education           GOALS  1  pt will report no sx/episodes while at work in 6wk  03/08/23    2  pt will be aeblt odo al regular activity w/o sx in 8wk  03/22/23                Therapy Frequency:  1 time/week   Predicted Duration of Therapy Intervention:  see every other week  x 8wk    Kris Hoenk, PT                                    I CERTIFY THE NEED FOR THESE SERVICES FURNISHED UNDER        THIS PLAN OF TREATMENT AND WHILE UNDER MY CARE     (Physician co-signature of this document indicates review and certification of the therapy plan).                Certification Date From:  01/25/23   Certification Date To:  03/22/23    Referring Provider:  Kathi Beckett NP    Initial Assessment  See Epic Evaluation- Start of Care Date: 01/25/23

## 2023-01-25 NOTE — PROGRESS NOTES
PHYSICAL THERAPY EVALUATION    01/25/23 0800   Quick Adds   Quick Adds Certification;Vestibular Eval   Type of Visit Initial OP PT Evaluation   General Information   Start of Care Date 01/25/23   Referring Physician Kathi Beckett NP   Orders Evaluate and Treat as Indicated   Order Date 01/10/23   Medical Diagnosis vertigo   Onset of illness/injury or Date of Surgery 09/15/22   Surgical/Medical history reviewed Yes  (heart valve replaced, tetrology of fallot)   Pertinent history of current problem (include personal factors and/or comorbidities that impact the POC) Lightheaded, feels like she may fall over, just last 20-30 seconds. Somedays worse than others. Sx since about Sept 2022, did have covid August, but no signig dizziness with it. Had cardiology check up in OCT/Nov, so they didn't think it was cardiac. Then had gall bladder out in NOv. Had ear cleaned out, didn't change, now taking allergy med daily, helps some. Can happen randomly through the day, can be sitting or standing . Pt's mother present through entire session, helping some with history   Patient role/Employment history Employed  ([employer], 25-30 hours/wk)   Patient/Family Goals Statement get rid of sx   Fall Risk Screen   Fall screen completed by PT   Have you fallen 2 or more times in the past year? No   Have you fallen and had an injury in the past year? No   Is patient a fall risk? No   Abuse Screen (yes response referral indicated)   Physical Signs of Abuse Present no   Vitals Signs   Vital Signs Comments orthostaitc BP supine 117/78, sitting 119/78, standing 115/82   Posture   Posture Forward head position;Kyphosis   Gait   Gait Comments gait is normal for pt, gait with  horiz head turns normal, verticla turns reporting increased dizziness/moving sensation, did slow pace slightly,no signif path deviation, pivot turn normal   Balance   Balance Comments standing balance FA EO horiz and vert head turns x10, mild moving sensation vertical  turns worse, FA 5-6inches EC with head turns, no LOB, moving sensation with both, vertical worse   Balance Special Tests   Balance Special Tests Modified CTSIB Conditions   Balance Special Tests Modified CTSIB Conditions   Condition 1, seconds 30 Seconds   Condition 2, seconds 30 Seconds  (min/mod sway)   Condition 4, seconds 30 Seconds   Condition 5, seconds 5 Seconds  (feet apart 3inch , FA 6 inches,  both approx 5 sec)   Cervicogenic Screen   Neck ROM WFL   Transverse Ligament Test Normal   Oculomotor Exam   Smooth Pursuit Normal   Smooth Pursuit Comment able to track, reporting increased moving sensation   Saccades Abnormal   Saccades Comments ?? slow to L, little sx   VOR Normal   VOR Comments able to maintian gaze, less sx than tracking   Rapid Head Thrust Comments unable to relax head well   Other Oculomotor Exam Comments pt sits with head resting position slightly SB R, denies and double vision , or difference in sx when positioning head vertical   Infrared Goggle Exam or Frenzel Lense Exam   Vestibular Suppressant in Last 24 Hours? No   Exam completed with Infrared Goggles   Spontaneous Nystagmus Negative   Gaze Evoked Nystagmus Other;Negative   Gaze Evoked Nystagmus comments 2-3 downward/ L diagonal when looking upward   Head Shake Horizontal Nystagmus Negative   [place]-Hallpike (right) Negative   [place]-Hallpike (right) comments lightheaded upon sitting   [place]-Hallpike (Left) Negative   [place]-Hallpike (left) comments mild lightheaded upon sitting   Planned Therapy Interventions   Planned Therapy Interventions balance training;neuromuscular re-education   Clinical Impression   Criteria for Skilled Therapeutic Interventions Met yes, treatment indicated   PT Diagnosis motion sensitivity   Functional limitations due to impairments balance, vestibular deficits, unknwon cause, possibly  covid related sx   Clinical Presentation Evolving/Changing   Clinical Presentation Rationale ehart condition, pulmonary function,  vestibular   Clinical Decision Making (Complexity) Moderate complexity   Therapy Frequency 1 time/week   Predicted Duration of Therapy Intervention (days/wks) see every other week  x 8wk   Risk & Benefits of therapy have been explained Yes   Patient, Family & other staff in agreement with plan of care Yes   Education Assessment   Preferred Learning Style Listening;Demonstration   Barriers to Learning No barriers   GOALS   PT Eval Goals 1;2   Goal 1   Goal Identifier 1   Goal Description pt will report no sx/episodes while at work in 6wk   Target Date 03/08/23   Goal 2   Goal Identifier 2   Goal Description pt will be aeblt odo al regular activity w/o sx in 8wk   Target Date 03/22/23   Total Evaluation Time   PT Eval, Moderate Complexity Minutes (85510) 25   Therapy Certification   Certification date from 01/25/23   Certification date to 03/22/23   Medical Diagnosis vertigo   [facility]  Kris Hoenk  PT  [facility]  [address] [place] [address]  [email or web address]   Office: [phone]  Voicemail: [phone]

## 2023-01-31 ASSESSMENT — ENCOUNTER SYMPTOMS
SHORTNESS OF BREATH: 0
DYSURIA: 0
HEMATOCHEZIA: 0
FREQUENCY: 0
PARESTHESIAS: 0
FEVER: 0
ABDOMINAL PAIN: 0
HEADACHES: 0
CONSTIPATION: 0
NAUSEA: 0
CHILLS: 0
ARTHRALGIAS: 0
NERVOUS/ANXIOUS: 0
JOINT SWELLING: 0
SORE THROAT: 0
EYE PAIN: 0
HEARTBURN: 0
DIARRHEA: 0
COUGH: 0
MYALGIAS: 0
BREAST MASS: 0
DIZZINESS: 1
WEAKNESS: 0
HEMATURIA: 0
PALPITATIONS: 0

## 2023-02-07 ENCOUNTER — OFFICE VISIT (OUTPATIENT)
Dept: FAMILY MEDICINE | Facility: CLINIC | Age: 28
End: 2023-02-07
Payer: COMMERCIAL

## 2023-02-07 VITALS
TEMPERATURE: 97.2 F | HEIGHT: 63 IN | RESPIRATION RATE: 20 BRPM | DIASTOLIC BLOOD PRESSURE: 80 MMHG | HEART RATE: 99 BPM | OXYGEN SATURATION: 98 % | BODY MASS INDEX: 28.31 KG/M2 | SYSTOLIC BLOOD PRESSURE: 132 MMHG | WEIGHT: 159.8 LBS

## 2023-02-07 DIAGNOSIS — Z00.00 ROUTINE GENERAL MEDICAL EXAMINATION AT A HEALTH CARE FACILITY: Primary | ICD-10-CM

## 2023-02-07 DIAGNOSIS — F20.89 OTHER SCHIZOPHRENIA (H): ICD-10-CM

## 2023-02-07 DIAGNOSIS — Z13.21 ENCOUNTER FOR VITAMIN DEFICIENCY SCREENING: ICD-10-CM

## 2023-02-07 DIAGNOSIS — N93.8 DUB (DYSFUNCTIONAL UTERINE BLEEDING): ICD-10-CM

## 2023-02-07 PROCEDURE — 90677 PCV20 VACCINE IM: CPT | Performed by: FAMILY MEDICINE

## 2023-02-07 PROCEDURE — 99395 PREV VISIT EST AGE 18-39: CPT | Mod: 25 | Performed by: FAMILY MEDICINE

## 2023-02-07 PROCEDURE — 90471 IMMUNIZATION ADMIN: CPT | Performed by: FAMILY MEDICINE

## 2023-02-07 PROCEDURE — 99213 OFFICE O/P EST LOW 20 MIN: CPT | Mod: 25 | Performed by: FAMILY MEDICINE

## 2023-02-07 RX ORDER — NORGESTREL-ETHINYL ESTRADIOL 0.3-0.03MG
TABLET ORAL
Qty: 112 TABLET | Refills: 4 | Status: SHIPPED | OUTPATIENT
Start: 2023-02-07 | End: 2024-02-14

## 2023-02-07 ASSESSMENT — ENCOUNTER SYMPTOMS
WEAKNESS: 0
MYALGIAS: 0
DYSURIA: 0
FREQUENCY: 0
BREAST MASS: 0
FEVER: 0
ABDOMINAL PAIN: 0
CHILLS: 0
SHORTNESS OF BREATH: 0
DIARRHEA: 0
JOINT SWELLING: 0
HEMATURIA: 0
EYE PAIN: 0
CONSTIPATION: 0
COUGH: 0
NERVOUS/ANXIOUS: 0
SORE THROAT: 0
HEMATOCHEZIA: 0
HEARTBURN: 0
PALPITATIONS: 0
NAUSEA: 0
HEADACHES: 0
PARESTHESIAS: 0
ARTHRALGIAS: 0
DIZZINESS: 1

## 2023-02-07 ASSESSMENT — PAIN SCALES - GENERAL: PAINLEVEL: NO PAIN (0)

## 2023-02-07 NOTE — PROGRESS NOTES
SUBJECTIVE:   CC: Camilla is an 27 year old who presents for preventive health visit.   Patient has been advised of split billing requirements and indicates understanding: Yes  Healthy Habits:     Getting at least 3 servings of Calcium per day:  NO    Bi-annual eye exam:  Yes    Dental care twice a year:  Yes    Sleep apnea or symptoms of sleep apnea:  None    Diet:  Regular (no restrictions)    Frequency of exercise:  1 day/week    Duration of exercise:  Less than 15 minutes    Taking medications regularly:  Yes    Medication side effects:  None    PHQ-2 Total Score: 0    Additional concerns today:  No    ?blood work  ?pneumonia immunization    Medication Followup of Birth control    Taking Medication as prescribed: yes    Side Effects:  None    Medication Helping Symptoms:  yes  - Mom does want to discuss that Camilla does get spotting 1-2 days, no cramping, from time to time. Camilla does take her birth control pill continuously.    Today's PHQ-2 Score:   PHQ-2 ( 1999 Pfizer) 1/31/2023   Q1: Little interest or pleasure in doing things 0   Q2: Feeling down, depressed or hopeless 0   PHQ-2 Score 0   Q1: Little interest or pleasure in doing things Not at all   Q2: Feeling down, depressed or hopeless Not at all   PHQ-2 Score 0       Have you ever done Advance Care Planning? (For example, a Health Directive, POLST, or a discussion with a medical provider or your loved ones about your wishes): No, advance care planning information given to patient to review.  Advanced care planning was discussed at today's visit.    Social History     Tobacco Use     Smoking status: Never     Smokeless tobacco: Never   Substance Use Topics     Alcohol use: No     If you drink alcohol do you typically have >3 drinks per day or >7 drinks per week? No    Alcohol Use 1/31/2023   Prescreen: >3 drinks/day or >7 drinks/week? No   Prescreen: >3 drinks/day or >7 drinks/week? -   No flowsheet data found.    Reviewed orders with patient.   Reviewed health maintenance and updated orders accordingly - Yes  BP Readings from Last 3 Encounters:   02/07/23 132/80   01/10/23 120/78   11/22/22 117/74    Wt Readings from Last 3 Encounters:   02/07/23 72.5 kg (159 lb 12.8 oz)   01/10/23 73.5 kg (162 lb)   11/22/22 75.8 kg (167 lb 1.7 oz)                    Breast Cancer Screening:    Breast CA Risk Assessment (FHS-7) 10/28/2022   Do you have a family history of breast, colon, or ovarian cancer? No / Unknown         Patient under 40 years of age: Routine Mammogram Screening not recommended.   Pertinent mammograms are reviewed under the imaging tab.    History of abnormal Pap smear: NO - age 21-29 PAP every 3 years recommended     Reviewed and updated as needed this visit by clinical staff   Tobacco  Allergies  Meds              Reviewed and updated as needed this visit by Provider                     Review of Systems   Constitutional: Negative for chills and fever.   HENT: Negative for congestion, ear pain, hearing loss and sore throat.    Eyes: Negative for pain and visual disturbance.   Respiratory: Negative for cough and shortness of breath.    Cardiovascular: Negative for chest pain, palpitations and peripheral edema.   Gastrointestinal: Negative for abdominal pain, constipation, diarrhea, heartburn, hematochezia and nausea.   Breasts:  Negative for tenderness, breast mass and discharge.   Genitourinary: Negative for dysuria, frequency, genital sores, hematuria, pelvic pain, urgency, vaginal bleeding and vaginal discharge.   Musculoskeletal: Negative for arthralgias, joint swelling and myalgias.   Skin: Negative for rash.   Neurological: Positive for dizziness. Negative for weakness, headaches and paresthesias.   Psychiatric/Behavioral: Negative for mood changes. The patient is not nervous/anxious.           OBJECTIVE:   /80 (BP Location: Right arm, Patient Position: Sitting, Cuff Size: Adult Regular)   Pulse 99   Temp 97.2  F (36.2  C)  "(Tympanic)   Resp 20   Ht 1.594 m (5' 2.76\")   Wt 72.5 kg (159 lb 12.8 oz)   SpO2 98%   BMI 28.53 kg/m    Physical Exam  GENERAL: healthy, alert and no distress  EYES: Eyes grossly normal to inspection, PERRL and conjunctivae and sclerae normal  HENT: ear canals and TM's normal, nose and mouth without ulcers or lesions  NECK: no adenopathy, no asymmetry, masses, or scars and thyroid normal to palpation  RESP: lungs clear to auscultation - no rales, rhonchi or wheezes  CV: regular rate and rhythm, loud murmur  ABDOMEN: soft, nontender, no hepatosplenomegaly, no masses and bowel sounds normal  MS: no gross musculoskeletal defects noted, no edema  SKIN: no suspicious lesions or rashes  NEURO: Normal strength and tone, mentation intact and speech normal  PSYCH: mentation appears normal, affect normal/bright    Diagnostic Test Results:  Labs reviewed in Epic    ASSESSMENT/PLAN:   Camilla was seen today for physical.    Diagnoses and all orders for this visit:    Routine general medical examination at a health care facility  Declines breast exam and pap.    DUB (dysfunctional uterine bleeding): well controlled with continuous pill, some spotting, discussed taking placebo every 3 months to prevent break through bleeding, but then may get cramps again, could try if interested to stop the break through bleeding.  -     norgestrel-ethinyl estradiol (CRYSELLE-28) 0.3-30 MG-MCG tablet; TAKE ONE ACTIVE TABLET BY MOUTH DAILY CONTINUOUSLY.    Other schizophrenia (H): following with psychiatry every 6months, yearly labs in May  Known issue that I take into account for their medical decisions, no current exacerbations or new concerns      Encounter for vitamin deficiency screening  -     Vitamin D Deficiency; Future    Other orders  -     REVIEW OF HEALTH MAINTENANCE PROTOCOL ORDERS        Patient has been advised of split billing requirements and indicates understanding: Yes      COUNSELING:  Reviewed preventive health " "counseling, as reflected in patient instructions       Regular exercise       Healthy diet/nutrition       Vision screening       Immunizations    Vaccinated for: Pneumococcal             Contraception      BMI:   Estimated body mass index is 28.53 kg/m  as calculated from the following:    Height as of this encounter: 1.594 m (5' 2.76\").    Weight as of this encounter: 72.5 kg (159 lb 12.8 oz).   Weight management plan: Discussed healthy diet and exercise guidelines      She reports that she has never smoked. She has never used smokeless tobacco.      Ha Velasco MD  [facility]  "

## 2023-02-07 NOTE — PATIENT INSTRUCTIONS
Pneumonia 19 immunization    Goal: eating one fruit a day, instead of pop.    Ok to try the active pill continuously for 2-3 months then a short break 3-7 days off the pill so that spotting/bleeding is scheduled to try to prevent the break though spotting.      This is a preventative visit and any additional concerns or chronic disease management including medication refills addressed today could be charged additionally.    Preventative visits screen for diseases prior to they occur.  They do not cover for any new diagnosis or chronic disease management.     If you have questions regarding your coverage please check with your insurance provider.  At [facility] [facility] we need to code correctly to be in compliance with all insurance companies.    Preventive Health Recommendations  Female Ages 26 - 39  Yearly exam:   See your health care provider every year in order to  Review health changes.   Discuss preventive care.    Review your medicines if you your doctor has prescribed any.    Until age 30: Get a Pap test every three years (more often if you have had an abnormal result).    After age 30: Talk to your doctor about whether you should have a Pap test every 3 years or have a Pap test with HPV screening every 5 years.   You do not need a Pap test if your uterus was removed (hysterectomy) and you have not had cancer.  You should be tested each year for STDs (sexually transmitted diseases), if you're at risk.   Talk to your provider about how often to have your cholesterol checked.  If you are at risk for diabetes, you should have a diabetes test (fasting glucose).  Shots: Get a flu shot each year. Get a tetanus shot every 10 years.   Nutrition:   Eat at least 5 servings of fruits and vegetables each day.  Eat whole-grain bread, whole-wheat pasta and brown rice instead of white grains and rice.  Get adequate Calcium and Vitamin D.     Lifestyle  Exercise at least 150 minutes a week (30 minutes a day, 5 days of the week). This  will help you control your weight and prevent disease.  Limit alcohol to one drink per day.  No smoking.   Wear sunscreen to prevent skin cancer.  See your dentist every six months for an exam and cleaning.

## 2023-02-13 ENCOUNTER — TELEPHONE (OUTPATIENT)
Dept: FAMILY MEDICINE | Facility: CLINIC | Age: 28
End: 2023-02-13
Payer: COMMERCIAL

## 2023-02-13 NOTE — TELEPHONE ENCOUNTER
Patient Quality Outreach    Patient is due for the following:   Cervical Cancer Screening - PAP Needed    Next Steps:   pt to schedule    Type of outreach:    Sent letter.      Questions for provider review:    None     Alex Owens

## 2023-02-13 NOTE — LETTER
[facility]  [address] [facility]  [facility] [address]  Phone: [phone]    February 13, 2023    To  Camilla Henao  [address]   [facility] [address]    Your team at [facility] cares about your health. We have reviewed your chart and based on our findings; we are making the following recommendations to better manage your health.     You are in particular need of attention regarding the following:     Schedule a primary care office visit with your provider for a Pap Smear to screen for Cervical Cancer.    If you have already completed these items, please contact the clinic via phone or   [employer] so your care team can review and update your records. Thank you for   choosing [facility] for your healthcare needs. For any questions,   concerns, or to schedule an appointment please contact our clinic.    Healthy Regards,      Your [facility] Care Team

## 2023-02-24 NOTE — PROGRESS NOTES
Discharge Note -Physical Therapy    NAME:  Camilla Henao  MRN:   [id number]    S:    Pt did not follow up for therapy as recommended.    O:  Objective information is not available as pt has not returned for therapy.  Last objective information or progress note will serve as final entry.    A:   Pt did not return for further treatment.    Status of goals is unknown due to lack of followup by patient.    P:  Discharge from PT this date.      [facility]  Kris Hoenk  PT  [facility]  [address] [place] [address]  [email or web address]   Office: [phone]  Voicemail: [phone]

## 2023-02-28 ENCOUNTER — DOCUMENTATION ONLY (OUTPATIENT)
Dept: OTHER | Facility: CLINIC | Age: 28
End: 2023-02-28
Payer: COMMERCIAL

## 2023-05-02 ENCOUNTER — LAB (OUTPATIENT)
Dept: LAB | Facility: CLINIC | Age: 28
End: 2023-05-02
Payer: COMMERCIAL

## 2023-05-02 DIAGNOSIS — Z79.899 ENCOUNTER FOR LONG-TERM (CURRENT) USE OF MEDICATIONS: ICD-10-CM

## 2023-05-02 DIAGNOSIS — F41.1 GENERALIZED ANXIETY DISORDER: Primary | ICD-10-CM

## 2023-05-02 DIAGNOSIS — Z13.21 ENCOUNTER FOR VITAMIN DEFICIENCY SCREENING: ICD-10-CM

## 2023-05-02 LAB
ALBUMIN SERPL BCG-MCNC: 4.3 G/DL (ref 3.5–5.2)
ALP SERPL-CCNC: 67 U/L (ref 35–104)
ALT SERPL W P-5'-P-CCNC: 14 U/L (ref 10–35)
ANION GAP SERPL CALCULATED.3IONS-SCNC: 6 MMOL/L (ref 7–15)
AST SERPL W P-5'-P-CCNC: 19 U/L (ref 10–35)
BASOPHILS # BLD AUTO: 0 10E3/UL (ref 0–0.2)
BASOPHILS NFR BLD AUTO: 0 %
BILIRUB SERPL-MCNC: 0.5 MG/DL
BUN SERPL-MCNC: 10.4 MG/DL (ref 6–20)
CALCIUM SERPL-MCNC: 9 MG/DL (ref 8.6–10)
CHLORIDE SERPL-SCNC: 106 MMOL/L (ref 98–107)
CHOLEST SERPL-MCNC: 180 MG/DL
CREAT SERPL-MCNC: 0.77 MG/DL (ref 0.51–0.95)
DEPRECATED CALCIDIOL+CALCIFEROL SERPL-MC: 8 UG/L (ref 20–75)
DEPRECATED HCO3 PLAS-SCNC: 27 MMOL/L (ref 22–29)
EOSINOPHIL # BLD AUTO: 0.1 10E3/UL (ref 0–0.7)
EOSINOPHIL NFR BLD AUTO: 2 %
ERYTHROCYTE [DISTWIDTH] IN BLOOD BY AUTOMATED COUNT: 12.5 % (ref 10–15)
GFR SERPL CREATININE-BSD FRML MDRD: >90 ML/MIN/1.73M2
GLUCOSE SERPL-MCNC: 81 MG/DL (ref 70–99)
HBA1C MFR BLD: 5 % (ref 0–5.6)
HCT VFR BLD AUTO: 42.5 % (ref 35–47)
HDLC SERPL-MCNC: 55 MG/DL
HGB BLD-MCNC: 14.2 G/DL (ref 11.7–15.7)
IMM GRANULOCYTES # BLD: 0 10E3/UL
IMM GRANULOCYTES NFR BLD: 0 %
LDLC SERPL CALC-MCNC: 114 MG/DL
LYMPHOCYTES # BLD AUTO: 1.6 10E3/UL (ref 0.8–5.3)
LYMPHOCYTES NFR BLD AUTO: 28 %
MCH RBC QN AUTO: 30.4 PG (ref 26.5–33)
MCHC RBC AUTO-ENTMCNC: 33.4 G/DL (ref 31.5–36.5)
MCV RBC AUTO: 91 FL (ref 78–100)
MONOCYTES # BLD AUTO: 0.5 10E3/UL (ref 0–1.3)
MONOCYTES NFR BLD AUTO: 8 %
NEUTROPHILS # BLD AUTO: 3.6 10E3/UL (ref 1.6–8.3)
NEUTROPHILS NFR BLD AUTO: 62 %
NONHDLC SERPL-MCNC: 125 MG/DL
PLATELET # BLD AUTO: 158 10E3/UL (ref 150–450)
POTASSIUM SERPL-SCNC: 4.4 MMOL/L (ref 3.4–5.3)
PROT SERPL-MCNC: 7.3 G/DL (ref 6.4–8.3)
RBC # BLD AUTO: 4.67 10E6/UL (ref 3.8–5.2)
SODIUM SERPL-SCNC: 139 MMOL/L (ref 136–145)
T4 FREE SERPL-MCNC: 1.15 NG/DL (ref 0.9–1.7)
TRIGL SERPL-MCNC: 53 MG/DL
TSH SERPL DL<=0.005 MIU/L-ACNC: 2.99 UIU/ML (ref 0.3–4.2)
WBC # BLD AUTO: 5.8 10E3/UL (ref 4–11)

## 2023-05-02 PROCEDURE — 80061 LIPID PANEL: CPT

## 2023-05-02 PROCEDURE — 82306 VITAMIN D 25 HYDROXY: CPT

## 2023-05-02 PROCEDURE — 80050 GENERAL HEALTH PANEL: CPT

## 2023-05-02 PROCEDURE — 84439 ASSAY OF FREE THYROXINE: CPT

## 2023-05-02 PROCEDURE — 36415 COLL VENOUS BLD VENIPUNCTURE: CPT

## 2023-05-02 PROCEDURE — 83036 HEMOGLOBIN GLYCOSYLATED A1C: CPT

## 2023-12-22 ENCOUNTER — TELEPHONE (OUTPATIENT)
Dept: FAMILY MEDICINE | Facility: CLINIC | Age: 28
End: 2023-12-22
Payer: COMMERCIAL

## 2023-12-22 NOTE — TELEPHONE ENCOUNTER
Patient Quality Outreach    Patient is due for the following:   Cervical Cancer Screening - PAP Needed  Physical Preventive Adult Physical    Next Steps:   Patient has upcoming appointment, these items will be addressed at that time.  Patient scheduled for 2/14/2024.    Type of outreach:    Chart review performed, no outreach needed.      Questions for provider review:    None           Ashli Lim

## 2024-02-08 SDOH — HEALTH STABILITY: PHYSICAL HEALTH: ON AVERAGE, HOW MANY MINUTES DO YOU ENGAGE IN EXERCISE AT THIS LEVEL?: 0 MIN

## 2024-02-08 SDOH — HEALTH STABILITY: PHYSICAL HEALTH: ON AVERAGE, HOW MANY DAYS PER WEEK DO YOU ENGAGE IN MODERATE TO STRENUOUS EXERCISE (LIKE A BRISK WALK)?: 0 DAYS

## 2024-02-08 ASSESSMENT — SOCIAL DETERMINANTS OF HEALTH (SDOH): HOW OFTEN DO YOU GET TOGETHER WITH FRIENDS OR RELATIVES?: TWICE A WEEK

## 2024-02-14 ENCOUNTER — OFFICE VISIT (OUTPATIENT)
Dept: FAMILY MEDICINE | Facility: CLINIC | Age: 29
End: 2024-02-14
Payer: COMMERCIAL

## 2024-02-14 VITALS
TEMPERATURE: 97.2 F | HEART RATE: 85 BPM | BODY MASS INDEX: 27.71 KG/M2 | OXYGEN SATURATION: 99 % | WEIGHT: 150.6 LBS | DIASTOLIC BLOOD PRESSURE: 76 MMHG | SYSTOLIC BLOOD PRESSURE: 114 MMHG | RESPIRATION RATE: 16 BRPM | HEIGHT: 62 IN

## 2024-02-14 DIAGNOSIS — Z79.899 HIGH RISK MEDICATION USE: ICD-10-CM

## 2024-02-14 DIAGNOSIS — Z12.4 CERVICAL CANCER SCREENING: ICD-10-CM

## 2024-02-14 DIAGNOSIS — Z00.00 ROUTINE GENERAL MEDICAL EXAMINATION AT A HEALTH CARE FACILITY: Primary | ICD-10-CM

## 2024-02-14 DIAGNOSIS — N93.8 DUB (DYSFUNCTIONAL UTERINE BLEEDING): ICD-10-CM

## 2024-02-14 DIAGNOSIS — Z79.899 HIGH RISK MEDICATION USE: Primary | ICD-10-CM

## 2024-02-14 DIAGNOSIS — E55.9 VITAMIN D DEFICIENCY: ICD-10-CM

## 2024-02-14 DIAGNOSIS — F20.89 OTHER SCHIZOPHRENIA (H): ICD-10-CM

## 2024-02-14 LAB
ALBUMIN SERPL BCG-MCNC: 4.6 G/DL (ref 3.5–5.2)
ALP SERPL-CCNC: 62 U/L (ref 40–150)
ALT SERPL W P-5'-P-CCNC: 13 U/L (ref 0–50)
ANION GAP SERPL CALCULATED.3IONS-SCNC: 13 MMOL/L (ref 7–15)
AST SERPL W P-5'-P-CCNC: 22 U/L (ref 0–45)
BILIRUB SERPL-MCNC: 0.6 MG/DL
BUN SERPL-MCNC: 10 MG/DL (ref 6–20)
CALCIUM SERPL-MCNC: 8.9 MG/DL (ref 8.6–10)
CHLORIDE SERPL-SCNC: 103 MMOL/L (ref 98–107)
CREAT SERPL-MCNC: 0.88 MG/DL (ref 0.51–0.95)
DEPRECATED HCO3 PLAS-SCNC: 23 MMOL/L (ref 22–29)
EGFRCR SERPLBLD CKD-EPI 2021: >90 ML/MIN/1.73M2
GLUCOSE SERPL-MCNC: 76 MG/DL (ref 70–99)
POTASSIUM SERPL-SCNC: 4 MMOL/L (ref 3.4–5.3)
PROT SERPL-MCNC: 7.5 G/DL (ref 6.4–8.3)
SODIUM SERPL-SCNC: 139 MMOL/L (ref 135–145)
VIT D+METAB SERPL-MCNC: 31 NG/ML (ref 20–50)

## 2024-02-14 PROCEDURE — 80053 COMPREHEN METABOLIC PANEL: CPT | Performed by: FAMILY MEDICINE

## 2024-02-14 PROCEDURE — 36415 COLL VENOUS BLD VENIPUNCTURE: CPT | Performed by: FAMILY MEDICINE

## 2024-02-14 PROCEDURE — 99395 PREV VISIT EST AGE 18-39: CPT | Performed by: FAMILY MEDICINE

## 2024-02-14 PROCEDURE — 82306 VITAMIN D 25 HYDROXY: CPT | Performed by: FAMILY MEDICINE

## 2024-02-14 RX ORDER — NORGESTREL-ETHINYL ESTRADIOL 0.3-0.03MG
TABLET ORAL
Qty: 112 TABLET | Refills: 4 | Status: SHIPPED | OUTPATIENT
Start: 2024-02-14

## 2024-02-14 RX ORDER — CHOLECALCIFEROL (VITAMIN D3) 50 MCG
1 TABLET ORAL DAILY
COMMUNITY

## 2024-02-14 RX ORDER — SODIUM FLUORIDE 5 MG/G
GEL, DENTIFRICE DENTAL AT BEDTIME
COMMUNITY

## 2024-02-14 ASSESSMENT — PATIENT HEALTH QUESTIONNAIRE - PHQ9
SUM OF ALL RESPONSES TO PHQ QUESTIONS 1-9: 2
SUM OF ALL RESPONSES TO PHQ QUESTIONS 1-9: 2
10. IF YOU CHECKED OFF ANY PROBLEMS, HOW DIFFICULT HAVE THESE PROBLEMS MADE IT FOR YOU TO DO YOUR WORK, TAKE CARE OF THINGS AT HOME, OR GET ALONG WITH OTHER PEOPLE: NOT DIFFICULT AT ALL

## 2024-02-14 ASSESSMENT — PAIN SCALES - GENERAL: PAINLEVEL: NO PAIN (0)

## 2024-02-14 NOTE — PROGRESS NOTES
"Preventive Care Visit  [facility]  Ha Velasco MD, Family Medicine  Feb 14, 2024    Assessment & Plan     Routine general medical examination at a health care facility    Cervical cancer screening  declines    Other schizophrenia (H): stablec following with psych  Known issue that I take into account for their medical decisions, no current exacerbations or new concerns      DUB (dysfunctional uterine bleeding)  Stable refill  - norgestrel-ethinyl estradiol (CRYSELLE-28) 0.3-30 MG-MCG tablet; TAKE ONE ACTIVE TABLET BY MOUTH DAILY CONTINUOUSLY.    Vitamin D deficiency: plan recheck  - Vitamin D Deficiency; Future          BMI  Estimated body mass index is 27.4 kg/m  as calculated from the following:    Height as of this encounter: 1.579 m (5' 2.17\").    Weight as of this encounter: 68.3 kg (150 lb 9.6 oz).   Weight management plan: Discussed healthy diet and exercise guidelines    Counseling  Appropriate preventive services were discussed with this patient, including applicable screening as appropriate for fall prevention, nutrition, physical activity, Tobacco-use cessation, weight loss and cognition.  Checklist reviewing preventive services available has been given to the patient.  Reviewed patient's diet, addressing concerns and/or questions.     Patient has been advised of split billing requirements and indicates understanding: Yes    See Patient Instructions    Anup Sampson is a 28 year old, presenting for the following:  Physical (Fasting. Does have outside order from Psychiatrist.)        2/14/2024     9:44 AM   Additional Questions   Roomed by Ashli Lim   Accompanied by Mother (Sharron)         2/14/2024     9:44 AM   Patient Reported Additional Medications   Patient reports taking the following new medications Dentagel toothpaste from dentist- once daily, Vitamin D3 50mcg daily        Health Care Directive  Patient has a Health Care Directive on file  Advance " care planning document is on file and is current.    HPI        2/8/2024   General Health   How would you rate your overall physical health? (!) FAIR   Feel stress (tense, anxious, or unable to sleep) Not at all         2/8/2024   Nutrition   Three or more servings of calcium each day? (!) NO   Diet: Regular (no restrictions)   How many servings of fruit and vegetables per day? (!) 0-1   How many sweetened beverages each day? (!) 2         2/8/2024   Exercise   Days per week of moderate/strenous exercise 0 days   Average minutes spent exercising at this level 0 min   (!) EXERCISE CONCERN      2/8/2024   Social Factors   Frequency of gathering with friends or relatives Twice a week   Worry food won't last until get money to buy more No   Food not last or not have enough money for food? No   Do you have housing?  Yes   Are you worried about losing your housing? No   Lack of transportation? No   Unable to get utilities (heat,electricity)? No         2/8/2024   Dental   Dentist two times every year? Yes         2/8/2024   TB Screening   Were you born outside of [place]?  No       Today's PHQ-9 Score:       2/14/2024     9:36 AM   PHQ-9 SCORE   PHQ-9 Total Score MyChart 2 (Minimal depression)   PHQ-9 Total Score 2         2/8/2024   Substance Use   Alcohol more than 3/day or more than 7/wk Not Applicable   Do you use any other substances recreationally? No     Social History     Tobacco Use    Smoking status: Never    Smokeless tobacco: Never   Vaping Use    Vaping Use: Never used   Substance Use Topics    Alcohol use: No    Drug use: No             2/8/2024   Breast Cancer Screening   Family history of breast, colon, or ovarian cancer? No / Unknown      Mammogram Screening - Patient under 40 years of age: Routine Mammogram Screening not recommended.         2/8/2024   STI Screening   New sexual partner(s) since last STI/HIV test? No     History of abnormal Pap smear: NO - age 21-29 PAP every 3 years recommended: patient  "declines             2/8/2024   Contraception/Family Planning   Questions about contraception or family planning No        Reviewed and updated as needed this visit by Provider                    BP Readings from Last 3 Encounters:   02/14/24 114/76   02/07/23 132/80   01/10/23 120/78    Wt Readings from Last 3 Encounters:   02/14/24 68.3 kg (150 lb 9.6 oz)   02/07/23 72.5 kg (159 lb 12.8 oz)   01/10/23 73.5 kg (162 lb)                      Review of Systems  Constitutional, HEENT, cardiovascular, pulmonary, gi and gu systems are negative, except as otherwise noted.     Objective    Exam  /76 (BP Location: Right arm, Patient Position: Sitting, Cuff Size: Adult Regular)   Pulse 85   Temp 97.2  F (36.2  C) (Tympanic)   Resp 16   Ht 1.579 m (5' 2.17\")   Wt 68.3 kg (150 lb 9.6 oz)   SpO2 99%   BMI 27.40 kg/m     Estimated body mass index is 27.4 kg/m  as calculated from the following:    Height as of this encounter: 1.579 m (5' 2.17\").    Weight as of this encounter: 68.3 kg (150 lb 9.6 oz).    Physical Exam  GENERAL: alert and no distress  EYES: Eyes grossly normal to inspection, PERRL and conjunctivae and sclerae normal  HENT: ear canals and TM's normal, nose and mouth without ulcers or lesions  NECK: no adenopathy, no asymmetry, masses, or scars  RESP: lungs clear to auscultation - no rales, rhonchi or wheezes  CV: regular rate and rhythm, Loud murmur,  no peripheral edema  ABDOMEN: soft, nontender, no hepatosplenomegaly, no masses and bowel sounds normal  MS: no gross musculoskeletal defects noted, no edema  SKIN: no suspicious lesions or rashes  NEURO: Normal strength and tone, mentation intact and speech normal  PSYCH: mentation appears normal, affect normal/bright      Signed Electronically by: Ha Velasco MD    Wt Readings from Last 4 Encounters:   02/14/24 68.3 kg (150 lb 9.6 oz)   02/07/23 72.5 kg (159 lb 12.8 oz)   01/10/23 73.5 kg (162 lb)   11/22/22 75.8 kg (167 lb 1.7 oz)       "

## 2024-02-14 NOTE — PATIENT INSTRUCTIONS
To lab  After lab ok to go    Goals: find something that you like for exercise videos or exercise.   Calcium: goal to get at least 2 servings a day  Goal: increase veggies and fruit to get 2 a day  Goal: decrease sweetened beverages and increase water    This is a preventative visit and any additional concerns or chronic disease management including medication refills addressed today could be charged additionally.    Preventative visits screen for diseases prior to they occur.  They do not cover for any new diagnosis or chronic disease management.     If you have questions regarding your coverage please check with your insurance provider.  At [facility] we need to code correctly to be in compliance with all insurance companies.  Your Health Risk Assessment indicates you feel you are not in good health    A healthy lifestyle helps keep the body fit and the mind alert. It helps protect you from disease, helps you fight disease, and helps prevent chronic disease (disease that doesn't go away) from getting worse. This is important as you get older and begin to notice twinges in muscles and joints and a decline in the strength and stamina you once took for granted. A healthy lifestyle includes good healthcare, good nutrition, weight control, recreation, and regular exercise. Avoid harmful substances and do what you can to keep safe. Another part of a healthy lifestyle is stay mentally active and socially involved.    Good healthcare   Have a wellness visit every year.   If you have new symptoms, let us know right away. Don't wait until the next checkup.   Take medicines exactly as prescribed and keep your medicines in a safe place. Tell us if your medicine causes problems.   Healthy diet and weight control   Eat 3 or 4 small, nutritious, low-fat, high-fiber meals a day. Include a variety of fruits, vegetables, and whole-grain foods.   Make sure you get enough calcium in your diet. Calcium, vitamin D, and exercise help  "prevent osteoporosis (bone thinning).   If you live alone, try eating with others when you can. That way you get a good meal and have company while you eat it.   Try to keep a healthy weight. If you eat more calories than your body uses for energy, it will be stored as fat and you will gain weight.     Recreation   Recreation is not limited to sports and team events. It includes any activity that provides relaxation, interest, enjoyment, and exercise. Recreation provides an outlet for physical, mental, and social energy. It can give a sense of worth and achievement. It can help you stay healthy.    Mental Exercise and Social Involvement  Mental and emotional health is as important as physical health. Keep in touch with friends and family. Stay as active as possible. Continue to learn and challenge yourself.   Things you can do to stay mentally active are:  Learn something new, like a foreign language or musical instrument.   Play SCRABBLE or do crossword puzzles. If you cannot find people to play these games with you at home, you can play them with others on your computer through the Internet.   Join a games club--anything from card games to chess or checkers or lawn bowling.   Start a new hobby.   Go back to school.   Volunteer.   Read.   Keep up with world events.  Learning About Being Physically Active  What is physical activity?     Being physically active means doing any kind of activity that gets your body moving.  The types of physical activity that can help you get fit and stay healthy include:  Aerobic or \"cardio\" activities. These make your heart beat faster and make you breathe harder, such as brisk walking, riding a bike, or running. They strengthen your heart and lungs and build up your endurance.  Strength training activities. These make your muscles work against, or \"resist,\" something. Examples include lifting weights or doing push-ups. These activities help tone and strengthen your muscles and " bones.  Stretches. These let you move your joints and muscles through their full range of motion. Stretching helps you be more flexible.  Reaching a balance between these three types of physical activity is important because each one contributes to your overall fitness.  What are the benefits of being active?  Being active is one of the best things you can do for your health. It helps you to:  Feel stronger and have more energy to do all the things you like to do.  Focus better at school or work.  Feel, think, and sleep better.  Reach and stay at a healthy weight.  Lose fat and build lean muscle.  Lower your risk for serious health problems, including diabetes, heart attack, high blood pressure, and some cancers.  Keep your heart, lungs, bones, muscles, and joints strong and healthy.  How can you make being active part of your life?  Start slowly. Make it your long-term goal to get at least 30 minutes of exercise on most days of the week. Walking is a good choice. You also may want to do other activities, such as running, swimming, cycling, or playing tennis or team sports.  Pick activities that you like--ones that make your heart beat faster, your muscles stronger, and your muscles and joints more flexible. If you find more than one thing you like doing, do them all. You don't have to do the same thing every day.  Get your heart pumping every day. Any activity that makes your heart beat faster and keeps it at that rate for a while counts.  Here are some great ways to get your heart beating faster:  Go for a brisk walk, run, or hike.  Go for a swim or bike ride.  Take an online exercise class or dance.  Play a game of touch football, basketball, or soccer.  Play tennis, pickleball, or racquetball.  Climb stairs.  Even some household chores can be aerobic. Just do them at a faster pace. Raking or mowing the lawn, sweeping the garage, and vacuuming and cleaning your home all can help get your heart rate  "up.  Strengthen your muscles during the week. You don't have to lift heavy weights or grow big, bulky muscles to get stronger. Doing a few simple activities that make your muscles work against, or \"resist,\" something can help you get stronger. Aim for at least twice a week.  For example, you can:  Do push-ups or sit-ups, which use your own body weight as resistance.  Lift weights or dumbbells or use stretch bands at home or in a gym or community center.  Stretch your muscles often. Stretching will help you as you become more active. It can help you stay flexible and loosen tight muscles. It can also help improve your balance and posture and can be a great way to relax.  Be sure to stretch the muscles you'll be using when you work out. It's best to warm your muscles slightly before you stretch them. Walk or do some other light aerobic activity for a few minutes. Then start stretching.  When you stretch your muscles:  Do it slowly. Stretching is not about going fast or making sudden movements.  Don't push or bounce during a stretch.  Hold each stretch for at least 15 to 30 seconds, if you can. You should feel a stretch in the muscle, but not pain.  Breathe out as you do the stretch. Then breathe in as you hold the stretch. Don't hold your breath.  If you're worried about how more activity might affect your health, have a checkup before you start. Follow any special advice your doctor gives you for getting a smart start.  Where can you learn more?  Go to https://www.[employer].net/patiented  Enter W332 in the search box to learn more about \"Learning About Being Physically Active.\"  Current as of: June 6, 2023               Content Version: 13.8    2814-1253 [employer].   Care instructions adapted under license by your healthcare professional. If you have questions about a medical condition or this instruction, always ask your healthcare professional. [employer] disclaims any warranty or " "liability for your use of this information.      Eating Healthy Foods: Care Instructions  With every meal, you can make healthy food choices. Try to eat a variety of fruits, vegetables, whole grains, lean proteins, and low-fat dairy products. This can help you get the right balance of nutrients, including vitamins and minerals. Small changes add up over time. You can start by adding one healthy food to your meals each day.    Try to make half your plate fruits and vegetables, one-fourth whole grains, and one-fourth lean proteins. Try including dairy with your meals.   Eat more fruits and vegetables. Try to have them with most meals and snacks.   Foods for healthy eating    Fruits    These can be fresh, frozen, canned, or dried.  Try to choose whole fruit rather than fruit juice.  Eat a variety of colors.    Vegetables    These can be fresh, frozen, canned, or dried.  Beans, peas, and lentils count too.    Whole grains    Choose whole-grain breads, cereals, and noodles.  Try brown rice.    Lean proteins    These can include lean meat, poultry, fish, and eggs.  You can also have tofu, beans, peas, lentils, nuts, and seeds.    Dairy    Try milk, yogurt, and cheese.  Choose low-fat or fat-free when you can.  If you need to, use lactose-free milk or fortified plant-based milk products, such as soy milk.    Water    Drink water when you're thirsty.  Limit sugar-sweetened drinks, including soda, fruit drinks, and sports drinks.  Where can you learn more?  Go to https://www.[employer].net/patiented  Enter T756 in the search box to learn more about \"Eating Healthy Foods: Care Instructions.\"  Current as of: February 28, 2023               Content Version: 13.8    2579-4064 [employer].   Care instructions adapted under license by your healthcare professional. If you have questions about a medical condition or this instruction, always ask your healthcare professional. [employer] disclaims any " warranty or liability for your use of this information.      Preventive Care Advice   This is general advice given by our system to help you stay healthy. However, your care team may have specific advice just for you. Please talk to your care team about your preventive care needs.  Nutrition  Eat 5 or more servings of fruits and vegetables each day.  Try wheat bread, brown rice and whole grain pasta (instead of white bread, rice, and pasta).  Get enough calcium and vitamin D. Check the label on foods and aim for 100% of the RDA (recommended daily allowance).  Lifestyle  Exercise at least 150 minutes each week  (30 minutes a day, 5 days a week).  Do muscle strengthening activities 2 days a week. These help control your weight and prevent disease.  No smoking.  Wear sunscreen to prevent skin cancer.  Have a dental exam and cleaning every 6 months.  Yearly exams  See your health care team every year to talk about:  Any changes in your health.  Any medicines your care team has prescribed.  Preventive care, family planning, and ways to prevent chronic diseases.  Shots (vaccines)   HPV shots (up to age 26), if you've never had them before.  Hepatitis B shots (up to age 59), if you've never had them before.  COVID-19 shot: Get this shot when it's due.  Flu shot: Get a flu shot every year.  Tetanus shot: Get a tetanus shot every 10 years.  Pneumococcal, hepatitis A, and RSV shots: Ask your care team if you need these based on your risk.  Shingles shot (for age 50 and up)  General health tests  Diabetes screening:  Starting at age 35, Get screened for diabetes at least every 3 years.  If you are younger than age 35, ask your care team if you should be screened for diabetes.  Cholesterol test: At age 39, start having a cholesterol test every 5 years, or more often if advised.  Bone density scan (DEXA): At age 50, ask your care team if you should have this scan for osteoporosis (brittle bones).  Hepatitis C: Get tested at  least once in your life.  STIs (sexually transmitted infections)  Before age 24: Ask your care team if you should be screened for STIs.  After age 24: Get screened for STIs if you're at risk. You are at risk for STIs (including HIV) if:  You are sexually active with more than one person.  You don't use condoms every time.  You or a partner was diagnosed with a sexually transmitted infection.  If you are at risk for HIV, ask about PrEP medicine to prevent HIV.  Get tested for HIV at least once in your life, whether you are at risk for HIV or not.  Cancer screening tests  Cervical cancer screening: If you have a cervix, begin getting regular cervical cancer screening tests starting at age 21.  Breast cancer scan (mammogram): If you've ever had breasts, begin having regular mammograms starting at age 40. This is a scan to check for breast cancer.  Colon cancer screening: It is important to start screening for colon cancer at age 45.  Have a colonoscopy test every 10 years (or more often if you're at risk) Or, ask your provider about stool tests like a FIT test every year or Cologuard test every 3 years.  To learn more about your testing options, visit:   https://www.[employer]ds/176322.pdf.  For help making a decision, visit:   https://bit.ly/ed37469.  Prostate cancer screening test: If you have a prostate, ask your care team if a prostate cancer screening test (PSA) at age 55 is right for you.  Lung cancer screening: If you are a current or former smoker ages 50 to 80, ask your care team if ongoing lung cancer screenings are right for you.  For informational purposes only. Not to replace the advice of your health care provider. Copyright   2023 [employer]. All rights reserved. Clinically reviewed by the [facility] Transitions Program. [employer] 710393 - REV 01/24.

## 2025-02-15 SDOH — HEALTH STABILITY: PHYSICAL HEALTH: ON AVERAGE, HOW MANY MINUTES DO YOU ENGAGE IN EXERCISE AT THIS LEVEL?: 10 MIN

## 2025-02-15 SDOH — HEALTH STABILITY: PHYSICAL HEALTH: ON AVERAGE, HOW MANY DAYS PER WEEK DO YOU ENGAGE IN MODERATE TO STRENUOUS EXERCISE (LIKE A BRISK WALK)?: 5 DAYS

## 2025-02-15 ASSESSMENT — SOCIAL DETERMINANTS OF HEALTH (SDOH): HOW OFTEN DO YOU GET TOGETHER WITH FRIENDS OR RELATIVES?: MORE THAN THREE TIMES A WEEK

## 2025-02-19 ENCOUNTER — OFFICE VISIT (OUTPATIENT)
Dept: FAMILY MEDICINE | Facility: CLINIC | Age: 30
End: 2025-02-19
Attending: FAMILY MEDICINE
Payer: COMMERCIAL

## 2025-02-19 VITALS
WEIGHT: 150.2 LBS | DIASTOLIC BLOOD PRESSURE: 74 MMHG | SYSTOLIC BLOOD PRESSURE: 114 MMHG | HEART RATE: 80 BPM | OXYGEN SATURATION: 98 % | RESPIRATION RATE: 16 BRPM | BODY MASS INDEX: 27.64 KG/M2 | TEMPERATURE: 96.8 F | HEIGHT: 62 IN

## 2025-02-19 DIAGNOSIS — Z00.00 ROUTINE GENERAL MEDICAL EXAMINATION AT A HEALTH CARE FACILITY: Primary | ICD-10-CM

## 2025-02-19 DIAGNOSIS — Z12.4 CERVICAL CANCER SCREENING: ICD-10-CM

## 2025-02-19 DIAGNOSIS — N93.8 DUB (DYSFUNCTIONAL UTERINE BLEEDING): ICD-10-CM

## 2025-02-19 PROCEDURE — 99395 PREV VISIT EST AGE 18-39: CPT | Performed by: FAMILY MEDICINE

## 2025-02-19 RX ORDER — NORGESTREL-ETHINYL ESTRADIOL 0.3-0.03MG
TABLET ORAL
Qty: 112 TABLET | Refills: 4 | Status: SHIPPED | OUTPATIENT
Start: 2025-02-19

## 2025-02-19 ASSESSMENT — PAIN SCALES - GENERAL: PAINLEVEL_OUTOF10: NO PAIN (0)

## 2025-02-19 NOTE — PROGRESS NOTES
"Preventive Care Visit  [facility]  Ha Velasco MD, Family Medicine  Feb 19, 2025      Assessment & Plan     Routine general medical examination at a health care facility    DUB (dysfunctional uterine bleeding)  Stable, refill  - norgestrel-ethinyl estradiol (CRYSELLE-28) 0.3-30 MG-MCG tablet; TAKE ONE ACTIVE TABLET BY MOUTH DAILY CONTINUOUSLY.    Cervical cancer screening  declines    Patient has been advised of split billing requirements and indicates understanding: Yes        BMI  Estimated body mass index is 27.15 kg/m  as calculated from the following:    Height as of this encounter: 1.584 m (5' 2.36\").    Weight as of this encounter: 68.1 kg (150 lb 3.2 oz).   Weight management plan: Discussed healthy diet and exercise guidelines    Counseling  Appropriate preventive services were addressed with this patient via screening, questionnaire, or discussion as appropriate for fall prevention, nutrition, physical activity, Tobacco-use cessation, social engagement, weight loss and cognition.  Checklist reviewing preventive services available has been given to the patient.  Reviewed patient's diet, addressing concerns and/or questions.       See Patient Instructions    Anup Sampson is a 29 year old, presenting for the following:  Physical (fasting)        2/19/2025     9:46 AM   Additional Questions   Roomed by Ahsli Lim   Accompanied by self         2/19/2025     9:46 AM   Patient Reported Additional Medications   Patient reports taking the following new medications none          HPI    Medication Followup of Cryselle for DUB/ birth control  Taking Medication as prescribed: yes  Side Effects:  None  Medication Helping Symptoms:  yes  Periods are monthly, no in between periods bleeding.  No concerns.  Not sexually active      Health Care Directive  Patient has a Health Care Directive on file      2/15/2025   General Health   How would you rate your overall physical " health? Good   Feel stress (tense, anxious, or unable to sleep) Only a little   (!) STRESS CONCERN      2/15/2025   Nutrition   Three or more servings of calcium each day? Yes   Diet: Regular (no restrictions)   How many servings of fruit and vegetables per day? (!) 0-1   How many sweetened beverages each day? (!) 2         2/15/2025   Exercise   Days per week of moderate/strenous exercise 5 days   Average minutes spent exercising at this level 10 min         2/15/2025   Social Factors   Frequency of gathering with friends or relatives More than three times a week   Worry food won't last until get money to buy more No   Food not last or not have enough money for food? No   Do you have housing? (Housing is defined as stable permanent housing and does not include staying ouside in a car, in a tent, in an abandoned building, in an overnight shelter, or couch-surfing.) Yes   Are you worried about losing your housing? No   Lack of transportation? No   Unable to get utilities (heat,electricity)? No         2/15/2025   Dental   Dentist two times every year? Yes         2/8/2024   TB Screening   Were you born outside of the [place]? No       Today's PHQ-9 Score:       2/19/2025     9:35 AM   PHQ-9 SCORE   PHQ-9 Total Score MyChart 2 (Minimal depression)   PHQ-9 Total Score 2        Patient-reported         2/15/2025   Substance Use   Alcohol more than 3/day or more than 7/wk Not Applicable   Do you use any other substances recreationally? No     Social History     Tobacco Use    Smoking status: Never    Smokeless tobacco: Never   Vaping Use    Vaping status: Never Used   Substance Use Topics    Alcohol use: No    Drug use: No          Mammogram Screening - Patient under 40 years of age: Routine Mammogram Screening not recommended.         2/15/2025   STI Screening   New sexual partner(s) since last STI/HIV test? No     History of abnormal Pap smear: patient declines             2/15/2025   Contraception/Family Planning  "  Questions about contraception or family planning No        Reviewed and updated as needed this visit by Provider                    BP Readings from Last 3 Encounters:   02/19/25 114/74   02/14/24 114/76   02/07/23 132/80    Wt Readings from Last 3 Encounters:   02/19/25 68.1 kg (150 lb 3.2 oz)   02/14/24 68.3 kg (150 lb 9.6 oz)   02/07/23 72.5 kg (159 lb 12.8 oz)              Review of Systems  Constitutional, HEENT, cardiovascular, pulmonary, gi and gu systems are negative, except as otherwise noted.     Objective    Exam  /74 (BP Location: Right arm, Patient Position: Sitting, Cuff Size: Adult Regular)   Pulse 80   Temp 96.8  F (36  C) (Tympanic)   Resp 16   Ht 1.584 m (5' 2.36\")   Wt 68.1 kg (150 lb 3.2 oz)   LMP  (LMP Unknown)   SpO2 98%   BMI 27.15 kg/m     Estimated body mass index is 27.15 kg/m  as calculated from the following:    Height as of this encounter: 1.584 m (5' 2.36\").    Weight as of this encounter: 68.1 kg (150 lb 3.2 oz).    Physical Exam  GENERAL: alert and no distress  EYES: Eyes grossly normal to inspection, PERRL and conjunctivae and sclerae normal  HENT: ear canals and TM's normal, nose and mouth without ulcers or lesions  NECK: no adenopathy, no asymmetry, masses, or scars  RESP: lungs clear to auscultation - no rales, rhonchi or wheezes  CV: regular rate and rhythm, systolic murmur no peripheral edema  ABDOMEN: soft, nontender, no hepatosplenomegaly, no masses and bowel sounds normal  MS: no gross musculoskeletal defects noted, no edema  SKIN: no suspicious lesions or rashes  NEURO: Normal strength and tone, mentation intact and speech normal  PSYCH: mentation appears normal, affect normal/bright        Signed Electronically by: Ha Velasco MD    "

## 2025-02-19 NOTE — PATIENT INSTRUCTIONS
Goal: decrease caffeine  Decrease drinkable calories.    No labs needed today. Plan fasting cholesterol at age 35.    This is a preventative visit and any additional concerns or chronic disease management including medication refills addressed today could be charged additionally.    Preventative visits screen for diseases prior to they occur.  They do not cover for any new diagnosis or chronic disease management.     If you have questions regarding your coverage please check with your insurance provider.  At [facility] we need to code correctly to be in compliance with all insurance companies.    Patient Education   Preventive Care Advice   This is general advice given by our system to help you stay healthy. However, your care team may have specific advice just for you. Please talk to your care team about your preventive care needs.  Nutrition  Eat 5 or more servings of fruits and vegetables each day.  Try wheat bread, brown rice and whole grain pasta (instead of white bread, rice, and pasta).  Get enough calcium and vitamin D. Check the label on foods and aim for 100% of the RDA (recommended daily allowance).  Lifestyle  Exercise at least 150 minutes each week  (30 minutes a day, 5 days a week).  Do muscle strengthening activities 2 days a week. These help control your weight and prevent disease.  No smoking.  Wear sunscreen to prevent skin cancer.  Have a dental exam and cleaning every 6 months.  Yearly exams  See your health care team every year to talk about:  Any changes in your health.  Any medicines your care team has prescribed.  Preventive care, family planning, and ways to prevent chronic diseases.  Shots (vaccines)   HPV shots (up to age 26), if you've never had them before.  Hepatitis B shots (up to age 59), if you've never had them before.  COVID-19 shot: Get this shot when it's due.  Flu shot: Get a flu shot every year.  Tetanus shot: Get a tetanus shot every 10 years.  Pneumococcal, hepatitis A, and RSV  shots: Ask your care team if you need these based on your risk.  Shingles shot (for age 50 and up)  General health tests  Diabetes screening:  Starting at age 35, Get screened for diabetes at least every 3 years.  If you are younger than age 35, ask your care team if you should be screened for diabetes.  Cholesterol test: At age 39, start having a cholesterol test every 5 years, or more often if advised.  Bone density scan (DEXA): At age 50, ask your care team if you should have this scan for osteoporosis (brittle bones).  Hepatitis C: Get tested at least once in your life.  STIs (sexually transmitted infections)  Before age 24: Ask your care team if you should be screened for STIs.  After age 24: Get screened for STIs if you're at risk. You are at risk for STIs (including HIV) if:  You are sexually active with more than one person.  You don't use condoms every time.  You or a partner was diagnosed with a sexually transmitted infection.  If you are at risk for HIV, ask about PrEP medicine to prevent HIV.  Get tested for HIV at least once in your life, whether you are at risk for HIV or not.  Cancer screening tests  Cervical cancer screening: If you have a cervix, begin getting regular cervical cancer screening tests starting at age 21.  Breast cancer scan (mammogram): If you've ever had breasts, begin having regular mammograms starting at age 40. This is a scan to check for breast cancer.  Colon cancer screening: It is important to start screening for colon cancer at age 45.  Have a colonoscopy test every 10 years (or more often if you're at risk) Or, ask your provider about stool tests like a FIT test every year or Cologuard test every 3 years.  To learn more about your testing options, visit:   .  For help making a decision, visit:   https://bit.ly/rh71333.  Prostate cancer screening test: If you have a prostate, ask your care team if a prostate cancer screening test (PSA) at age 55 is right for you.  Lung cancer  screening: If you are a current or former smoker ages 50 to 80, ask your care team if ongoing lung cancer screenings are right for you.  For informational purposes only. Not to replace the advice of your health care provider. Copyright   2023 [facility]. All rights reserved. Clinically reviewed by the [facility] Transitions Program. [email or web address] [id number] - REV 01/24.

## 2025-03-26 ENCOUNTER — TELEPHONE (OUTPATIENT)
Dept: FAMILY MEDICINE | Facility: CLINIC | Age: 30
End: 2025-03-26
Payer: COMMERCIAL

## 2025-03-26 NOTE — TELEPHONE ENCOUNTER
Patient Quality Outreach    Patient is due for the following:   Cervical Cancer Screening - PAP Needed    Action(s) Taken:   Patient requests NO further contact/outreach  Declines paps.    Type of outreach:    Patient requests NO further contact/outreach    Questions for provider review:    None         Ashli Lim  Chart routed to None.

## (undated) DEVICE — DRAPE POUCH INSTRUMENT 3 POCKET 1018L

## (undated) DEVICE — ADH SKIN CLOSURE PREMIERPRO EXOFIN 1.0ML 3470

## (undated) DEVICE — GOWN XLG DISP 9545

## (undated) DEVICE — SU VICRYL 0 UR-6 27" J603H

## (undated) DEVICE — ESU HOLSTER PLASTIC DISP E2400

## (undated) DEVICE — SU MONOCRYL 4-0 PS-2 18" UND Y496G

## (undated) DEVICE — ENDO TROCAR FIRST ENTRY KII FIOS ADV FIX 05X100MM CFF03

## (undated) DEVICE — STOCKING SLEEVE COMPRESSION CALF LG

## (undated) DEVICE — CLIP APPLIER ENDO ROTATING 10MM MED/LG ER320

## (undated) DEVICE — SOL WATER IRRIG 1000ML BOTTLE 07139-09

## (undated) DEVICE — GLOVE PROTEXIS W/NEU-THERA 7.5  2D73TE75

## (undated) DEVICE — DECANTER VIAL 2006S

## (undated) DEVICE — ESU PENCIL W/COATED BLADE E2450H

## (undated) DEVICE — SUCTION IRRIGATION STRYKFLOW II W/TIP DISP 250-070-520

## (undated) DEVICE — SYSTEM LAPAROVUE VISIBILITY LAPVUE10

## (undated) DEVICE — ESU ENDO SCISSORS 5MM CVD 5DCS

## (undated) DEVICE — ENDO TROCAR SLEEVE KII ADV FIXATION 05X100MM CFS02

## (undated) DEVICE — ESU ELEC CLEANCOAT LAP FLAT L-HOOK 36CM E3774-36C

## (undated) DEVICE — Device

## (undated) DEVICE — PREP CHLORAPREP 26ML TINTED ORANGE  260815

## (undated) DEVICE — SOL NACL 0.9% IRRIG 1000ML BOTTLE 07138-09

## (undated) DEVICE — ENDO TROCAR FIRST ENTRY KII FIOS ADV FIX 11X100MM CFF33

## (undated) DEVICE — ENDO POUCH UNIV RETRIEVAL SYSTEM INZII 10MM CD001

## (undated) DEVICE — GLOVE PROTEXIS BLUE W/NEU-THERA 8.0  2D73EB80

## (undated) DEVICE — SOL NACL 0.9% IRRIG 3000ML BAG 07972-08

## (undated) DEVICE — SUCTION MANIFOLD NEPTUNE 2 SYS 1 PORT 702-025-000

## (undated) RX ORDER — FENTANYL CITRATE 50 UG/ML
INJECTION, SOLUTION INTRAMUSCULAR; INTRAVENOUS
Status: DISPENSED
Start: 2022-11-07

## (undated) RX ORDER — LIDOCAINE HYDROCHLORIDE 10 MG/ML
INJECTION, SOLUTION EPIDURAL; INFILTRATION; INTRACAUDAL; PERINEURAL
Status: DISPENSED
Start: 2022-11-07

## (undated) RX ORDER — OXYCODONE HYDROCHLORIDE 5 MG/1
TABLET ORAL
Status: DISPENSED
Start: 2022-11-07

## (undated) RX ORDER — BUPIVACAINE HYDROCHLORIDE 5 MG/ML
INJECTION, SOLUTION EPIDURAL; INTRACAUDAL
Status: DISPENSED
Start: 2022-11-07

## (undated) RX ORDER — GABAPENTIN 300 MG/1
CAPSULE ORAL
Status: DISPENSED
Start: 2022-11-07

## (undated) RX ORDER — ONDANSETRON 2 MG/ML
INJECTION INTRAMUSCULAR; INTRAVENOUS
Status: DISPENSED
Start: 2022-11-07

## (undated) RX ORDER — LIDOCAINE HYDROCHLORIDE AND EPINEPHRINE 10; 10 MG/ML; UG/ML
INJECTION, SOLUTION INFILTRATION; PERINEURAL
Status: DISPENSED
Start: 2022-11-07

## (undated) RX ORDER — DEXAMETHASONE SODIUM PHOSPHATE 4 MG/ML
INJECTION, SOLUTION INTRA-ARTICULAR; INTRALESIONAL; INTRAMUSCULAR; INTRAVENOUS; SOFT TISSUE
Status: DISPENSED
Start: 2022-11-07

## (undated) RX ORDER — HEPARIN SODIUM 5000 [USP'U]/.5ML
INJECTION, SOLUTION INTRAVENOUS; SUBCUTANEOUS
Status: DISPENSED
Start: 2022-11-07

## (undated) RX ORDER — ACETAMINOPHEN 325 MG/1
TABLET ORAL
Status: DISPENSED
Start: 2022-11-07